# Patient Record
Sex: MALE | Race: WHITE | NOT HISPANIC OR LATINO | Employment: OTHER | URBAN - METROPOLITAN AREA
[De-identification: names, ages, dates, MRNs, and addresses within clinical notes are randomized per-mention and may not be internally consistent; named-entity substitution may affect disease eponyms.]

---

## 2017-01-16 ENCOUNTER — ANTICOAGULATION THERAPY VISIT (OUTPATIENT)
Dept: NURSING | Facility: CLINIC | Age: 77
End: 2017-01-16
Payer: COMMERCIAL

## 2017-01-16 DIAGNOSIS — I48.20 CHRONIC ATRIAL FIBRILLATION (H): ICD-10-CM

## 2017-01-16 DIAGNOSIS — Z79.01 LONG-TERM (CURRENT) USE OF ANTICOAGULANTS: Primary | ICD-10-CM

## 2017-01-16 LAB — INR POINT OF CARE: 2.3 (ref 0.86–1.14)

## 2017-01-16 PROCEDURE — 36416 COLLJ CAPILLARY BLOOD SPEC: CPT

## 2017-01-16 PROCEDURE — 99207 ZZC NO CHARGE NURSE ONLY: CPT

## 2017-01-16 PROCEDURE — 85610 PROTHROMBIN TIME: CPT | Mod: QW

## 2017-01-16 NOTE — PROGRESS NOTES
ANTICOAGULATION FOLLOW-UP CLINIC VISIT    Patient Name:  Malcolm Barker  Date:  1/16/2017  Contact Type:  Face to Face    SUBJECTIVE:     Patient Findings     Positives No Problem Findings           OBJECTIVE    INR PROTIME   Date Value Ref Range Status   01/16/2017 2.3* 0.86 - 1.14 Final       ASSESSMENT / PLAN  INR assessment THER    Recheck INR In: 4 WEEKS    INR Location Clinic      Anticoagulation Summary as of 1/16/2017     INR goal 2.0-3.0   Selected INR 2.3 (1/16/2017)   Maintenance plan 2.5 mg (5 mg x 0.5) on Wed, Sat; 5 mg (5 mg x 1) all other days   Full instructions 2.5 mg on Wed, Sat; 5 mg all other days   Weekly total 30 mg   No change documented Vianca Kent, RN   Plan last modified Mandi Phoenix RN (6/10/2016)   Next INR check 2/13/2017   Priority INR   Target end date     Indications   Long-term (current) use of anticoagulants [Z79.01] [Z79.01]  Chronic atrial fibrillation (H) [I48.2]         Anticoagulation Episode Summary     INR check location     Preferred lab     Send INR reminders to [facility]    Comments       Anticoagulation Care Providers     Provider Role Specialty Phone number    David Jalloh MD Responsible Internal Medicine [phone]            See the Encounter Report to view Anticoagulation Flowsheet and Dosing Calendar (Go to Encounters tab in chart review, and find the Anticoagulation Therapy Visit)        Vianca Kent RN

## 2017-02-13 ENCOUNTER — ANTICOAGULATION THERAPY VISIT (OUTPATIENT)
Dept: NURSING | Facility: CLINIC | Age: 77
End: 2017-02-13
Payer: COMMERCIAL

## 2017-02-13 DIAGNOSIS — I48.20 CHRONIC ATRIAL FIBRILLATION (H): ICD-10-CM

## 2017-02-13 DIAGNOSIS — Z79.01 LONG-TERM (CURRENT) USE OF ANTICOAGULANTS: ICD-10-CM

## 2017-02-13 LAB — INR POINT OF CARE: 2.3 (ref 0.86–1.14)

## 2017-02-13 PROCEDURE — 99207 ZZC NO CHARGE NURSE ONLY: CPT

## 2017-02-13 PROCEDURE — 36416 COLLJ CAPILLARY BLOOD SPEC: CPT

## 2017-02-13 PROCEDURE — 85610 PROTHROMBIN TIME: CPT | Mod: QW

## 2017-02-13 NOTE — PROGRESS NOTES
ANTICOAGULATION FOLLOW-UP CLINIC VISIT    Patient Name:  Malcolm Barker  Date:  2/13/2017  Contact Type:  Face to Face    SUBJECTIVE:     Patient Findings     Positives No Problem Findings           OBJECTIVE    INR Protime   Date Value Ref Range Status   02/13/2017 2.3 (A) 0.86 - 1.14 Final       ASSESSMENT / PLAN  INR assessment THER    Recheck INR In: 4 WEEKS    INR Location Clinic      Anticoagulation Summary as of 2/13/2017     INR goal 2.0-3.0   Today's INR 2.3   Maintenance plan 2.5 mg (5 mg x 0.5) on Wed, Sat; 5 mg (5 mg x 1) all other days   Full instructions 2.5 mg on Wed, Sat; 5 mg all other days   Weekly total 30 mg   No change documented Vianca Kent, RN   Plan last modified Mandi Phoenix RN (6/10/2016)   Next INR check 3/13/2017   Priority INR   Target end date Indefinite    Indications   Long-term (current) use of anticoagulants [Z79.01] [Z79.01]  Chronic atrial fibrillation (H) [I48.2]         Anticoagulation Episode Summary     INR check location     Preferred lab     Send INR reminders to [facility]    Comments 5mg tabs // APPT CARD      Anticoagulation Care Providers     Provider Role Specialty Phone number    David Jalloh MD Responsible Internal Medicine [phone]            See the Encounter Report to view Anticoagulation Flowsheet and Dosing Calendar (Go to Encounters tab in chart review, and find the Anticoagulation Therapy Visit)        Vianca Kent RN

## 2017-03-21 ENCOUNTER — ANTICOAGULATION THERAPY VISIT (OUTPATIENT)
Dept: NURSING | Facility: CLINIC | Age: 77
End: 2017-03-21
Payer: COMMERCIAL

## 2017-03-21 DIAGNOSIS — Z79.01 LONG-TERM (CURRENT) USE OF ANTICOAGULANTS: ICD-10-CM

## 2017-03-21 DIAGNOSIS — I48.20 CHRONIC ATRIAL FIBRILLATION (H): ICD-10-CM

## 2017-03-21 LAB — INR POINT OF CARE: 2 (ref 0.86–1.14)

## 2017-03-21 PROCEDURE — 36416 COLLJ CAPILLARY BLOOD SPEC: CPT

## 2017-03-21 PROCEDURE — 99207 ZZC NO CHARGE NURSE ONLY: CPT

## 2017-03-21 PROCEDURE — 85610 PROTHROMBIN TIME: CPT | Mod: QW

## 2017-03-21 NOTE — PROGRESS NOTES
ANTICOAGULATION FOLLOW-UP CLINIC VISIT    Patient Name:  Malcolm Barker  Date:  3/21/2017  Contact Type:  Face to Face    SUBJECTIVE:     Patient Findings     Positives Dental/Other procedures    Comments He is having a wisdom tooth pulled tomorrow. Needs a recent INR result to bring to dentist.  He is having it done at [facility], Dia Cisneros DDS. Phone: [phone]    Copy of INR result given to patient to bring to dentist office.           OBJECTIVE    INR Protime   Date Value Ref Range Status   03/21/2017 2.0 (A) 0.86 - 1.14 Final       ASSESSMENT / PLAN  INR assessment THER    Recheck INR In: 4 WEEKS    INR Location Clinic      Anticoagulation Summary as of 3/21/2017     INR goal 2.0-3.0   Today's INR 2.0   Maintenance plan 2.5 mg (5 mg x 0.5) on Wed, Sat; 5 mg (5 mg x 1) all other days   Full instructions 2.5 mg on Wed, Sat; 5 mg all other days   Weekly total 30 mg   No change documented Vianca Kent, RN   Plan last modified Mandi Phoenix RN (6/10/2016)   Next INR check 4/18/2017   Priority INR   Target end date Indefinite    Indications   Long-term (current) use of anticoagulants [Z79.01] [Z79.01]  Chronic atrial fibrillation (H) [I48.2]         Anticoagulation Episode Summary     INR check location     Preferred lab     Send INR reminders to [facility]    Comments 5mg tabs // APPT CARD      Anticoagulation Care Providers     Provider Role Specialty Phone number    David Jalloh MD [facility] Internal Medicine [phone]            See the Encounter Report to view Anticoagulation Flowsheet and Dosing Calendar (Go to Encounters tab in chart review, and find the Anticoagulation Therapy Visit)        Vianca Kent, APPLE

## 2017-03-21 NOTE — MR AVS SNAPSHOT
Malcolm HENDERSON Juan Manuelcarlostin   3/21/2017 11:30 AM   Anticoagulation Therapy Visit    Description:  77 year old male   Provider:  [facility]   Department:  [facility]           INR as of 3/21/2017     Today's INR 2.0      Anticoagulation Summary as of 3/21/2017     INR goal 2.0-3.0   Today's INR 2.0   Full instructions 2.5 mg on Wed, Sat; 5 mg all other days   Next INR check 4/18/2017    Indications   Long-term (current) use of anticoagulants [Z79.01] [Z79.01]  Chronic atrial fibrillation (H) [I48.2]         Your next Anticoagulation Clinic appointment(s)     Apr 18, 2017  8:15 AM CDT   Anticoagulation Visit with  ANTICOAGULATION CLINIC   [facility]    [address]  [address]  [place] [address] [phone]              Contact Numbers     [facility]  Please call  [phone] to cancel and/or reschedule your appointment   Please call  [phone] with any problems or questions regarding your therapy.        March 2017 Details    Sun Mon Tue Wed Thu Fri Sat        1               2               3               4                 5               6               7               8               9               10               11                 12               13               14               15               16               17               18                 19               20               21      5 mg   See details      22      2.5 mg         23      5 mg         24      5 mg         25      2.5 mg           26      5 mg         27      5 mg         28      5 mg         29      2.5 mg         30      5 mg         31      5 mg           Date Details   03/21 This INR check               How to take your warfarin dose     To take:  2.5 mg Take 0.5 of a 5 mg tablet.    To take:  5 mg Take 1 of the 5 mg tablets.           April 2017 Details    Sun Mon Tue Wed Thu Fri Sat           1      2.5 mg           2      5 mg         3      5 mg          4      5 mg         5      2.5 mg         6      5 mg         7      5 mg         8      2.5 mg           9      5 mg         10      5 mg         11      5 mg         12      2.5 mg         13      5 mg         14      5 mg         15      2.5 mg           16      5 mg         17      5 mg         18            19               20               21               22                 23               24               25               26               27               28               29                 30                      Date Details   No additional details    Date of next INR:  4/18/2017         How to take your warfarin dose     To take:  2.5 mg Take 0.5 of a 5 mg tablet.    To take:  5 mg Take 1 of the 5 mg tablets.

## 2017-04-05 ENCOUNTER — OFFICE VISIT (OUTPATIENT)
Dept: INTERNAL MEDICINE | Facility: CLINIC | Age: 77
End: 2017-04-05
Payer: COMMERCIAL

## 2017-04-05 ENCOUNTER — TELEPHONE (OUTPATIENT)
Dept: INTERNAL MEDICINE | Facility: CLINIC | Age: 77
End: 2017-04-05

## 2017-04-05 VITALS
TEMPERATURE: 98 F | DIASTOLIC BLOOD PRESSURE: 64 MMHG | HEIGHT: 71 IN | HEART RATE: 60 BPM | BODY MASS INDEX: 28 KG/M2 | SYSTOLIC BLOOD PRESSURE: 96 MMHG | OXYGEN SATURATION: 98 % | WEIGHT: 200 LBS

## 2017-04-05 DIAGNOSIS — G60.3 IDIOPATHIC PROGRESSIVE NEUROPATHY: ICD-10-CM

## 2017-04-05 DIAGNOSIS — Z12.5 SCREENING FOR PROSTATE CANCER: ICD-10-CM

## 2017-04-05 DIAGNOSIS — I50.22 CHRONIC SYSTOLIC CONGESTIVE HEART FAILURE (H): ICD-10-CM

## 2017-04-05 DIAGNOSIS — I10 ESSENTIAL HYPERTENSION, BENIGN: ICD-10-CM

## 2017-04-05 DIAGNOSIS — Z00.00 ROUTINE GENERAL MEDICAL EXAMINATION AT A HEALTH CARE FACILITY: Primary | ICD-10-CM

## 2017-04-05 DIAGNOSIS — I25.10 CORONARY ARTERY DISEASE INVOLVING NATIVE HEART WITHOUT ANGINA PECTORIS, UNSPECIFIED VESSEL OR LESION TYPE: ICD-10-CM

## 2017-04-05 DIAGNOSIS — I48.20 CHRONIC ATRIAL FIBRILLATION (H): ICD-10-CM

## 2017-04-05 LAB
ALBUMIN SERPL-MCNC: 4.1 G/DL (ref 3.4–5)
ALP SERPL-CCNC: 81 U/L (ref 40–150)
ALT SERPL W P-5'-P-CCNC: 25 U/L (ref 0–70)
ANION GAP SERPL CALCULATED.3IONS-SCNC: 4 MMOL/L (ref 3–14)
AST SERPL W P-5'-P-CCNC: 20 U/L (ref 0–45)
BILIRUB SERPL-MCNC: 0.5 MG/DL (ref 0.2–1.3)
BUN SERPL-MCNC: 30 MG/DL (ref 7–30)
CALCIUM SERPL-MCNC: 9.3 MG/DL (ref 8.5–10.1)
CHLORIDE SERPL-SCNC: 105 MMOL/L (ref 94–109)
CHOLEST SERPL-MCNC: 140 MG/DL
CO2 SERPL-SCNC: 32 MMOL/L (ref 20–32)
CREAT SERPL-MCNC: 1.29 MG/DL (ref 0.66–1.25)
ERYTHROCYTE [DISTWIDTH] IN BLOOD BY AUTOMATED COUNT: 13.4 % (ref 10–15)
GFR SERPL CREATININE-BSD FRML MDRD: 54 ML/MIN/1.7M2
GLUCOSE SERPL-MCNC: 101 MG/DL (ref 70–99)
HBA1C MFR BLD: 5.6 % (ref 4.3–6)
HCT VFR BLD AUTO: 45.1 % (ref 40–53)
HDLC SERPL-MCNC: 53 MG/DL
HGB BLD-MCNC: 15 G/DL (ref 13.3–17.7)
LDLC SERPL CALC-MCNC: 66 MG/DL
MCH RBC QN AUTO: 32.8 PG (ref 26.5–33)
MCHC RBC AUTO-ENTMCNC: 33.3 G/DL (ref 31.5–36.5)
MCV RBC AUTO: 99 FL (ref 78–100)
NONHDLC SERPL-MCNC: 87 MG/DL
PLATELET # BLD AUTO: 218 10E9/L (ref 150–450)
POTASSIUM SERPL-SCNC: 5 MMOL/L (ref 3.4–5.3)
PROT SERPL-MCNC: 7.4 G/DL (ref 6.8–8.8)
RBC # BLD AUTO: 4.57 10E12/L (ref 4.4–5.9)
SODIUM SERPL-SCNC: 141 MMOL/L (ref 133–144)
TRIGL SERPL-MCNC: 107 MG/DL
VIT B12 SERPL-MCNC: 605 PG/ML (ref 193–986)
WBC # BLD AUTO: 8.7 10E9/L (ref 4–11)

## 2017-04-05 PROCEDURE — 80061 LIPID PANEL: CPT | Performed by: INTERNAL MEDICINE

## 2017-04-05 PROCEDURE — 99397 PER PM REEVAL EST PAT 65+ YR: CPT | Performed by: INTERNAL MEDICINE

## 2017-04-05 PROCEDURE — G0103 PSA SCREENING: HCPCS | Performed by: INTERNAL MEDICINE

## 2017-04-05 PROCEDURE — 85027 COMPLETE CBC AUTOMATED: CPT | Performed by: INTERNAL MEDICINE

## 2017-04-05 PROCEDURE — 36415 COLL VENOUS BLD VENIPUNCTURE: CPT | Performed by: INTERNAL MEDICINE

## 2017-04-05 PROCEDURE — 83036 HEMOGLOBIN GLYCOSYLATED A1C: CPT | Performed by: INTERNAL MEDICINE

## 2017-04-05 PROCEDURE — 80053 COMPREHEN METABOLIC PANEL: CPT | Performed by: INTERNAL MEDICINE

## 2017-04-05 PROCEDURE — 82607 VITAMIN B-12: CPT | Performed by: INTERNAL MEDICINE

## 2017-04-05 RX ORDER — METOPROLOL SUCCINATE 25 MG/1
25 TABLET, EXTENDED RELEASE ORAL DAILY
COMMUNITY
End: 2023-01-01

## 2017-04-05 RX ORDER — TIMOLOL MALEATE 5 MG/ML
1 SOLUTION/ DROPS OPHTHALMIC DAILY
COMMUNITY
End: 2023-01-01

## 2017-04-05 RX ORDER — AMPICILLIN TRIHYDRATE 250 MG
CAPSULE ORAL DAILY
COMMUNITY
End: 2023-01-01

## 2017-04-05 NOTE — TELEPHONE ENCOUNTER
Per Yeimi-faemily order for [place] guard      Had pt and Dr Jalloh sign form. Filled out form and faxed. Kit will be mailed out to pt

## 2017-04-05 NOTE — PROGRESS NOTES
SUBJECTIVE:                                                            Malcolm Barker is a 77 year old male who presents for Preventive Visit.      Are you in the first 12 months of your Medicare Part B coverage?  No    Healthy Habits:    Do you get at least three servings of calcium containing foods daily (dairy, green leafy vegetables, etc.)? 2-3    Amount of exercise or daily activities, outside of work: 5 day(s) per week    Problems taking medications regularly No    Medication side effects: No    Have you had an eye exam in the past two years? yes    Do you see a dentist twice per year? yes    Do you have sleep apnea, excessive snoring or daytime drowsiness?no    COGNITIVE SCREEN  1) Repeat 3 items (Banana, Sunrise, Chair)    2) Clock draw: NORMAL  3) 3 item recall: Recalls 3 objects  Results: 3 items recalled: COGNITIVE IMPAIRMENT LESS LIKELY    Mini-CogTM Copyright S Pepper. Licensed by the author for use in [facility]; reprinted with permission ([email or web address]). All rights reserved.            PROBLEMS TO ADD ON...  No acute complaints, no medication change or new medical conditions.  Has h/o ischemic heart disease, asymptomatic regarding chest pains, SOB,palpitations. Has good compliance with treatment, diet and exercise.  Has CHF. No edema, has SOB on exertion. Follows with cardiology. Physically active.   Has h/o HTN. on medical treatment. BP has been controlled. No side effects from medications. No CP, HA, dizziness. good compliance with medications and low salt diet.  Has H/O hyperlipidemia. On medical treatment and diet. No side effects. No muscle weakness, myalgias or upset stomach.   Has history of atrial fibrillation. On anticoagulation with Coumadin and rate control medications. Asymptonatic - no chest pains , palpitations,  no side effects from medications.      Reviewed and updated as needed this visit by clinical staff         Reviewed and updated as needed this visit by  Provider        Social History   Substance Use Topics     Smoking status: Former Smoker     Years: 3.00     Smokeless tobacco: Never Used      Comment: quit approx 1999     Alcohol use Yes      Comment: 2 drinks daily       The patient does not drink >3 drinks per day nor >7 drinks per week.    Today's PHQ-2 Score: 0  PHQ-2 ( 1999 Pfizer) 4/5/2017 3/29/2016   Q1: Little interest or pleasure in doing things 0 0   Q2: Feeling down, depressed or hopeless 0 0   PHQ-2 Score 0 0       Do you feel safe in your environment - Yes    Do you have a Health Care Directive?: Yes: Advance Directive has been received and scanned.    Current providers sharing in care for this patient include:   Patient Care Team:  David Jalloh MD as PCP - General (Internal Medicine)      Hearing impairment: No    Ability to successfully perform activities of daily living: Yes, no assistance needed     Fall risk:       Home safety:  none identified      The following health maintenance items are reviewed in Epic and correct as of today:  Health Maintenance   Topic Date Due     ADVANCE DIRECTIVE PLANNING Q5 YRS (NO INBASKET)  03/14/2016     BMP Q6 MOS (NO INBASKET)  09/29/2016     HF ACTION PLAN Q3 YR (NO INBASKET MSG)  12/06/2016     ALT Q1 YR (NO INBASKET)  03/29/2017     LIPID MONITORING Q1 YEAR( NO INBASKET)  03/29/2017     CBC Q1 YR (NO INBASKET)  03/29/2017     FALL RISK ASSESSMENT  03/29/2017     OP ANNUAL INR REFERRAL  04/21/2017     INFLUENZA VACCINE (SYSTEM ASSIGNED)  09/01/2017     COLON CANCER SCREEN (SYSTEM ASSIGNED)  04/24/2023     TETANUS IMMUNIZATION (SYSTEM ASSIGNED)  03/18/2024     PNEUMOCOCCAL  Completed              ROS:  C: NEGATIVE for fever, chills, change in weight  I: NEGATIVE for worrisome rashes, moles or lesions  E: NEGATIVE for vision changes or irritation  E/M: NEGATIVE for ear, mouth and throat problems  R: NEGATIVE for significant cough or SOB  B: NEGATIVE for masses, tenderness or discharge  CV: NEGATIVE for  "chest pain, palpitations or peripheral edema  GI: NEGATIVE for nausea, abdominal pain, heartburn, or change in bowel habits  : NEGATIVE for frequency, dysuria, or hematuria  M: NEGATIVE for significant arthralgias or myalgia  N: NEGATIVE for weakness, dizziness or paresthesias  E: NEGATIVE for temperature intolerance, skin/hair changes  H: NEGATIVE for bleeding problems  P: NEGATIVE for changes in mood or affect    Problem list, Medication list, Allergies, and Medical/Social/Surgical histories reviewed in [facility] and updated as appropriate.  OBJECTIVE:                                                            There were no vitals taken for this visit. Estimated body mass index is 27.89 kg/(m^2) as calculated from the following:    Height as of 3/29/16: 5' 11\" (1.803 m).    Weight as of 3/29/16: 200 lb (90.7 kg).  EXAM:   GENERAL: healthy, alert and no distress  EYES: Eyes grossly normal to inspection, PERRL and conjunctivae and sclerae normal  HENT: ear canals and TM's normal, nose and mouth without ulcers or lesions  NECK: no adenopathy, no asymmetry, masses, or scars and thyroid normal to palpation  RESP: lungs clear to auscultation - no rales, rhonchi or wheezes  CV: regular rate and rhythm, normal S1 S2, no S3 or S4, no murmur, click or rub, no peripheral edema and peripheral pulses strong  ABDOMEN: soft, nontender, no hepatosplenomegaly, no masses and bowel sounds normal  MS: no gross musculoskeletal defects noted, no edema  SKIN: no suspicious lesions or rashes  NEURO: Normal strength and tone, mentation intact and speech normal  PSYCH: mentation appears normal, affect normal/bright    ASSESSMENT / PLAN:                                                                ICD-10-CM    1. Routine general medical examination at a health care facility Z00.00 Prostate spec antigen screen     Lipid panel reflex to direct LDL     CBC with platelets     Comprehensive metabolic panel     *UA reflex to Microscopic and " "Culture ([facility] (except [place] and [place])     Vitamin B12     Hemoglobin A1c   2. Screening for prostate cancer Z12.5    3. Essential hypertension, benign I10 CBC with platelets     Comprehensive metabolic panel     *UA reflex to Microscopic and Culture ([facility] (except [place] and [place])   4. Chronic systolic congestive heart failure (H) I50.22    5. Coronary artery disease involving native heart without angina pectoris, unspecified vessel or lesion type I25.10 Lipid panel reflex to direct LDL   6. Chronic atrial fibrillation (H) I48.2    7. 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  Practitioner is supportive of decision.    COUNSELING:  Reviewed preventive health counseling, as reflected in patient instructions       Regular exercise       Healthy diet/nutrition       Vision screening       Hearing screening       Dental care       Colon cancer screening       Prostate cancer screening        Estimated body mass index is 27.89 kg/(m^2) as calculated from the following:    Height as of 3/29/16: 5' 11\" (1.803 m).    Weight as of 3/29/16: 200 lb (90.7 kg).     reports that he has quit smoking. He quit after 3.00 years of use. He has never used smokeless tobacco.      Appropriate preventive services were discussed with this patient, including applicable screening as appropriate for cardiovascular disease, diabetes, osteopenia/osteoporosis, and glaucoma.  As appropriate for age/gender, discussed screening for colorectal cancer, prostate cancer, breast cancer, and cervical cancer. Checklist reviewing preventive services available has been given to the patient.    Reviewed patients plan of care and provided an AVS. The Intermediate Care Plan ( asthma action plan, low back pain action plan, and migraine action plan) for Malcolm meets the Care Plan requirement. This " Care Plan has been established and reviewed with the Patient.    Counseling Resources:  ATP IV Guidelines  Pooled Cohorts Equation Calculator  Breast Cancer Risk Calculator  FRAX Risk Assessment  ICSI Preventive Guidelines  Dietary Guidelines for Americans, 2010  USDA's MyPlate  ASA Prophylaxis  Lung CA Screening    David Jalloh MD  [facility]

## 2017-04-05 NOTE — MR AVS SNAPSHOT
After Visit Summary   4/5/2017    Malcolm HENDERSON [facility]    MRN: [id number]           Patient Information     Date Of Birth          1940        Visit Information        Provider Department      4/5/2017 8:00 AM David Jalloh MD [facility]        Today's Diagnoses     Routine general medical examination at a health care facility    -  1    Screening for prostate cancer        Essential hypertension, benign        Chronic systolic congestive heart failure (H)        Coronary artery disease involving native heart without angina pectoris, unspecified vessel or lesion type        Chronic atrial fibrillation (H)        Idiopathic progressive neuropathy          Care Instructions      Preventive Health Recommendations:       Male Ages 65 and over    Yearly exam:             See your health care provider every year in order to  o   Review health changes.   o   Discuss preventive care.    o   Review your medicines if your doctor has prescribed any.    Talk with your health care provider about whether you should have a test to screen for prostate cancer (PSA).    Every 3 years, have a diabetes test (fasting glucose). If you are at risk for diabetes, you should have this test more often.    Every 5 years, have a cholesterol test. Have this test more often if you are at risk for high cholesterol or heart disease.     Every 10 years, have a colonoscopy. Or, have a yearly FIT test (stool test). These exams will check for colon cancer.    Talk to with your health care provider about screening for Abdominal Aortic Aneurysm if you have a family history of AAA or have a history of smoking.  Shots:     Get a flu shot each year.     Get a tetanus shot every 10 years.     Talk to your doctor about your pneumonia vaccines. There are now two you should receive - Pneumovax (PPSV 23) and Prevnar (PCV 13).    Talk to your doctor about a shingles vaccine.     Talk to your doctor about the hepatitis B  vaccine.  Nutrition:     Eat at least 5 servings of fruits and vegetables each day.     Eat whole-grain bread, whole-wheat pasta and brown rice instead of white grains and rice.     Talk to your doctor about Calcium and Vitamin D.   Lifestyle    Exercise for at least 150 minutes a week (30 minutes a day, 5 days a week). This will help you control your weight and prevent disease.     Limit alcohol to one drink per day.     No smoking.     Wear sunscreen to prevent skin cancer.     See your dentist every six months for an exam and cleaning.     See your eye doctor every 1 to 2 years to screen for conditions such as glaucoma, macular degeneration and cataracts.        Follow-ups after your visit        Your next 10 appointments already scheduled     Apr 18, 2017  8:15 AM CDT   Anticoagulation Visit with  ANTICOAGULATION CLINIC   [facility]    [address] [place] [address] [phone]              Who to contact     If you have questions or need follow up information about today's clinic visit or your schedule please contact [facility] directly at [phone].  Normal or non-critical lab and imaging results will be communicated to you by [employer], letter or phone within 4 business days after the clinic has received the results. If you do not hear from us within 7 days, please contact the clinic through [employer] or phone. If you have a critical or abnormal lab result, we will notify you by phone as soon as possible.  Submit refill requests through [employer] or call your pharmacy and they will forward the refill request to us. Please allow 3 business days for your refill to be completed.          Additional Information About Your Visit        [employer] Information     [employer] gives you secure access to your electronic health record. If you see a primary care provider, you can also send messages to your care team and make appointments.  "If you have questions, please call your primary care clinic.  If you do not have a primary care provider, please call [phone] and they will assist you.        Care EveryWhere ID     This is your Care EveryWhere ID. This could be used by other organizations to access your [place] medical records  [id number]        Your Vitals Were     Pulse Temperature Height Pulse Oximetry BMI (Body Mass Index)       60 98  F (36.7  C) (Oral) 5' 11\" (1.803 m) 98% 27.89 kg/m2        Blood Pressure from Last 3 Encounters:   04/05/17 96/64   03/29/16 128/70   04/06/15 122/78    Weight from Last 3 Encounters:   04/05/17 200 lb (90.7 kg)   03/29/16 200 lb (90.7 kg)   04/06/15 192 lb (87.1 kg)              We Performed the Following     *UA reflex to Microscopic and Culture ([facility] (except [place] and [place])     CBC with platelets     Comprehensive metabolic panel     Hemoglobin A1c     Lipid panel reflex to direct LDL     Prostate spec antigen screen     Vitamin B12        Primary Care Provider Office Phone # Fax #    David Jalloh [phone]       [facility] [address] [place] [address]        Thank you!     Thank you for choosing [facility]  for your care. Our goal is always to provide you with excellent care. Hearing back from our patients is one way we can continue to improve our services. Please take a few minutes to complete the written survey that you may receive in the mail after your visit with us. Thank you!             Your Updated Medication List - Protect others around you: Learn how to safely use, store and throw away your medicines at www.disposemymeds.org.          This list is accurate as of: 4/5/17  8:46 AM.  Always use your most recent med list.                   Brand Name Dispense Instructions for use    ASPIRIN EC PO      Take 81 mg by mouth daily       bumetanide 1 MG tablet    BUMEX     Take 1 mg by mouth daily 2 in " PM       CoQ10 200 MG Caps      Take by mouth daily       COREG 3.125 MG tablet   Generic drug:  carvedilol      Take 3.125 mg by mouth 2 times daily (with meals)       fluticasone 50 MCG/ACT spray    FLONASE    1 Package    Spray 1-2 sprays into both nostrils daily       gabapentin 100 MG capsule    NEURONTIN    270 capsule    Take 1 capsule (100 mg) by mouth 3 times daily       GLUCOSAMINE CHONDR 1500 COMPLX PO      Take by mouth daily       LIPITOR 40 MG tablet   Generic drug:  atorvastatin      Take 40 mg by mouth daily       lisinopril 2.5 MG tablet    PRINIVIL/Zestril     Take 5 mg by mouth daily.       Magnesium Oxide 250 MG Tabs      Take by mouth daily       metoprolol 25 MG 24 hr tablet    TOPROL-XL     Take 25 mg by mouth daily 2 tablets daily       MULTIVITAMIN PO      Take  by mouth.       omeprazole 40 MG capsule    priLOSEC     Take 1 capsule by mouth daily       PLAVIX 75 MG tablet   Generic drug:  clopidogrel      Take 1 tablet by mouth daily       potassium chloride 20 MEQ Packet    KLOR-CON     Take 20 mEq by mouth 2 times daily       sildenafil 50 MG cap/tab    VIAGRA    30 tablet    Take 1 tablet by mouth See Admin Instructions.       timolol 0.5 % ophthalmic solution    TIMOPTIC     Place 1 drop into the right eye daily       vitamin D 1000 UNITS capsule      Take 1 capsule by mouth daily       warfarin 1 MG tablet    COUMADIN    72 tablet    2.5 mg on Wed, Sat; 5 mg all other days       XALATAN OP      Apply 1 drop to eye daily

## 2017-04-05 NOTE — NURSING NOTE
"Chief Complaint   Patient presents with     Wellness Visit     Fasting Px       Initial BP 96/64  Pulse 60  Temp 98  F (36.7  C) (Oral)  Ht 5' 11\" (1.803 m)  Wt 200 lb (90.7 kg)  SpO2 98%  BMI 27.89 kg/m2 Estimated body mass index is 27.89 kg/(m^2) as calculated from the following:    Height as of this encounter: 5' 11\" (1.803 m).    Weight as of this encounter: 200 lb (90.7 kg).  Medication Reconciliation: complete   Arlen Bishop MA      "

## 2017-04-05 NOTE — LETTER
[facility]  [address], [address]  [place]  [address]      April 7, 2017        [facility]                                                                                                              [facility] [place] [address]          Dear Malcolm,    Slightly elevated PSA. Suggest to recheck in 3 months.   Rest of the results are acceptable. Borderline elevated creatinine, will also recheck with the PSA.    Enclosed is a copy of the results.  It was a pleasure to see you at your last appointment.    If you have any questions or concerns, please contact our office at [phone].    Sincerely,      David Jalloh M.D.

## 2017-04-06 LAB — PSA SERPL-ACNC: 4.72 UG/L (ref 0–4)

## 2017-04-12 ENCOUNTER — TELEPHONE (OUTPATIENT)
Dept: INTERNAL MEDICINE | Facility: CLINIC | Age: 77
End: 2017-04-12

## 2017-04-12 NOTE — TELEPHONE ENCOUNTER
Fax received from [employer] requesting a Advanced Determination for Cologuard.  Put in Dr. Jalloh's in basket to review,

## 2017-04-12 NOTE — TELEPHONE ENCOUNTER
Received a call from the [employer] at [phone].  They received a order for a another cologuard test on this pt on 4/5/17.  Pt last test was 11-, which covers the pt for 3 years.    Company want to know to cancel test or to go ahead with testing.  I stated I would speak to the MD and get back to them.    Please advise  Raymundo SORIANO RN

## 2017-04-13 DIAGNOSIS — G60.9 IDIOPATHIC PERIPHERAL NEUROPATHY: Primary | ICD-10-CM

## 2017-04-13 NOTE — TELEPHONE ENCOUNTER
Gabapentin      Last Written Prescription Date:  8/9/16  Last Fill Quantity: 270,   # refills: 1  Last Office Visit with [facility] prescribing provider: 4/5/17  Future Office visit: 4/5/17      Routing refill request to provider for review/approval because:  Drug not on the [facility] refill protocol or controlled substance

## 2017-04-14 ENCOUNTER — MYC MEDICAL ADVICE (OUTPATIENT)
Dept: INTERNAL MEDICINE | Facility: CLINIC | Age: 77
End: 2017-04-14

## 2017-04-14 DIAGNOSIS — I48.20 CHRONIC ATRIAL FIBRILLATION (H): ICD-10-CM

## 2017-04-14 RX ORDER — GABAPENTIN 100 MG/1
100 CAPSULE ORAL 3 TIMES DAILY
Qty: 270 CAPSULE | Refills: 1 | Status: SHIPPED | OUTPATIENT
Start: 2017-04-14 | End: 2017-05-30

## 2017-04-14 RX ORDER — WARFARIN SODIUM 5 MG/1
TABLET ORAL
Qty: 72 TABLET | Refills: 1 | Status: SHIPPED | OUTPATIENT
Start: 2017-04-14 | End: 2017-09-29

## 2017-04-14 NOTE — TELEPHONE ENCOUNTER
Warfarin 5 mg      Last Office Visit with [facility], [facility] or [facility] prescribing provider: 4/5/17       Date and Result of Last PT/INR:   Lab Results   Component Value Date    INR 2.0 03/21/2017    INR 2.3 02/13/2017    INR 2.5 09/02/2016    INR 2.2 07/25/2016      Prescription approved per [facility] Refill Protocol.  Mandi Phoenix RN

## 2017-04-18 ENCOUNTER — ANTICOAGULATION THERAPY VISIT (OUTPATIENT)
Dept: NURSING | Facility: CLINIC | Age: 77
End: 2017-04-18
Payer: COMMERCIAL

## 2017-04-18 ENCOUNTER — TELEPHONE (OUTPATIENT)
Dept: INTERNAL MEDICINE | Facility: CLINIC | Age: 77
End: 2017-04-18

## 2017-04-18 DIAGNOSIS — Z79.01 LONG-TERM (CURRENT) USE OF ANTICOAGULANTS: ICD-10-CM

## 2017-04-18 DIAGNOSIS — I48.20 CHRONIC ATRIAL FIBRILLATION (H): ICD-10-CM

## 2017-04-18 DIAGNOSIS — I48.20 CHRONIC ATRIAL FIBRILLATION (H): Primary | ICD-10-CM

## 2017-04-18 LAB — INR POINT OF CARE: 2.1 (ref 0.86–1.14)

## 2017-04-18 PROCEDURE — 36416 COLLJ CAPILLARY BLOOD SPEC: CPT

## 2017-04-18 PROCEDURE — 99207 ZZC NO CHARGE NURSE ONLY: CPT

## 2017-04-18 PROCEDURE — 85610 PROTHROMBIN TIME: CPT | Mod: QW

## 2017-04-18 NOTE — MR AVS SNAPSHOT
Malcolm HENDERSON [facility]   4/18/2017 2:30 PM   Anticoagulation Therapy Visit    Description:  77 year old male   Provider:  [facility]   Department:  [facility]           INR as of 4/18/2017     Today's INR 2.1      Anticoagulation Summary as of 4/18/2017     INR goal 2.0-3.0   Today's INR 2.1   Full instructions 2.5 mg on Wed, Sat; 5 mg all other days   Next INR check 5/25/2017    Indications   Long-term (current) use of anticoagulants [Z79.01] [Z79.01]  Chronic atrial fibrillation (H) [I48.2]         Your next Anticoagulation Clinic appointment(s)     May 25, 2017 11:30 AM CDT   Anticoagulation Visit with  ANTICOAGULATION CLINIC   [facility]    [address]  [address]  [place] [address] [phone]              Contact Numbers     [facility]  Please call  [phone] to cancel and/or reschedule your appointment   Please call  [phone] with any problems or questions regarding your therapy.        April 2017 Details    Sun Mon Tue Wed Thu Fri Sat           1                 2               3               4               5               6               7               8                 9               10               11               12               13               14               15                 16               17               18      5 mg   See details      19      2.5 mg         20      5 mg         21      5 mg         22      2.5 mg           23      5 mg         24      5 mg         25      5 mg         26      2.5 mg         27      5 mg         28      5 mg         29      2.5 mg           30      5 mg                Date Details   04/18 This INR check               How to take your warfarin dose     To take:  2.5 mg Take 0.5 of a 5 mg tablet.    To take:  5 mg Take 1 of the 5 mg tablets.           May 2017 Details    Sun Mon Tue Wed Thu Fri Sat      1      5 mg         2      5 mg         3      2.5 mg         4       5 mg         5      5 mg         6      2.5 mg           7      5 mg         8      5 mg         9      5 mg         10      2.5 mg         11      5 mg         12      5 mg         13      2.5 mg           14      5 mg         15      5 mg         16      5 mg         17      2.5 mg         18      5 mg         19      5 mg         20      2.5 mg           21      5 mg         22      5 mg         23      5 mg         24      2.5 mg         25            26               27                 28               29               30               31                   Date Details   No additional details    Date of next INR:  5/25/2017         How to take your warfarin dose     To take:  2.5 mg Take 0.5 of a 5 mg tablet.    To take:  5 mg Take 1 of the 5 mg tablets.

## 2017-04-18 NOTE — PROGRESS NOTES
ANTICOAGULATION FOLLOW-UP CLINIC VISIT    [facility]  [facility]  Date:  4/18/2017  Contact Type:  Face to Face    SUBJECTIVE:     Patient Findings     Positives No Problem Findings    Comments He will be spending the summer at his cabin in [place].  He gets his INRs done at a clinic there and they fax the results to the INR Clinic here.    Telephone encounter sent to his PCP (Yeimi) to sign letter orders for patient to bring with him.           OBJECTIVE    INR Protime   Date Value Ref Range Status   04/18/2017 2.1 (A) 0.86 - 1.14 Final       ASSESSMENT / PLAN  INR assessment THER    Recheck INR In: 5 WEEKS    INR Location Clinic      Anticoagulation Summary as of 4/18/2017     INR goal 2.0-3.0   Today's INR 2.1   Maintenance plan 2.5 mg (5 mg x 0.5) on Wed, Sat; 5 mg (5 mg x 1) all other days   Full instructions 2.5 mg on Wed, Sat; 5 mg all other days   Weekly total 30 mg   No change documented Vianca Kent RN   Plan last modified Mandi Phoenix RN (6/10/2016)   Next INR check 5/25/2017   Priority INR   Target end date Indefinite    Indications   Long-term (current) use of anticoagulants [Z79.01] [Z79.01]  Chronic atrial fibrillation (H) [I48.2]         Anticoagulation Episode Summary     INR check location     Preferred lab     Send INR reminders to [facility]    Comments 5mg tabs // APPT CARD      Anticoagulation Care Providers     Provider Role Specialty Phone number    David Jalloh MD [facility] Internal Medicine [phone]            See the Encounter Report to view Anticoagulation Flowsheet and Dosing Calendar (Go to Encounters tab in chart review, and find the Anticoagulation Therapy Visit)        Vianca Kent, APPLE

## 2017-04-19 NOTE — TELEPHONE ENCOUNTER
LM for Pt to call back. Letter sent to scan and original placed at  for Pt to .  Arlen Bishop MA

## 2017-04-19 NOTE — TELEPHONE ENCOUNTER
Patient will be leaving for his cabin in [place] for the summer and is asking for an order to bring with him to have INR's drawn while there.      Order letter started.  Please add any additional information, print, sign, and call him when ready to .    Vianca Kent RN

## 2017-05-22 ENCOUNTER — MYC MEDICAL ADVICE (OUTPATIENT)
Dept: INTERNAL MEDICINE | Facility: CLINIC | Age: 77
End: 2017-05-22

## 2017-05-22 NOTE — LETTER
[place] [address]  [facility] [address]  [phone]             May 23, 2017    Regarding-Malcolm Barker [birth year]  [address]  [place] [address]            LAB ORDER-      Pt needs a PSA drawn in June or July. Please fax results to [phone]. Any questions please call [phone].            Thank you            David Jalloh MD

## 2017-05-23 NOTE — TELEPHONE ENCOUNTER
Per note attached to 4/5/17 lab results-Notes Recorded by David Jalloh MD on 4/7/2017 at 1:18 PM  Slightly elevated PSA. Suggest to recheck in 3 months.   Rest of the results are acceptable. Borderline elevated creatinine, will also recheck with the PSA.

## 2017-05-30 ENCOUNTER — ANTICOAGULATION THERAPY VISIT (OUTPATIENT)
Dept: NURSING | Facility: CLINIC | Age: 77
End: 2017-05-30
Payer: COMMERCIAL

## 2017-05-30 DIAGNOSIS — I48.20 CHRONIC ATRIAL FIBRILLATION (H): ICD-10-CM

## 2017-05-30 DIAGNOSIS — G60.9 IDIOPATHIC PERIPHERAL NEUROPATHY: ICD-10-CM

## 2017-05-30 DIAGNOSIS — Z79.01 LONG-TERM (CURRENT) USE OF ANTICOAGULANTS: ICD-10-CM

## 2017-05-30 LAB — INR POINT OF CARE: 2.1 (ref 0.86–1.14)

## 2017-05-30 PROCEDURE — 36416 COLLJ CAPILLARY BLOOD SPEC: CPT

## 2017-05-30 PROCEDURE — 85610 PROTHROMBIN TIME: CPT | Mod: QW

## 2017-05-30 PROCEDURE — 99207 ZZC NO CHARGE NURSE ONLY: CPT

## 2017-05-30 RX ORDER — GABAPENTIN 100 MG/1
100 CAPSULE ORAL 3 TIMES DAILY
Qty: 270 CAPSULE | Refills: 1 | Status: SHIPPED | OUTPATIENT
Start: 2017-05-30 | End: 2017-10-30

## 2017-05-30 NOTE — MR AVS SNAPSHOT
[facility]   5/30/2017 8:30 AM   Anticoagulation Therapy Visit    Description:  77 year old male   Provider:  [facility]   Department:  [facility]           INR as of 5/30/2017     Today's INR 2.1      Anticoagulation Summary as of 5/30/2017     INR goal 2.0-3.0   Today's INR 2.1   Full instructions 2.5 mg on Wed, Sat; 5 mg all other days   Next INR check 7/11/2017    Indications   Long-term (current) use of anticoagulants [Z79.01] [Z79.01]  Chronic atrial fibrillation (H) [I48.2]         Contact Numbers     [facility]  Please call  [phone] to cancel and/or reschedule your appointment   Please call  [phone] with any problems or questions regarding your therapy.        May 2017 Details    Sun Mon Tue Wed Thu Fri Sat      1               2               3               4               5               6                 7               8               9               10               11               12               13                 14               15               16               17               18               19               20                 21               22               23               24               25               26               27                 28               29               30      5 mg   See details      31      2.5 mg             Date Details   05/30 This INR check               How to take your warfarin dose     To take:  2.5 mg Take 0.5 of a 5 mg tablet.    To take:  5 mg Take 1 of the 5 mg tablets.           June 2017 Details    Sun Mon Tue Wed Thu Fri Sat         1      5 mg         2      5 mg         3      2.5 mg           4      5 mg         5      5 mg         6      5 mg         7      2.5 mg         8      5 mg         9      5 mg         10      2.5 mg           11      5 mg         12      5 mg         13      5 mg         14      2.5 mg         15      5 mg         16      5 mg         17      2.5 mg           18      5 mg          19      5 mg         20      5 mg         21      2.5 mg         22      5 mg         23      5 mg         24      2.5 mg           25      5 mg         26      5 mg         27      5 mg         28      2.5 mg         29      5 mg         30      5 mg           Date Details   No additional details            How to take your warfarin dose     To take:  2.5 mg Take 0.5 of a 5 mg tablet.    To take:  5 mg Take 1 of the 5 mg tablets.           July 2017 Details    Sun Mon Tue Wed Thu Fri Sat           1      2.5 mg           2      5 mg         3      5 mg         4      5 mg         5      2.5 mg         6      5 mg         7      5 mg         8      2.5 mg           9      5 mg         10      5 mg         11            12               13               14               15                 16               17               18               19               20               21               22                 23               24               25               26               27               28               29                 30               31                     Date Details   No additional details    Date of next INR:  7/11/2017         How to take your warfarin dose     To take:  2.5 mg Take 0.5 of a 5 mg tablet.    To take:  5 mg Take 1 of the 5 mg tablets.

## 2017-05-30 NOTE — PROGRESS NOTES
ANTICOAGULATION FOLLOW-UP CLINIC VISIT    Patient Name:  Malcolm Barker  Date:  5/30/2017  Contact Type:  Face to Face    SUBJECTIVE:     Patient Findings     Positives No Problem Findings    Comments He is going to his cabin in [place] until October.  He has his INRs checked at the [facility] lab there and they will fax results to [facility].           OBJECTIVE    INR Protime   Date Value Ref Range Status   05/30/2017 2.1 (A) 0.86 - 1.14 Final       ASSESSMENT / PLAN  INR assessment THER    Recheck INR In: 4 WEEKS    INR Location Clinic      Anticoagulation Summary as of 5/30/2017     INR goal 2.0-3.0   Today's INR 2.1   Maintenance plan 2.5 mg (5 mg x 0.5) on Wed, Sat; 5 mg (5 mg x 1) all other days   Full instructions 2.5 mg on Wed, Sat; 5 mg all other days   Weekly total 30 mg   No change documented Vianca Kent RN   Plan last modified Mandi Phoenix RN (6/10/2016)   Next INR check 7/11/2017   Priority INR   Target end date Indefinite    Indications   Long-term (current) use of anticoagulants [Z79.01] [Z79.01]  Chronic atrial fibrillation (H) [I48.2]         Anticoagulation Episode Summary     INR check location     Preferred lab     Send IN[facility] reminders to [facility]    Comments 5mg tabs // APPT CARD      Anticoagulation Care Providers     Provider Role Specialty Phone number    David Jalloh MD Responsible Internal Medicine [phone]            See the Encounter Report to view Anticoagulation Flowsheet and Dosing Calendar (Go to Encounters tab in chart review, and find the Anticoagulation Therapy Visit)        Vianca Kent RN

## 2017-05-30 NOTE — TELEPHONE ENCOUNTER
Gabapentin      Last Written Prescription Date:  4/14/17 (to a different pharmacy)  Last Fill Quantity: 270,   # refills: 1  Last Office Visit with [facility] or  [facility] prescribing provider: 4/5/17  Future Office visit: none      Routing refill request to provider for review/approval because:  Drug not on the [facility] refill protocol or controlled substance

## 2017-07-05 ENCOUNTER — TRANSFERRED RECORDS (OUTPATIENT)
Dept: HEALTH INFORMATION MANAGEMENT | Facility: CLINIC | Age: 77
End: 2017-07-05

## 2017-07-12 ENCOUNTER — TELEPHONE (OUTPATIENT)
Dept: INTERNAL MEDICINE | Facility: CLINIC | Age: 77
End: 2017-07-12

## 2017-07-12 NOTE — TELEPHONE ENCOUNTER
Fax received from [employer] for review and signature.  Put in Dr. Jimenez's in basket in Dr. Jalloh's absence.

## 2017-07-12 NOTE — TELEPHONE ENCOUNTER
Received fax from [facility] in [place], [place] asking for Omeprazole 40 mg once a day.  It does not look like patient has had this filled through us since March 2013.     Call placed to patient, he said to disregard this refill request as he had read that omeprazole should not be used long term.  He has switched to ranitidine OTC which seems to be working so far.  He will discuss with PCP at next office visit. Pharmacy advised to disregard refill request.   JETT Stein R.N.     no radiation

## 2017-07-19 ENCOUNTER — MYC MEDICAL ADVICE (OUTPATIENT)
Dept: INTERNAL MEDICINE | Facility: CLINIC | Age: 77
End: 2017-07-19

## 2017-07-19 DIAGNOSIS — Z79.01 LONG-TERM (CURRENT) USE OF ANTICOAGULANTS: Primary | ICD-10-CM

## 2017-07-19 DIAGNOSIS — K21.9 GASTROESOPHAGEAL REFLUX DISEASE WITHOUT ESOPHAGITIS: ICD-10-CM

## 2017-08-14 ENCOUNTER — ANTICOAGULATION THERAPY VISIT (OUTPATIENT)
Dept: PEDIATRICS | Facility: CLINIC | Age: 77
End: 2017-08-14
Payer: COMMERCIAL

## 2017-08-14 DIAGNOSIS — I48.20 CHRONIC ATRIAL FIBRILLATION (H): ICD-10-CM

## 2017-08-14 DIAGNOSIS — Z79.01 LONG-TERM (CURRENT) USE OF ANTICOAGULANTS: ICD-10-CM

## 2017-08-14 LAB — INR PPP: 2.2

## 2017-08-14 PROCEDURE — 99207 ZZC NO CHARGE NURSE ONLY: CPT | Performed by: INTERNAL MEDICINE

## 2017-08-14 NOTE — PROGRESS NOTES
ANTICOAGULATION FOLLOW-UP CLINIC VISIT    Patient Name:  Malcolm Barker  Date:  8/14/2017  Contact Type:  Telephone/ Pt has been checking his INR through [facility] in [facility]. No change in dose, will recheck in 4 weeks & notify us the INR value.    SUBJECTIVE:     Patient Findings     Positives No Problem Findings    Comments Pt has been checking his INR through [facility] in [facility]. No change in dose, will recheck in 4 weeks & notify us the INR value.           OBJECTIVE    INR   Date Value Ref Range Status   08/14/2017 2.2  Final       ASSESSMENT / PLAN  INR assessment THER    Recheck INR In: 4 WEEKS    INR Location Outside lab      Anticoagulation Summary as of 8/14/2017     INR goal 2.0-3.0   Today's INR 2.2   Maintenance plan 2.5 mg (5 mg x 0.5) on Wed, Sat; 5 mg (5 mg x 1) all other days   Full instructions 2.5 mg on Wed, Sat; 5 mg all other days   Weekly total 30 mg   No change documented Yudy Plunkett RN   Plan last modified Mandi Phoenix RN (6/10/2016)   Next INR check 9/11/2017   Priority INR   Target end date Indefinite    Indications   Long-term (current) use of anticoagulants [Z79.01] [Z79.01]  Chronic atrial fibrillation (H) [I48.2]         Anticoagulation Episode Summary     INR check location     Preferred lab     Send [facility] reminders to [facility]    Comments 5mg tabs // APPT CARD      Anticoagulation Care Providers     Provider Role Specialty Phone number    David Jalloh MD Responsible Internal Medicine [phone]            See the Encounter Report to view Anticoagulation Flowsheet and Dosing Calendar (Go to Encounters tab in chart review, and find the Anticoagulation Therapy Visit)      Yudy Plunkett RN

## 2017-08-14 NOTE — MR AVS SNAPSHOT
Malcolm HENDERSON [facility]   8/14/2017   Anticoagulation Therapy Visit    Description:  77 year old male   Provider:  David Jalloh MD   Department:  [facility]           INR as of 8/14/2017     Today's INR 2.2      Anticoagulation Summary as of 8/14/2017     INR goal 2.0-3.0   Today's INR 2.2   Full instructions 2.5 mg on Wed, Sat; 5 mg all other days   Next INR check 9/11/2017    Indications   Long-term (current) use of anticoagulants [Z79.01] [Z79.01]  Chronic atrial fibrillation (H) [I48.2]         August 2017 Details    Sun Mon Tue Wed Thu Fri Sat       1               2               3               4               5                 6               7               8               9               10               11               12                 13               14      5 mg   See details      15      5 mg         16      2.5 mg         17      5 mg         18      5 mg         19      2.5 mg           20      5 mg         21      5 mg         22      5 mg         23      2.5 mg         24      5 mg         25      5 mg         26      2.5 mg           27      5 mg         28      5 mg         29      5 mg         30      2.5 mg         31      5 mg            Date Details   08/14 This INR check               How to take your warfarin dose     To take:  2.5 mg Take 0.5 of a 5 mg tablet.    To take:  5 mg Take 1 of the 5 mg tablets.           September 2017 Details    Sun Mon Tue Wed Thu Fri Sat          1      5 mg         2      2.5 mg           3      5 mg         4      5 mg         5      5 mg         6      2.5 mg         7      5 mg         8      5 mg         9      2.5 mg           10      5 mg         11            12               13               14               15               16                 17               18               19               20               21               22               23                 24               25               26               27               28                29 30                Date Details   No additional details    Date of next INR:  9/11/2017         How to take your warfarin dose     To take:  2.5 mg Take 0.5 of a 5 mg tablet.    To take:  5 mg Take 1 of the 5 mg tablets.

## 2017-09-01 ENCOUNTER — TRANSFERRED RECORDS (OUTPATIENT)
Dept: HEALTH INFORMATION MANAGEMENT | Facility: CLINIC | Age: 77
End: 2017-09-01

## 2017-09-05 ENCOUNTER — ANTICOAGULATION THERAPY VISIT (OUTPATIENT)
Dept: NURSING | Facility: CLINIC | Age: 77
End: 2017-09-05
Payer: COMMERCIAL

## 2017-09-05 DIAGNOSIS — Z79.01 LONG-TERM (CURRENT) USE OF ANTICOAGULANTS: ICD-10-CM

## 2017-09-05 DIAGNOSIS — I48.20 CHRONIC ATRIAL FIBRILLATION (H): ICD-10-CM

## 2017-09-05 LAB — INR PPP: 2.2

## 2017-09-05 PROCEDURE — 99207 ZZC NO CHARGE NURSE ONLY: CPT

## 2017-09-05 NOTE — MR AVS SNAPSHOT
Malcolm BEATRIZ [facility]   9/5/2017 1:30 PM   Anticoagulation Therapy Visit    Description:  77 year old male   Provider:  [facility]   Department:  [facility]           INR as of 9/5/2017     Today's INR 2.2      Anticoagulation Summary as of 9/5/2017     INR goal 2.0-3.0   Today's INR 2.2   Full instructions 2.5 mg on Wed, Sat; 5 mg all other days   Next INR check 10/3/2017    Indications   Long-term (current) use of anticoagulants [Z79.01] [Z79.01]  Chronic atrial fibrillation (H) [I48.2]         Contact Numbers     [facility]  Please call  [phone] to cancel and/or reschedule your appointment   Please call  [phone] with any problems or questions regarding your therapy.        September 2017 Details    Sun Mon Tue Wed Thu Fri Sat          1               2                 3               4               5      5 mg   See details      6      2.5 mg         7      5 mg         8      5 mg         9      2.5 mg           10      5 mg         11      5 mg         12      5 mg         13      2.5 mg         14      5 mg         15      5 mg         16      2.5 mg           17      5 mg         18      5 mg         19      5 mg         20      2.5 mg         21      5 mg         22      5 mg         23      2.5 mg           24      5 mg         25      5 mg         26      5 mg         27      2.5 mg         28      5 mg         29      5 mg         30      2.5 mg          Date Details   09/05 This INR check               How to take your warfarin dose     To take:  2.5 mg Take 0.5 of a 5 mg tablet.    To take:  5 mg Take 1 of the 5 mg tablets.           October 2017 Details    Sun Mon Tue Wed Thu Fri Sat     1      5 mg         2      5 mg         3            4               5               6               7                 8               9               10               11               12               13               14                 15               16               17                18               19               20               21                 22               23               24               25               26               27               28                 29               30               31                    Date Details   No additional details    Date of next INR:  10/3/2017         How to take your warfarin dose     To take:  5 mg Take 1 of the 5 mg tablets.

## 2017-09-05 NOTE — PROGRESS NOTES
ANTICOAGULATION FOLLOW-UP     [facility]  Date:  9/5/2017  Contact Type:  Telephone  Patient is at his cabin in [place].  He has his INR checked at the [facility] lab and they fax result to [facility]    SUBJECTIVE:     Patient Findings     Comments He was hospitalized for several days in [place] with bacterial pneumonia.           OBJECTIVE    INR   Date Value Ref Range Status   09/05/2017 2.2  Final       ASSESSMENT / PLAN  INR assessment THER    Recheck INR In: 4 WEEKS    INR Location Outside lab      Anticoagulation Summary as of 9/5/2017     INR goal 2.0-3.0   Today's INR 2.2   Maintenance plan 2.5 mg (5 mg x 0.5) on Wed, Sat; 5 mg (5 mg x 1) all other days   Full instructions 2.5 mg on Wed, Sat; 5 mg all other days   Weekly total 30 mg   No change documented Vianca Kent, RN   Plan last modified Mandi Phoenix RN (6/10/2016)   Next INR check 10/3/2017   Priority INR   Target end date Indefinite    Indications   Long-term (current) use of anticoagulants [Z79.01] [Z79.01]  Chronic atrial fibrillation (H) [I48.2]         Anticoagulation Episode Summary     INR check location     Preferred lab     Send [facility]R reminders to [facility]    Comments 5mg tabs // APPT CARD      Anticoagulation Care Providers     Provider Role Specialty Phone number    David Jalloh MD [facility] Internal Medicine [phone]            See the Encounter Report to view Anticoagulation Flowsheet and Dosing Calendar (Go to Encounters tab in chart review, and find the Anticoagulation Therapy Visit)        Vianca Kent RN

## 2017-09-29 DIAGNOSIS — I48.20 CHRONIC ATRIAL FIBRILLATION (H): ICD-10-CM

## 2017-09-29 RX ORDER — WARFARIN SODIUM 5 MG/1
TABLET ORAL
Qty: 72 TABLET | Refills: 1 | Status: SHIPPED | OUTPATIENT
Start: 2017-09-29 | End: 2018-03-27

## 2017-09-29 NOTE — TELEPHONE ENCOUNTER
Warfarin   Prescription approved per [facility] Refill Protocol.    Last Written Prescription Date: 4/14/17  Last Fill Qty: 72, # refills: 1    Last Office Visit with [facility], [facility] or [facility] prescribing provider: 4/5/17       Lab Results   Component Value Date    INR 2.2 09/05/2017    INR 2.2 08/14/2017    INR 2.1 (A) 05/30/2017    INR 2.1 (A) 04/18/2017    INR 2.0 (A) 03/21/2017

## 2017-10-02 ENCOUNTER — ANTICOAGULATION THERAPY VISIT (OUTPATIENT)
Dept: NURSING | Facility: CLINIC | Age: 77
End: 2017-10-02
Payer: COMMERCIAL

## 2017-10-02 DIAGNOSIS — I48.20 CHRONIC ATRIAL FIBRILLATION (H): ICD-10-CM

## 2017-10-02 DIAGNOSIS — Z79.01 LONG-TERM (CURRENT) USE OF ANTICOAGULANTS: ICD-10-CM

## 2017-10-02 LAB — INR POINT OF CARE: 2.7 (ref 0.86–1.14)

## 2017-10-02 PROCEDURE — 85610 PROTHROMBIN TIME: CPT | Mod: QW

## 2017-10-02 PROCEDURE — 99207 ZZC NO CHARGE NURSE ONLY: CPT

## 2017-10-02 PROCEDURE — 36416 COLLJ CAPILLARY BLOOD SPEC: CPT

## 2017-10-02 NOTE — MR AVS SNAPSHOT
[facility]   10/2/2017 3:00 PM   Anticoagulation Therapy Visit    Description:  77 year old male   Provider:  [facility]   Department:   Nurse           INR as of 10/2/2017     Today's INR 2.7      Anticoagulation Summary as of 10/2/2017     INR goal 2.0-3.0   Today's INR 2.7   Full instructions 2.5 mg on Wed, Sat; 5 mg all other days   Next INR check 10/30/2017    Indications   Long-term (current) use of anticoagulants [Z79.01] [Z79.01]  Chronic atrial fibrillation (H) [I48.2]         Your next Anticoagulation Clinic appointment(s)     Oct 30, 2017  1:00 PM CDT   Anticoagulation Visit with  ANTICOAGULATION CLINIC   [facility]    [address]  [address]  [employer] [address] [phone]              Contact Numbers     [facility]  Please call  [phone] to cancel and/or reschedule your appointment   Please call  [phone] with any problems or questions regarding your therapy.        October 2017 Details    Sun Mon Tue Wed Thu Fri Sat     1               2      5 mg   See details      3      5 mg         4      2.5 mg         5      5 mg         6      5 mg         7      2.5 mg           8      5 mg         9      5 mg         10      5 mg         11      2.5 mg         12      5 mg         13      5 mg         14      2.5 mg           15      5 mg         16      5 mg         17      5 mg         18      2.5 mg         19      5 mg         20      5 mg         21      2.5 mg           22      5 mg         23      5 mg         24      5 mg         25      2.5 mg         26      5 mg         27      5 mg         28      2.5 mg           29      5 mg         30            31                    Date Details   10/02 This INR check       Date of next INR:  10/30/2017         How to take your warfarin dose     To take:  2.5 mg Take 0.5 of a 5 mg tablet.    To take:  5 mg Take 1 of the 5 mg tablets.

## 2017-10-02 NOTE — PROGRESS NOTES
ANTICOAGULATION FOLLOW-UP CLINIC VISIT    [facility]  Date:  10/2/2017  Contact Type:  Face to Face    SUBJECTIVE:     Patient Findings     Positives Antibiotic use or infection (all antibiotics completed for pneumonia)           OBJECTIVE    INR Protime   Date Value Ref Range Status   10/02/2017 2.7 (A) 0.86 - 1.14 Final       ASSESSMENT / PLAN  INR assessment THER    Recheck INR In: 4 WEEKS    INR Location Clinic      Anticoagulation Summary as of 10/2/2017     INR goal 2.0-3.0   Today's INR 2.7   Maintenance plan 2.5 mg (5 mg x 0.5) on Wed, Sat; 5 mg (5 mg x 1) all other days   Full instructions 2.5 mg on Wed, Sat; 5 mg all other days   Weekly total 30 mg   No change documented Nay Trejo RN   Plan last modified Mandi Phoenix RN (6/10/2016)   Next INR check 10/30/2017   Priority INR   Target end date Indefinite    Indications   Long-term (current) use of anticoagulants [Z79.01] [Z79.01]  Chronic atrial fibrillation (H) [I48.2]         Anticoagulation Episode Summary     INR check location     Preferred lab     Send INR reminders to  ANTICOAG CLINIC    Comments 5mg tabs // APPT CARD      Anticoagulation Care Providers     Provider Role Specialty Phone number    David Jalloh MD Responsible Internal Medicine [phone]            See the Encounter Report to view Anticoagulation Flowsheet and Dosing Calendar (Go to Encounters tab in chart review, and find the Anticoagulation Therapy Visit)        Nay Trejo, RN

## 2017-10-06 ENCOUNTER — TELEPHONE (OUTPATIENT)
Dept: INTERNAL MEDICINE | Facility: CLINIC | Age: 77
End: 2017-10-06

## 2017-10-30 ENCOUNTER — OFFICE VISIT (OUTPATIENT)
Dept: PEDIATRICS | Facility: CLINIC | Age: 77
End: 2017-10-30
Payer: COMMERCIAL

## 2017-10-30 ENCOUNTER — ANTICOAGULATION THERAPY VISIT (OUTPATIENT)
Dept: NURSING | Facility: CLINIC | Age: 77
End: 2017-10-30
Payer: COMMERCIAL

## 2017-10-30 VITALS
BODY MASS INDEX: 28.7 KG/M2 | WEIGHT: 205 LBS | DIASTOLIC BLOOD PRESSURE: 50 MMHG | TEMPERATURE: 97.5 F | HEIGHT: 71 IN | HEART RATE: 57 BPM | OXYGEN SATURATION: 98 % | SYSTOLIC BLOOD PRESSURE: 80 MMHG

## 2017-10-30 DIAGNOSIS — I48.20 CHRONIC ATRIAL FIBRILLATION (H): ICD-10-CM

## 2017-10-30 DIAGNOSIS — I50.22 CHRONIC SYSTOLIC CONGESTIVE HEART FAILURE (H): ICD-10-CM

## 2017-10-30 DIAGNOSIS — G60.9 IDIOPATHIC PERIPHERAL NEUROPATHY: ICD-10-CM

## 2017-10-30 DIAGNOSIS — G60.3 IDIOPATHIC PROGRESSIVE NEUROPATHY: ICD-10-CM

## 2017-10-30 DIAGNOSIS — I25.10 CORONARY ARTERY DISEASE INVOLVING NATIVE CORONARY ARTERY OF NATIVE HEART WITHOUT ANGINA PECTORIS: Primary | ICD-10-CM

## 2017-10-30 DIAGNOSIS — Z79.01 LONG-TERM (CURRENT) USE OF ANTICOAGULANTS: ICD-10-CM

## 2017-10-30 LAB — INR POINT OF CARE: 2.6 (ref 0.86–1.14)

## 2017-10-30 PROCEDURE — 85610 PROTHROMBIN TIME: CPT | Mod: QW

## 2017-10-30 PROCEDURE — 36416 COLLJ CAPILLARY BLOOD SPEC: CPT

## 2017-10-30 PROCEDURE — 99214 OFFICE O/P EST MOD 30 MIN: CPT | Performed by: INTERNAL MEDICINE

## 2017-10-30 PROCEDURE — 99207 ZZC NO CHARGE NURSE ONLY: CPT

## 2017-10-30 RX ORDER — GABAPENTIN 100 MG/1
100 CAPSULE ORAL 3 TIMES DAILY
Qty: 270 CAPSULE | Refills: 1 | Status: SHIPPED | OUTPATIENT
Start: 2017-10-30 | End: 2018-03-30

## 2017-10-30 NOTE — PROGRESS NOTES
"  SUBJECTIVE:   Malcolm Barker is a 77 year old male who presents to clinic today for the following health issues:    Pt is here to establish care.     Ischemic cardiomyopathy d/t ASCVD in 2013.  MI occurred while in [place], [place].  Has approx 7 stents with last placed in April.   Followed by cardiology though [facility].  EF has been around 30%.    Exercises nearly every day - [employer] and does a regimen of elliptical and weights.  Silver Sneakers on Tu / Th and playing pickleball.    Peripheral neuropathy.  Exact cause is not known.  Slowly progressive.  Has been evaluated by neurology.  Mainly in the feet, up to mid calf now.  Over the past few months has started having sx in the fingers.  Taking gabapentin which helps somewhat.    Has atrial fibrillation. Anticoagulated w/ warfarin. Will be changing to our clinic for INR management.     GERD. Taking omeprazole. Helping to keep sx controlled.     Problem list and histories reviewed & adjusted, as indicated.    Labs reviewed in EPIC    Reviewed and updated as needed this visit by clinical staff  Tobacco  Allergies  Meds  Med Hx  Surg Hx  Fam Hx  Soc Hx      Reviewed and updated as needed this visit by Provider  Tobacco  Allergies  Meds  Problems  Med Hx  Surg Hx  Fam Hx  Soc Hx          ROS:  Constitutional, HEENT, cardiovascular, pulmonary, gi and gu systems are negative, except as otherwise noted.      OBJECTIVE:   BP (!) 80/50 (Cuff Size: Adult Large)  Pulse 57  Temp 97.5  F (36.4  C) (Oral)  Ht 5' 11\" (1.803 m)  Wt 205 lb (93 kg)  SpO2 98%  BMI 28.59 kg/m2  Body mass index is 28.59 kg/(m^2).  GEN: no distress  SKIN: no rashes  HEENT: PERRL. No nasal d/c. OP moist.  NECK: Supple. No LAD.  CV: irregular but normal rate. No M, R, G. Pulses 2+ wyatt radial.  LUNGS: Clear to auscultation bilaterally. No rhonchi, rales, wheezes or retractions.  ABD: Bowel sounds positive throughout. Soft, nontender, nondistended.   EXTR: Trace LE edema  PSYCH: " Normal affect. Well groomed. Good eye contact.  NEURO: no focal deficits    ASSESSMENT/PLAN:       ICD-10-CM    1. Coronary artery disease involving native coronary artery of native heart without angina pectoris I25.10    2. Chronic systolic congestive heart failure (H) I50.22    3. Chronic atrial fibrillation (H) I48.2    4. 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 Idiopathic peripheral neuropathy G60.9 gabapentin (NEURONTIN) 100 MG capsule     Overall is feeling well. Cardiac status is stable.  He continues to follow w/ cardiology as well.  No changes to medications today.    Neuropathy. Discussed refills of gabapentin.     Patient Instructions   No change with your medications today    Tod Callahan MD  [facility]

## 2017-10-30 NOTE — MR AVS SNAPSHOT
After Visit Summary   10/30/2017    Malcolm HENDERSON [facility]    MRN: [id number]           Patient Information     Date Of Birth          1940        Visit Information        Provider Department      10/30/2017 2:00 PM Tod Callahan MD [facility]        Today's Diagnoses     Coronary artery disease involving native coronary artery of native heart without angina pectoris    -  1    Chronic systolic congestive heart failure (H)        Chronic atrial fibrillation (H)        Idiopathic progressive neuropathy        Idiopathic peripheral neuropathy          Care Instructions    No change with your medications today          Follow-ups after your visit        Your next 10 appointments already scheduled     Dec 04, 2017  1:00 PM CST   Anticoagulation Visit with  ANTICOAGULATION CLINIC   [facility]    [address]  [address]  [place] [address] [phone]            Mar 30, 2018  8:10 AM CDT   PHYSICAL with Tod Callahan MD   [facility])    [address]  [address]  [place] [address] [phone]              Who to contact     If you have questions or need follow up information about today's clinic visit or your schedule please contact [facility] directly at [phone].  Normal or non-critical lab and imaging results will be communicated to you by MyChart, letter or phone within 4 business days after the clinic has received the results. If you do not hear from us within 7 days, please contact the clinic through MyChart or phone. If you have a critical or abnormal lab result, we will notify you by phone as soon as possible.  Submit refill requests through [employer] or call your pharmacy and they will forward the refill request to us. Please allow 3 business days for your refill to be completed.          Additional Information About Your Visit        [facility]  "Information     Son gives you secure access to your electronic health record. If you see a primary care provider, you can also send messages to your care team and make appointments. If you have questions, please call your primary care clinic.  If you do not have a primary care provider, please call [phone] and they will assist you.        Care EveryWhere ID     This is your Care EveryWhere ID. This could be used by other organizations to access your [facility] medical records  [id number]        Your Vitals Were     Pulse Temperature Height Pulse Oximetry BMI (Body Mass Index)       57 97.5  F (36.4  C) (Oral) 5' 11\" (1.803 m) 98% 28.59 kg/m2        Blood Pressure from Last 3 Encounters:   10/30/17 (!) 80/50   04/05/17 96/64   03/29/16 128/70    Weight from Last 3 Encounters:   10/30/17 205 lb (93 kg)   04/05/17 200 lb (90.7 kg)   03/29/16 200 lb (90.7 kg)              Today, you had the following     No orders found for display         Where to get your medicines      These medications were sent to [facility] [address] [address]     Phone:  [phone]     gabapentin 100 MG capsule          Primary Care Provider Office Phone # Fax #    David Jalloh [phone]       [address] [facility] [address]        Equal Access to Services     [facility]: Hadii aad ku hadasho Soomaali, waaxda luqadaha, qaybta kaalmada adeegyada, dexter correa. So [facility] [phone].    ATENCIÓN: Si habla español, tiene a espinosa disposición servicios gratuitos de asistencia lingüística. Apolonia al [phone].    We comply with applicable federal civil rights laws and [place] laws. We do not discriminate on the basis of race, color, national origin, age, disability, sex, sexual orientation, or gender identity.            Thank you!     Thank you for choosing [facility]  for your care. " Our goal is always to provide you with excellent care. Hearing back from our patients is one way we can continue to improve our services. Please take a few minutes to complete the written survey that you may receive in the mail after your visit with us. Thank you!             Your Updated Medication List - Protect others around you: Learn how to safely use, store and throw away your medicines at www.disposemymeds.org.          This list is accurate as of: 10/30/17  3:06 PM.  Always use your most recent med list.                   Brand Name Dispense Instructions for use Diagnosis    bumetanide 1 MG tablet    BUMEX     Take 1 mg by mouth daily 2 in PM        CoQ10 200 MG Caps      Take by mouth daily        COREG 3.125 MG tablet   Generic drug:  carvedilol      Take 3.125 mg by mouth 2 times daily (with meals)        fluticasone 50 MCG/ACT spray    FLONASE    1 Package    Spray 1-2 sprays into both nostrils daily    Postnasal discharge       gabapentin 100 MG capsule    NEURONTIN    270 capsule    Take 1 capsule (100 mg) by mouth 3 times daily    Idiopathic peripheral neuropathy       GLUCOSAMINE CHONDR 1500 COMPLX PO      Take by mouth daily        LIPITOR 40 MG tablet   Generic drug:  atorvastatin      Take 40 mg by mouth daily        lisinopril 2.5 MG tablet    PRINIVIL/Zestril     Take 5 mg by mouth daily.        Magnesium Oxide 250 MG Tabs      Take by mouth daily        metoprolol 25 MG 24 hr tablet    TOPROL-XL     Take 25 mg by mouth daily 2 tablets daily        MULTIVITAMIN PO      Take  by mouth.        * omeprazole 40 MG capsule    priLOSEC     Take 1 capsule by mouth daily        * omeprazole 20 MG CR capsule    priLOSEC    180 capsule    Take 1-2 capsules (20-40 mg) by mouth daily    Long-term (current) use of anticoagulants, Gastroesophageal reflux disease without esophagitis       PLAVIX 75 MG tablet   Generic drug:  clopidogrel      Take 1 tablet by mouth daily        potassium chloride 20 MEQ Packet     KLOR-CON     Take 20 mEq by mouth 2 times daily        sildenafil 50 MG tablet    VIAGRA    30 tablet    Take 1 tablet by mouth See Admin Instructions.    Routine physical examination       timolol 0.5 % ophthalmic solution    TIMOPTIC     Place 1 drop into the right eye daily        vitamin D 1000 UNITS capsule      Take 1 capsule by mouth daily        warfarin 5 MG tablet    COUMADIN    72 tablet    Take a half tablet Wed and Sat and one tablet Sun, Mon, Tu, Th, Fri or as directed by INR Clinic    Chronic atrial fibrillation (H)       XALATAN OP      Apply 1 drop to eye daily        * Notice:  This list has 2 medication(s) that are the same as other medications prescribed for you. Read the directions carefully, and ask your doctor or other care provider to review them with you.

## 2017-10-30 NOTE — PROGRESS NOTES
ANTICOAGULATION FOLLOW-UP CLINIC VISIT    Patient Name:  Malcolm Barker  Date:  10/30/2017  Contact Type:  Face to Face    SUBJECTIVE:     Patient Findings     Positives No Problem Findings           OBJECTIVE    INR Protime   Date Value Ref Range Status   10/30/2017 2.6 (A) 0.86 - 1.14 Final       ASSESSMENT / PLAN  INR assessment THER    Recheck INR In: 5 WEEKS    INR Location Clinic      Anticoagulation Summary as of 10/30/2017     INR goal 2.0-3.0   Today's INR 2.6   Maintenance plan 2.5 mg (5 mg x 0.5) on Wed, Sat; 5 mg (5 mg x 1) all other days   Full instructions 2.5 mg on Wed, Sat; 5 mg all other days   Weekly total 30 mg   No change documented Nay Trejo RN   Plan last modified Mandi Phoenix RN (6/10/2016)   Next INR check 12/4/2017   Priority INR   Target end date Indefinite    Indications   Long-term (current) use of anticoagulants [Z79.01] [Z79.01]  Chronic atrial fibrillation (H) [I48.2]         Anticoagulation Episode Summary     INR check location     Preferred lab     Send INR reminders to [facility]    Comments 5mg tabs // APPT CARD      Anticoagulation Care Providers     Provider Role Specialty Phone number    David Jalloh MD Responsible Internal Medicine [phone]            See the Encounter Report to view Anticoagulation Flowsheet and Dosing Calendar (Go to Encounters tab in chart review, and find the Anticoagulation Therapy Visit)      Nay Trejo, RN

## 2017-10-30 NOTE — NURSING NOTE
"Chief Complaint   Patient presents with     Establish Care       Initial BP (!) 80/50 (Cuff Size: Adult Large)  Pulse 57  Temp 97.5  F (36.4  C) (Oral)  Ht 5' 11\" (1.803 m)  Wt 205 lb (93 kg)  SpO2 98%  BMI 28.59 kg/m2 Estimated body mass index is 28.59 kg/(m^2) as calculated from the following:    Height as of this encounter: 5' 11\" (1.803 m).    Weight as of this encounter: 205 lb (93 kg).  Medication Reconciliation: complete    Jazlyn Rg   "

## 2017-10-30 NOTE — MR AVS SNAPSHOT
[facility]   10/30/2017 1:00 PM   Anticoagulation Therapy Visit    Description:  77 year old male   Provider:  [facility]   Department:  [facility] Nurse           INR as of 10/30/2017     Today's INR 2.6      Anticoagulation Summary as of 10/30/2017     INR goal 2.0-3.0   Today's INR 2.6   Full instructions 2.5 mg on Wed, Sat; 5 mg all other days   Next INR check 12/4/2017    Indications   Long-term (current) use of anticoagulants [Z79.01] [Z79.01]  Chronic atrial fibrillation (H) [I48.2]         Your next Anticoagulation Clinic appointment(s)     Dec 04, 2017  1:00 PM CST   Anticoagulation Visit with  ANTICOAGULATION CLINIC   [facility]    [address]  [address]  [place] [facility] [place] [address] [phone]              Contact Numbers     [facility]  Please call  [phone] to cancel and/or reschedule your appointment   Please call  [phone] with any problems or questions regarding your therapy.        October 2017 Details    Sun Mon Tue Wed Thu Fri Sat     1               2               3               4               5               6               7                 8               9               10               11               12               13               14                 15               16               17               18               19               20               21                 22               23               24               25               26               27               28                 29               30      5 mg   See details      31      5 mg              Date Details   10/30 This INR check               How to take your warfarin dose     To take:  5 mg Take 1 of the 5 mg tablets.           November 2017 Details    Sun Mon Tue Wed Thu Fri Sat        1      2.5 mg         2      5 mg         3      5 mg         4      2.5 mg           5      5 mg         6      5 mg         7      5 mg          8      2.5 mg         9      5 mg         10      5 mg         11      2.5 mg           12      5 mg         13      5 mg         14      5 mg         15      2.5 mg         16      5 mg         17      5 mg         18      2.5 mg           19      5 mg         20      5 mg         21      5 mg         22      2.5 mg         23      5 mg         24      5 mg         25      2.5 mg           26      5 mg         27      5 mg         28      5 mg         29      2.5 mg         30      5 mg            Date Details   No additional details            How to take your warfarin dose     To take:  2.5 mg Take 0.5 of a 5 mg tablet.    To take:  5 mg Take 1 of the 5 mg tablets.           December 2017 Details    Sun Mon Tue Wed Thu Fri Sat          1      5 mg         2      2.5 mg           3      5 mg         4            5               6               7               8               9                 10               11               12               13               14               15               16                 17               18               19               20               21               22               23                 24               25               26               27               28               29               30                 31                      Date Details   No additional details    Date of next INR:  12/4/2017         How to take your warfarin dose     To take:  2.5 mg Take 0.5 of a 5 mg tablet.    To take:  5 mg Take 1 of the 5 mg tablets.

## 2017-12-04 ENCOUNTER — ANTICOAGULATION THERAPY VISIT (OUTPATIENT)
Dept: NURSING | Facility: CLINIC | Age: 77
End: 2017-12-04
Payer: COMMERCIAL

## 2017-12-04 DIAGNOSIS — Z79.01 LONG-TERM (CURRENT) USE OF ANTICOAGULANTS: ICD-10-CM

## 2017-12-04 DIAGNOSIS — I48.20 CHRONIC ATRIAL FIBRILLATION (H): ICD-10-CM

## 2017-12-04 LAB — INR POINT OF CARE: 1.3 (ref 0.86–1.14)

## 2017-12-04 PROCEDURE — 85610 PROTHROMBIN TIME: CPT | Mod: QW

## 2017-12-04 PROCEDURE — 99207 ZZC NO CHARGE NURSE ONLY: CPT

## 2017-12-04 PROCEDURE — 36416 COLLJ CAPILLARY BLOOD SPEC: CPT

## 2017-12-04 NOTE — PROGRESS NOTES
ANTICOAGULATION FOLLOW-UP CLINIC VISIT    Patient Name:  Malcolm Barker  Date:  12/4/2017  Contact Type:  Face to Face    SUBJECTIVE:     Patient Findings     Positives No Problem Findings, Unexplained INR or factor level change           OBJECTIVE    INR Protime   Date Value Ref Range Status   12/04/2017 1.3 (A) 0.86 - 1.14 Final       ASSESSMENT / PLAN  No question data found.  Anticoagulation Summary as of 12/4/2017     INR goal 2.0-3.0   Today's INR 1.3!   Maintenance plan 2.5 mg (5 mg x 0.5) on Wed, Sat; 5 mg (5 mg x 1) all other days   Full instructions 12/4: 7.5 mg; Otherwise 2.5 mg on Wed, Sat; 5 mg all other days   Weekly total 30 mg   Plan last modified Mandi Phoenix RN (6/10/2016)   Next INR check 12/18/2017   Priority INR   Target end date Indefinite    Indications   Long-term (current) use of anticoagulants [Z79.01] [Z79.01]  Chronic atrial fibrillation (H) [I48.2]         Anticoagulation Episode Summary     INR check location     Preferred lab     Send INR reminders to [facility]    Comments 5mg tabs // APPT CARD      Anticoagulation Care Providers     Provider Role Specialty Phone number    David Jalloh MD Responsible Internal Medicine [phone]            See the Encounter Report to view Anticoagulation Flowsheet and Dosing Calendar (Go to Encounters tab in chart review, and find the Anticoagulation Therapy Visit)        Vianca Kent RN

## 2017-12-04 NOTE — MR AVS SNAPSHOT
[facility]   12/4/2017 1:00 PM   Anticoagulation Therapy Visit    Description:  77 year old male   Provider:   [facility]INIC   Department:   Nurse           INR as of 12/4/2017     Today's INR 1.3!      Anticoagulation Summary as of 12/4/2017     INR goal 2.0-3.0   Today's INR 1.3!   Full instructions 12/4: 7.5 mg; Otherwise 2.5 mg on Wed, Sat; 5 mg all other days   Next INR check 12/18/2017    Indications   Long-term (current) use of anticoagulants [Z79.01] [Z79.01]  Chronic atrial fibrillation (H) [I48.2]         Your next Anticoagulation Clinic appointment(s)     Dec 18, 2017  1:15 PM CST   Anticoagulation Visit with  ANTICOAGULATION CLINIC   [facility] ([facility])    [address] [place] [address]  [address]  [facility] [id number] [phone]              Contact Numbers     [facility]  Please call  [phone] to cancel and/or reschedule your appointment   Please call  [phone] with any problems or questions regarding your therapy.        December 2017 Details    Sun Mon Tue Wed Thu Fri Sat          1               2                 3               4      7.5 mg   See details      5      5 mg         6      2.5 mg         7      5 mg         8      5 mg         9      2.5 mg           10      5 mg         11      5 mg         12      5 mg         13      2.5 mg         14      5 mg         15      5 mg         16      2.5 mg           17      5 mg         18            19               20               21               22               23                 24               25               26               27               28               29               30                 31                      Date Details   12/04 This INR check       Date of next INR:  12/18/2017         How to take your warfarin dose     To take:  2.5 mg Take 0.5 of a 5 mg tablet.    To take:  5 mg Take 1 of the 5 mg tablets.    To take:  7.5 mg Take 1.5 of the 5 mg tablets.

## 2017-12-18 ENCOUNTER — ANTICOAGULATION THERAPY VISIT (OUTPATIENT)
Dept: NURSING | Facility: CLINIC | Age: 77
End: 2017-12-18
Payer: COMMERCIAL

## 2017-12-18 DIAGNOSIS — I48.20 CHRONIC ATRIAL FIBRILLATION (H): ICD-10-CM

## 2017-12-18 DIAGNOSIS — Z79.01 LONG-TERM (CURRENT) USE OF ANTICOAGULANTS: ICD-10-CM

## 2017-12-18 LAB — INR POINT OF CARE: 2 (ref 0.9–1.1)

## 2017-12-18 PROCEDURE — 36416 COLLJ CAPILLARY BLOOD SPEC: CPT

## 2017-12-18 PROCEDURE — 99207 ZZC NO CHARGE NURSE ONLY: CPT

## 2017-12-18 PROCEDURE — 85610 PROTHROMBIN TIME: CPT | Mod: QW

## 2017-12-18 NOTE — PROGRESS NOTES
ANTICOAGULATION FOLLOW-UP CLINIC VISIT    Patient Name:  Malcolm Barker  Date:  12/18/2017  Contact Type:  Face to Face    SUBJECTIVE:     Patient Findings     Positives No Problem Findings           OBJECTIVE    INR Protime   Date Value Ref Range Status   12/18/2017 2.0  Final       ASSESSMENT / PLAN  INR assessment THER    Recheck INR In: 3 WEEKS    INR Location Clinic      Anticoagulation Summary as of 12/18/2017     INR goal 2.0-3.0   Today's INR 2.0   Maintenance plan 2.5 mg (5 mg x 0.5) on Wed, Sat; 5 mg (5 mg x 1) all other days   Full instructions 2.5 mg on Wed, Sat; 5 mg all other days   Weekly total 30 mg   No change documented Alexandrea Looney   Plan last modified Mandi Phoenix RN (6/10/2016)   Next INR check 1/8/2018   Priority INR   Target end date Indefinite    Indications   Long-term (current) use of anticoagulants [Z79.01] [Z79.01]  Chronic atrial fibrillation (H) [I48.2]         Anticoagulation Episode Summary     INR check location     Preferred lab     Send INR reminders to [facility]    Comments 5mg tabs // APPT CARD      Anticoagulation Care Providers     Provider Role Specialty Phone number    David Jalloh MD Responsible Internal Medicine [phone]            See the Encounter Report to view Anticoagulation Flowsheet and Dosing Calendar (Go to Encounters tab in chart review, and find the Anticoagulation Therapy Visit)    Alexandrea Looney RN

## 2017-12-18 NOTE — MR AVS SNAPSHOT
Malcolm Barker   12/18/2017 1:15 PM   Anticoagulation Therapy Visit    Description:  77 year old male   Provider:  [facility]   Department:   Nurse           INR as of 12/18/2017     Today's INR 2.0      Anticoagulation Summary as of 12/18/2017     INR goal 2.0-3.0   Today's INR 2.0   Full instructions 2.5 mg on Wed, Sat; 5 mg all other days   Next INR check 1/8/2018    Indications   Long-term (current) use of anticoagulants [Z79.01] [Z79.01]  Chronic atrial fibrillation (H) [I48.2]         Your next Anticoagulation Clinic appointment(s)     Jan 08, 2018  1:30 PM CST   Anticoagulation Visit with  ANTICOAGULATION CLINIC   [facility] ([facility])    [address]  [address]  [facility] [address] [phone]              Contact Numbers     [facility]  Please call  [phone] to cancel and/or reschedule your appointment   Please call  [phone] with any problems or questions regarding your therapy.        December 2017 Details    Sun Mon Tue Wed Thu Fri Sat          1               2                 3               4               5               6               7               8               9                 10               11               12               13               14               15               16                 17               18      5 mg   See details      19      5 mg         20      2.5 mg         21      5 mg         22      5 mg         23      2.5 mg           24      5 mg         25      5 mg         26      5 mg         27      2.5 mg         28      5 mg         29      5 mg         30      2.5 mg           31      5 mg                Date Details   12/18 This INR check               How to take your warfarin dose     To take:  2.5 mg Take 0.5 of a 5 mg tablet.    To take:  5 mg Take 1 of the 5 mg tablets.           January 2018 Details    Sun Mon Tue Wed Thu Fri Sat      1      5 mg         2      5 mg          3      2.5 mg         4      5 mg         5      5 mg         6      2.5 mg           7      5 mg         8            9               10               11               12               13                 14               15               16               17               18               19               20                 21               22               23               24               25               26               27                 28               29               30               31                   Date Details   No additional details    Date of next INR:  1/8/2018         How to take your warfarin dose     To take:  2.5 mg Take 0.5 of a 5 mg tablet.    To take:  5 mg Take 1 of the 5 mg tablets.

## 2018-01-08 ENCOUNTER — ANTICOAGULATION THERAPY VISIT (OUTPATIENT)
Dept: NURSING | Facility: CLINIC | Age: 78
End: 2018-01-08
Payer: COMMERCIAL

## 2018-01-08 ENCOUNTER — OFFICE VISIT (OUTPATIENT)
Dept: PEDIATRICS | Facility: CLINIC | Age: 78
End: 2018-01-08
Payer: COMMERCIAL

## 2018-01-08 VITALS
BODY MASS INDEX: 28.73 KG/M2 | HEART RATE: 68 BPM | DIASTOLIC BLOOD PRESSURE: 80 MMHG | SYSTOLIC BLOOD PRESSURE: 124 MMHG | WEIGHT: 206 LBS | TEMPERATURE: 97.4 F | OXYGEN SATURATION: 95 %

## 2018-01-08 DIAGNOSIS — I48.20 CHRONIC ATRIAL FIBRILLATION (H): ICD-10-CM

## 2018-01-08 DIAGNOSIS — Z79.01 LONG-TERM (CURRENT) USE OF ANTICOAGULANTS: ICD-10-CM

## 2018-01-08 DIAGNOSIS — J20.9 ACUTE BRONCHITIS, UNSPECIFIED ORGANISM: Primary | ICD-10-CM

## 2018-01-08 DIAGNOSIS — R06.2 WHEEZING: ICD-10-CM

## 2018-01-08 LAB — INR POINT OF CARE: 2.5 (ref 0.86–1.14)

## 2018-01-08 PROCEDURE — 99207 ZZC NO CHARGE NURSE ONLY: CPT

## 2018-01-08 PROCEDURE — 36416 COLLJ CAPILLARY BLOOD SPEC: CPT

## 2018-01-08 PROCEDURE — 99213 OFFICE O/P EST LOW 20 MIN: CPT | Performed by: INTERNAL MEDICINE

## 2018-01-08 PROCEDURE — 85610 PROTHROMBIN TIME: CPT | Mod: QW

## 2018-01-08 RX ORDER — ALBUTEROL SULFATE 90 UG/1
2 AEROSOL, METERED RESPIRATORY (INHALATION) EVERY 4 HOURS PRN
Qty: 1 INHALER | Refills: 1 | Status: SHIPPED | OUTPATIENT
Start: 2018-01-08 | End: 2018-03-30

## 2018-01-08 RX ORDER — AZITHROMYCIN 500 MG/1
500 TABLET, FILM COATED ORAL DAILY
Qty: 3 TABLET | Refills: 0 | Status: SHIPPED | OUTPATIENT
Start: 2018-01-08 | End: 2018-01-23

## 2018-01-08 NOTE — PROGRESS NOTES
ANTICOAGULATION FOLLOW-UP CLINIC VISIT    [facility]  Date:  1/8/2018  Contact Type:  Face to Face    SUBJECTIVE:        OBJECTIVE    INR Protime   Date Value Ref Range Status   01/08/2018 2.5 (A) 0.86 - 1.14 Final       ASSESSMENT / PLAN  INR assessment THER    Recheck INR In: 2 WEEKS    INR Location Clinic      Anticoagulation Summary as of 1/8/2018     INR goal 2.0-3.0   Today's INR 2.5   Maintenance plan 2.5 mg (5 mg x 0.5) on Wed, Sat; 5 mg (5 mg x 1) all other days   Full instructions 2.5 mg on Wed, Sat; 5 mg all other days   Weekly total 30 mg   No change documented Yudy Plunkett, RN   Plan last modified Mandi Phoenix RN (6/10/2016)   Next INR check 1/22/2018   Priority INR   Target end date Indefinite    Indications   Long-term (current) use of anticoagulants [Z79.01] [Z79.01]  Chronic atrial fibrillation (H) [I48.2]         Anticoagulation Episode Summary     INR check location     Preferred lab     Send INR reminders to  [facility]    Comments 5mg tabs // APPT CARD      Anticoagulation Care Providers     Provider Role Specialty Phone number    David Jalloh MD Responsible Internal Medicine [phone]            See the Encounter Report to view Anticoagulation Flowsheet and Dosing Calendar (Go to Encounters tab in chart review, and find the Anticoagulation Therapy Visit)    Yudy Plunkett RN               ANTICOAGULATION FOLLOW-UP CLINIC VISIT    Patient Name:  Malcolm HENDERSON [facility]  Date:  1/8/2018  Contact Type:  Face to Face    SUBJECTIVE:     Patient Findings     Positives Antibiotic use or infection    Comments Denies bleeding/bruising or missed dose. Starting zithromax x 3 days for acute bronchitis, has been taking otc peggy selzer & tylenol for cold sx's.              OBJECTIVE    INR Protime   Date Value Ref Range Status   01/08/2018 2.5 (A) 0.86 - 1.14 Final       ASSESSMENT / PLAN  INR assessment THER    Recheck INR In: 2 WEEKS    INR  Location Clinic      Anticoagulation Summary as of 1/8/2018     INR goal 2.0-3.0   Today's INR 2.5   Maintenance plan 2.5 mg (5 mg x 0.5) on Wed, Sat; 5 mg (5 mg x 1) all other days   Full instructions 2.5 mg on Wed, Sat; 5 mg all other days   Weekly total 30 mg   No change documented Yudy Plunkett RN   Plan last modified Mandi Phoenix RN (6/10/2016)   Next INR check 1/22/2018   Priority INR   Target end date Indefinite    Indications   Long-term (current) use of anticoagulants [Z79.01] [Z79.01]  Chronic atrial fibrillation (H) [I48.2]         Anticoagulation Episode Summary     INR check location     Preferred lab     Send INR reminders to  [facility]    Comments 5mg tabs // APPT CARD      Anticoagulation Care Providers     Provider Role Specialty Phone number    David Jalloh MD [facility] Internal Medicine [phone]            See the Encounter Report to view Anticoagulation Flowsheet and Dosing Calendar (Go to Encounters tab in chart review, and find the Anticoagulation Therapy Visit)    Yudy Plunkett RN

## 2018-01-08 NOTE — NURSING NOTE
"Chief Complaint   Patient presents with     URI       Initial /80 (Cuff Size: Adult Large)  Pulse 68  Temp 97.4  F (36.3  C) (Oral)  Wt 206 lb (93.4 kg)  SpO2 95%  BMI 28.73 kg/m2 Estimated body mass index is 28.73 kg/(m^2) as calculated from the following:    Height as of 10/30/17: 5' 11\" (1.803 m).    Weight as of this encounter: 206 lb (93.4 kg).  Medication Reconciliation: complete    Jazlyn Rg   "

## 2018-01-08 NOTE — PATIENT INSTRUCTIONS
Azithromycin 500mg (antibiotic)   1 pill per day for 3 days   The medication continues working for 1 week after you stop taking the pills    Albuterol inhaler    2 puffs up to every 4 hours as needed for wheezing / cough

## 2018-01-08 NOTE — MR AVS SNAPSHOT
Malcolm BEATRIZ Zambranocarlostin   1/8/2018 1:30 PM   Anticoagulation Therapy Visit    Description:  77 year old male   Provider:   [facility]   Department:   Nurse           INR as of 1/8/2018     Today's INR 2.5      Anticoagulation Summary as of 1/8/2018     INR goal 2.0-3.0   Today's INR 2.5   Full instructions 2.5 mg on Wed, Sat; 5 mg all other days   Next INR check 1/22/2018    Indications   Long-term (current) use of anticoagulants [Z79.01] [Z79.01]  Chronic atrial fibrillation (H) [I48.2]         Your next Anticoagulation Clinic appointment(s)     Jan 08, 2018  1:30 PM CST   Anticoagulation Visit with  ANTICOAGULATION CLINIC   [facility] ([facility]    [address] [place] [address]  [address]  [facility] [address] [phone]            Jan 22, 2018  1:30 PM CST   Anticoagulation Visit with  ANTICOAGULATION CLINIC   [facility] ([facility])    [address] [place] [address]  [address]  [facility] [address] [phone]              Contact Numbers     [facility]  Please call  [phone] to cancel and/or reschedule your appointment   Please call  [phone] with any problems or questions regarding your therapy.        January 2018 Details    Sun Mon Tue Wed Thu Fri Sat      1               2               3               4               5               6                 7               8      5 mg   See details      9      5 mg         10      2.5 mg         11      5 mg         12      5 mg         13      2.5 mg           14      5 mg         15      5 mg         16      5 mg         17      2.5 mg         18      5 mg         19      5 mg         20      2.5 mg           21      5 mg         22            23               24               25               26               27                 28               29               30               31                   Date Details   01/08 This INR check       Date of next INR:  1/22/2018          How to take your warfarin dose     To take:  2.5 mg Take 0.5 of a 5 mg tablet.    To take:  5 mg Take 1 of the 5 mg tablets.

## 2018-01-08 NOTE — PROGRESS NOTES
SUBJECTIVE:   Malcolm Barker is a 77 year old male who presents to clinic today for the following health issues:      RESPIRATORY SYMPTOMS      Duration: 1/4    Description  cough, wheezing and fatigue/malaise    Severity: mild    Accompanying signs and symptoms: None    History (predisposing factors):  none    Precipitating or alleviating factors: None    Therapies tried and outcome:  oral decongestant      Problem list and histories reviewed & adjusted, as indicated.    Labs reviewed in EPIC    Reviewed and updated as needed this visit by clinical staffTobacco  Allergies  Meds  Med Hx  Surg Hx  Fam Hx  Soc Hx      Reviewed and updated as needed this visit by Provider  Tobacco  Allergies  Meds  Problems  Med Hx  Surg Hx  Fam Hx  Soc Hx            OBJECTIVE:     /80 (Cuff Size: Adult Large)  Pulse 68  Temp 97.4  F (36.3  C) (Oral)  Wt 206 lb (93.4 kg)  SpO2 95%  BMI 28.73 kg/m2  Body mass index is 28.73 kg/(m^2).  GEN: no distress  SKIN: no rashes  HEENT: PERRL. EOMI. TM's clear bilaterally. Nasal mucosa w/ edema. OP moist without lesions.  NECK: Supple.  LUNGS: Scattered inspiratory wheezes. No rhonchi or rales. Good air entry.  CV: Regular rate and rhythm.  No murmurs, rubs or gallops. Pulses 2+ radial.  EXTR: no edema    ASSESSMENT/PLAN:       ICD-10-CM    1. Acute bronchitis, unspecified organism J20.9 albuterol (PROAIR HFA/PROVENTIL HFA/VENTOLIN HFA) 108 (90 BASE) MCG/ACT Inhaler     azithromycin (ZITHROMAX) 500 MG tablet   2. Wheezing R06.2 albuterol (PROAIR HFA/PROVENTIL HFA/VENTOLIN HFA) 108 (90 BASE) MCG/ACT Inhaler     Sx are potentially viral but cannot r/o bacterial    Patient Instructions   Azithromycin 500mg (antibiotic)   1 pill per day for 3 days   The medication continues working for 1 week after you stop taking the pills    Albuterol inhaler    2 puffs up to every 4 hours as needed for wheezing / cough     Tod Callahan MD  [facility]

## 2018-01-08 NOTE — MR AVS SNAPSHOT
After Visit Summary   1/8/2018    Malcolm HENDERSON [facility]    MRN: [id number]           Patient Information     Date Of Birth          1940        Visit Information        Provider Department      1/8/2018 11:20 AM Tod Callahan MD [facility]        Today's Diagnoses     Acute bronchitis, unspecified organism    -  1    Wheezing          Care Instructions    Azithromycin 500mg (antibiotic)   1 pill per day for 3 days   The medication continues working for 1 week after you stop taking the pills    Albuterol inhaler    2 puffs up to every 4 hours as needed for wheezing / cough           Follow-ups after your visit        Your next 10 appointments already scheduled     Jan 08, 2018  1:30 PM CST   Anticoagulation Visit with  ANTICOAGULATION CLINIC   [facility]    [address] [place] [address]  [address]  [facility] [address]   [phone]            Jan 23, 2018 11:00 AM CST   Pre-Op physical with Tod Callahan MD   [facility]    [address] [place] [address]  [address]  [facility] [address] [phone]            Mar 30, 2018  8:10 AM CDT   PHYSICAL with Tod Callahan MD   [facility]    [address] [place] [address]  [address]  [facility] [address]   [phone]              Who to contact     If you have questions or need follow up information about today's clinic visit or your schedule please contact [facility] directly at [phone].  Normal or non-critical lab and imaging results will be communicated to you by MyChart, letter or phone within 4 business days after the clinic has received the results. If you do not hear from us within 7 days, please contact the clinic through MyChart or phone. If you have a critical or abnormal lab result, we will notify you by phone as soon as possible.  Submit refill requests through [employer] or call your pharmacy and  they will forward the refill request to us. Please allow 3 business days for your refill to be completed.          Additional Information About Your Visit        [employer] [employer] Information     [employer] gives you secure access to your electronic health record. If you see a primary care provider, you can also send messages to your care team and make appointments. If you have questions, please call your primary care clinic.  If you do not have a primary care provider, please call [phone] and they will assist you.        Care EveryWhere ID     This is your Care EveryWhere ID. This could be used by other organizations to access your [facility] medical records  [id number]        Your Vitals Were     Pulse Temperature Pulse Oximetry BMI (Body Mass Index)          68 97.4  F (36.3  C) (Oral) 95% 28.73 kg/m2         Blood Pressure from Last 3 Encounters:   01/08/18 124/80   10/30/17 (!) 80/50   04/05/17 96/64    Weight from Last 3 Encounters:   01/08/18 206 lb (93.4 kg)   10/30/17 205 lb (93 kg)   04/05/17 200 lb (90.7 kg)              Today, you had the following     No orders found for display         Today's Medication Changes          These changes are accurate as of: 1/8/18 11:44 AM.  If you have any questions, ask your nurse or doctor.               Start taking these medicines.        Dose/Directions    albuterol 108 (90 BASE) MCG/ACT Inhaler   Commonly known as:  PROAIR HFA/PROVENTIL HFA/VENTOLIN HFA   Used for:  Acute bronchitis, unspecified organism, Wheezing   Started by:  Tod Callahan MD        Dose:  2 puff   Inhale 2 puffs into the lungs every 4 hours as needed for wheezing   Quantity:  1 Inhaler   Refills:  1       azithromycin 500 MG tablet   Commonly known as:  ZITHROMAX   Used for:  Acute bronchitis, unspecified organism   Started by:  Tod Callahan MD        Dose:  500 mg   Take 1 tablet (500 mg) by mouth daily   Quantity:  3 tablet   Refills:  0            Where to get your medicines      These  medications were sent to [id number]  [place], [place] - [address]  [address], [place] [address]     Phone:  [phone]     albuterol 108 (90 BASE) MCG/ACT Inhaler    azithromycin 500 MG tablet                Primary Care Provider Office Phone # Fax #    Tod Callahan [phone] [address] [place] [address]        Equal Access to Services     [facility]: Hadii aad ku hadasho Soomaali, waaxda luqadaha, qaybta kaalmada adeegyada, waxay idiin hayaan adeeg kharash latatyana . So [facility] [phone].    ATENCIÓN: Si habla español, tiene a espinosa disposición servicios gratuitos de asistencia lingüística. [facility] [phone].    We comply with applicable federal civil rights laws and [place] laws. We do not discriminate on the basis of race, color, national origin, age, disability, sex, sexual orientation, or gender identity.            Thank you!     Thank you for choosing [facility]  for your care. Our goal is always to provide you with excellent care. Hearing back from our patients is one way we can continue to improve our services. Please take a few minutes to complete the written survey that you may receive in the mail after your visit with us. Thank you!             Your Updated Medication List - Protect others around you: Learn how to safely use, store and throw away your medicines at www.disposemymeds.org.          This list is accurate as of: 1/8/18 11:44 AM.  Always use your most recent med list.                   Brand Name Dispense Instructions for use Diagnosis    albuterol 108 (90 BASE) MCG/ACT Inhaler    PROAIR HFA/PROVENTIL HFA/VENTOLIN HFA    1 Inhaler    Inhale 2 puffs into the lungs every 4 hours as needed for wheezing    Acute bronchitis, unspecified organism, Wheezing       azithromycin 500 MG tablet    ZITHROMAX    3 tablet    Take 1 tablet (500 mg) by mouth daily    Acute bronchitis, unspecified organism       bumetanide 1  MG tablet    BUMEX     Take 1 mg by mouth daily 2 in PM        CoQ10 200 MG Caps      Take by mouth daily        COREG 3.125 MG tablet   Generic drug:  carvedilol      Take 3.125 mg by mouth 2 times daily (with meals)        fluticasone 50 MCG/ACT spray    FLONASE    1 Package    Spray 1-2 sprays into both nostrils daily    Postnasal discharge       gabapentin 100 MG capsule    NEURONTIN    270 capsule    Take 1 capsule (100 mg) by mouth 3 times daily    Idiopathic peripheral neuropathy       GLUCOSAMINE CHONDR 1500 COMPLX PO      Take by mouth daily        LIPITOR 40 MG tablet   Generic drug:  atorvastatin      Take 40 mg by mouth daily        lisinopril 2.5 MG tablet    PRINIVIL/Zestril     Take 5 mg by mouth daily.        Magnesium Oxide 250 MG Tabs      Take by mouth daily        metoprolol 25 MG 24 hr tablet    TOPROL-XL     Take 25 mg by mouth daily 2 tablets daily        MULTIVITAMIN PO      Take  by mouth.        * omeprazole 40 MG capsule    priLOSEC     Take 1 capsule by mouth daily        * omeprazole 20 MG CR capsule    priLOSEC    180 capsule    Take 1-2 capsules (20-40 mg) by mouth daily    Long-term (current) use of anticoagulants, Gastroesophageal reflux disease without esophagitis       PLAVIX 75 MG tablet   Generic drug:  clopidogrel      Take 1 tablet by mouth daily        potassium chloride 20 MEQ Packet    KLOR-CON     Take 20 mEq by mouth 2 times daily        sildenafil 50 MG tablet    VIAGRA    30 tablet    Take 1 tablet by mouth See Admin Instructions.    Routine physical examination       timolol 0.5 % ophthalmic solution    TIMOPTIC     Place 1 drop into the right eye daily        vitamin D 1000 UNITS capsule      Take 1 capsule by mouth daily        warfarin 5 MG tablet    COUMADIN    72 tablet    Take a half tablet Wed and Sat and one tablet Sun, Mon, Tu, Th, Fri or as directed by INR Clinic    Chronic atrial fibrillation (H)       XALATAN OP      Apply 1 drop to eye daily        *  Notice:  This list has 2 medication(s) that are the same as other medications prescribed for you. Read the directions carefully, and ask your doctor or other care provider to review them with you.

## 2018-01-23 ENCOUNTER — ANTICOAGULATION THERAPY VISIT (OUTPATIENT)
Dept: NURSING | Facility: CLINIC | Age: 78
End: 2018-01-23
Payer: COMMERCIAL

## 2018-01-23 ENCOUNTER — OFFICE VISIT (OUTPATIENT)
Dept: PEDIATRICS | Facility: CLINIC | Age: 78
End: 2018-01-23
Payer: COMMERCIAL

## 2018-01-23 VITALS
BODY MASS INDEX: 28.31 KG/M2 | OXYGEN SATURATION: 97 % | HEART RATE: 64 BPM | WEIGHT: 203 LBS | TEMPERATURE: 97.4 F | DIASTOLIC BLOOD PRESSURE: 50 MMHG | SYSTOLIC BLOOD PRESSURE: 92 MMHG

## 2018-01-23 DIAGNOSIS — Z01.818 PREOP GENERAL PHYSICAL EXAM: Primary | ICD-10-CM

## 2018-01-23 DIAGNOSIS — I10 ESSENTIAL HYPERTENSION, BENIGN: ICD-10-CM

## 2018-01-23 DIAGNOSIS — Z95.810 AICD (AUTOMATIC CARDIOVERTER/DEFIBRILLATOR) PRESENT: ICD-10-CM

## 2018-01-23 DIAGNOSIS — Z79.01 LONG-TERM (CURRENT) USE OF ANTICOAGULANTS: ICD-10-CM

## 2018-01-23 DIAGNOSIS — I50.22 CHRONIC SYSTOLIC CONGESTIVE HEART FAILURE (H): ICD-10-CM

## 2018-01-23 DIAGNOSIS — H25.9 SENILE CATARACT OF LEFT EYE, UNSPECIFIED AGE-RELATED CATARACT TYPE: ICD-10-CM

## 2018-01-23 DIAGNOSIS — I48.20 CHRONIC ATRIAL FIBRILLATION (H): ICD-10-CM

## 2018-01-23 LAB — INR POINT OF CARE: 2.8 (ref 0.86–1.14)

## 2018-01-23 PROCEDURE — 85610 PROTHROMBIN TIME: CPT | Mod: QW

## 2018-01-23 PROCEDURE — 80048 BASIC METABOLIC PNL TOTAL CA: CPT | Performed by: INTERNAL MEDICINE

## 2018-01-23 PROCEDURE — 36416 COLLJ CAPILLARY BLOOD SPEC: CPT

## 2018-01-23 PROCEDURE — 99214 OFFICE O/P EST MOD 30 MIN: CPT | Performed by: INTERNAL MEDICINE

## 2018-01-23 PROCEDURE — 99207 ZZC NO CHARGE NURSE ONLY: CPT

## 2018-01-23 NOTE — PROGRESS NOTES
ANTICOAGULATION FOLLOW-UP CLINIC VISIT    Patient Name:  Malcolm Barker  Date:  1/23/2018  Contact Type:  Face to Face    SUBJECTIVE:     Patient Findings     Positives Dental/Other procedures    Comments He is having cataract surgery on 1/30/18.  Does not need to hold warfarin.           OBJECTIVE    INR Protime   Date Value Ref Range Status   01/23/2018 2.8 (A) 0.86 - 1.14 Final       ASSESSMENT / PLAN  INR assessment THER    Recheck INR In: 6 WEEKS    INR Location Clinic      Anticoagulation Summary as of 1/23/2018     INR goal 2.0-3.0   Today's INR 2.8   Maintenance plan 2.5 mg (5 mg x 0.5) on Wed, Sat; 5 mg (5 mg x 1) all other days   Full instructions 2.5 mg on Wed, Sat; 5 mg all other days   Weekly total 30 mg   No change documented Vianca Kent, RN   Plan last modified Mandi Phoenix RN (6/10/2016)   Next INR check 3/6/2018   Priority INR   Target end date Indefinite    Indications   Long-term (current) use of anticoagulants [Z79.01] [Z79.01]  Chronic atrial fibrillation (H) [I48.2]         Anticoagulation Episode Summary     INR check location     Preferred lab     Send INR reminders to [facility]    Comments 5mg tabs // APPT CARD      Anticoagulation Care Providers     Provider Role Specialty Phone number    Tod Callahan MD  Internal Medicine [phone]            See the Encounter Report to view Anticoagulation Flowsheet and Dosing Calendar (Go to Encounters tab in chart review, and find the Anticoagulation Therapy Visit)        Vianca Kent RN

## 2018-01-23 NOTE — PROGRESS NOTES
[facility]  [address]  [address]  [facility] [address] [phone]    PRE-OP EVALUATION:  Today's date: 2018    Malcolm Barker (: 1940) presents for pre-operative evaluation assessment as requested by Dr. Bradley.  He requires evaluation and anesthesia risk assessment prior to undergoing surgery/procedure for treatment of left eye cataract.  Proposed procedure: Cataract surgery.     Date of Surgery/ Procedure: 18  Time of Surgery/ Procedure: [facility]  Hospital/Surgical Facility: [facility]  Fax number for surgical facility: [phone]  Primary Physician: Tod Callahan  Type of Anesthesia Anticipated: to be determined    Patient has a Health Care Directive or Living Will:  YES no on file.     Preop Questions 2018   1.  Do you have a history of heart attack, stroke, stent, bypass or surgery on an artery in the head, neck, heart or legs? YES - Pt has had heart attack in    2.  Do you ever have any pain or discomfort in your chest? No   3.  Do you have a history of  Heart Failure? YES - heart attack   4.   Are you troubled by shortness of breath when:  walking on a level surface, or up a slight hill, or at night? No   5.  Do you currently have a cold, bronchitis or other respiratory infection? No   6.  Do you have a cough, shortness of breath, or wheezing? No   7.  Do you sometimes get pains in the calves of your legs when you walk? No   8. Do you or anyone in your family have previous history of blood clots? No   9.  Do you or does anyone in your family have a serious bleeding problem such as prolonged bleeding following surgeries or cuts? No   10. Have you ever had problems with anemia or been told to take iron pills? No   11. Have you had any abnormal blood loss such as black, tarry or bloody stools? No   12. Have you ever had a blood transfusion? YES -     13. Have you or any of your relatives ever had problems with  anesthesia? No   14. Do you have sleep apnea, excessive snoring or daytime drowsiness? No   15. Do you have any prosthetic heart valves? No   16. Do you have prosthetic joints? No           HPI:                                                      Brief HPI related to upcoming procedure: Left eye cataract. Plan for surgical removal.    Cardiac history - hx of CHF, MI in the past, A-fib and HTN. Has AICD/pacemaker in place. Recently checked and adjustments were made. Reviewed medications. No cardiac sx such as CP, palpitations, PND, orthopnea, ALFARO.    MEDICAL HISTORY:                                                    Patient Active Problem List    Diagnosis Date Noted     Idiopathic progressive neuropathy 04/05/2017     Long-term (current) use of anticoagulants [Z79.01] 04/18/2016     Chronic atrial fibrillation (H) 04/15/2016     AICD (automatic cardioverter/defibrillator) present 02/07/2014     Chronic systolic congestive heart failure (H) 10/07/2013     Coronary artery disease involving native coronary artery of native heart without angina pectoris 10/07/2013     Hypertrophy of prostate with urinary obstruction 03/14/2006     Essential hypertension, benign 10/29/2002     Generalized osteoarthrosis, unspecified site 10/29/2002     Esophageal reflux 10/29/2002      Past Medical History:   Diagnosis Date     Contracture of palmar fascia     both hands, mild     Esophageal reflux      Essential hypertension, benign      Generalized osteoarthrosis, unspecified site     L shoulder, r hip..  improved now with exercise, glucosamine     Glaucoma      Hypertrophy (benign) of prostate     mild slowing     Past Surgical History:   Procedure Laterality Date     C NONSPECIFIC PROCEDURE  5/04    colonoscopy      C NONSPECIFIC PROCEDURE  1993    R hernia     [facility]     COL[facility]OSCOPY  4/24/2013    Procedure: COLONOSCOPY;  Colonoscopy        ENT SURGERY      T&A as a child     EYE SURGERY       lright eye lens replacement     IMPLANT AUTOMATIC IMPLANTABLE CARDIOVERTER DEFIBRILLATOR  2/2014     Current Outpatient Prescriptions   Medication Sig Dispense Refill     ranitidine (ZANTAC) 150 MG tablet Take 1 tablet (150 mg) by mouth 2 times daily 60 tablet 1     albuterol (PROAIR HFA/PROVENTIL HFA/VENTOLIN HFA) 108 (90 BASE) MCG/ACT Inhaler Inhale 2 puffs into the lungs every 4 hours as needed for wheezing 1 Inhaler 1     gabapentin (NEURONTIN) 100 MG capsule Take 1 capsule (100 mg) by mouth 3 times daily 270 capsule 1     warfarin (COUMADIN) 5 MG tablet Take a half tablet Wed and Sat and one tablet Sun, Mon, Tu, Th, Fri or as directed by INR Clinic 72 tablet 1     timolol (TIMOPTIC) 0.5 % ophthalmic solution Place 1 drop into the right eye daily       Coenzyme Q10 (COQ10) 200 MG CAPS Take by mouth daily       Glucosamine-Chondroit-Vit C-Mn (GLUCOSAMINE CHONDR 1500 COMPLX PO) Take by mouth daily       Magnesium Oxide 250 MG TABS Take by mouth daily       metoprolol (TOPROL-XL) 25 MG 24 hr tablet Take 25 mg by mouth daily 2 tablets daily       bumetanide (BUMEX) 1 MG tablet Take 1 mg by mouth daily 2 in PM       carvedilol (COREG) 3.125 MG tablet Take 3.125 mg by mouth 2 times daily (with meals)       fluticasone (FLONASE) 50 MCG/ACT nasal spray Spray 1-2 sprays into both nostrils daily 1 Package 11     potassium chloride (KLOR-CON) 20 MEQ packet Take 20 mEq by mouth 2 times daily       lisinopril (PRINIVIL,ZESTRIL) 2.5 MG tablet Take 5 mg by mouth daily.        clopidogrel (PLAVIX) 75 MG tablet Take 1 tablet by mouth daily       atorvastatin (LIPITOR) 40 MG tablet Take 40 mg by mouth daily       Cholecalciferol (VITAMIN D) 1000 UNITS capsule Take 1 capsule by mouth daily       Latanoprost (XALATAN OP) Apply 1 drop to eye daily       sildenafil (VIAGRA) 50 MG tablet Take 1 tablet by mouth See Admin Instructions. 30 tablet 3     Multiple Vitamin (MULTIVITAMIN OR) Take  by mouth.         Allergies   Allergen  Reactions     No Known Allergies       Latex Allergy: NO    Social History   Substance Use Topics     Smoking status: Former Smoker     Years: 3.00     Smokeless tobacco: Never Used      Comment: quit approx 1999     Alcohol use Yes      Comment: 2 drinks daily     History   Drug Use No       REVIEW OF SYSTEMS:                                                    Constitutional, HEENT, cardiovascular, pulmonary, gi and gu systems are negative, except as otherwise noted.      EXAM:                                                    BP 92/50 (Cuff Size: Adult Large)  Pulse 64  Temp 97.4  F (36.3  C) (Oral)  Wt 203 lb (92.1 kg)  SpO2 97%  BMI 28.31 kg/m2    GENERAL APPEARANCE: healthy, alert and no distress     EYES: EOMI,  PERRL     HENT: ear canals and TM's normal and nose and mouth without ulcers or lesions     NECK: no adenopathy, no asymmetry, masses, or scars and thyroid normal to palpation     RESP: lungs clear to auscultation - no rales, rhonchi or wheezes     CV: regular rates and rhythm, normal S1 S2, no S3 or S4 and no murmur, click or rub     ABDOMEN:  soft, nontender, no HSM or masses and bowel sounds normal     MS: extremities normal- no gross deformities noted, no evidence of inflammation in joints, FROM in all extremities.     SKIN: no suspicious lesions or rashes     NEURO: Normal strength and tone, sensory exam grossly normal, mentation intact and speech normal     LYMPHATICS: No axillary, cervical, or supraclavicular nodes    DIAGNOSTICS:                                                    No EKG required for low risk surgery     Results for orders placed or performed in visit on 01/23/18   Basic metabolic panel   Result Value Ref Range    Sodium 138 133 - 144 mmol/L    Potassium 4.5 3.4 - 5.3 mmol/L    Chloride 103 94 - 109 mmol/L    Carbon Dioxide 32 20 - 32 mmol/L    Anion Gap 3 3 - 14 mmol/L    Glucose 88 70 - 99 mg/dL    Urea Nitrogen 25 7 - 30 mg/dL    Creatinine 1.24 0.66 - 1.25 mg/dL    GFR  Estimate 57 (L) >60 mL/min/1.7m2    GFR Estimate If Black 68 >60 mL/min/1.7m2    Calcium 8.5 8.5 - 10.1 mg/dL       Recent Labs   Lab Test 01/08/18 12/18/17 04/05/17   0851  07/25/16 03/29/16   0828   06/07/11   0906   HGB   --    --    --   15.0   --    --    --   14.6   < >   --    PLT   --    --    --   218   --    --    --   217   < >   --    INR  2.5*  2.0   < >   --    < >  2.2   < >   --    --    --    NA   --    --    --   141   --    --    --   137   < >   --    POTASSIUM   --    --    --   5.0   --   4.5   --   4.3   < >   --    CR   --    --    --   1.29*   --   1.18   --   1.20   < >   --    A1C   --    --    --   5.6   --    --    --    --    --   5.5    < > = values in this interval not displayed.        IMPRESSION:                                                    Reason for surgery/procedure: Cataract    The proposed surgical procedure is considered LOW risk.    REVISED CARDIAC RISK INDEX  The patient has the following serious cardiovascular risks for perioperative complications such as (MI, PE, VFib and 3  AV Block):  No serious cardiac risks      ICD-10-CM    1. Preop general physical exam Z01.818    2. Senile cataract of left eye, unspecified age-related cataract type H25.9    3. Essential hypertension, benign I10    4. Chronic systolic congestive heart failure (H) I50.22    5. AICD (automatic cardioverter/defibrillator) present Z95.810    6. Chronic atrial fibrillation (H) I48.2    Overall cardiac status is stable.     RECOMMENDATIONS:                                                      APPROVAL GIVEN to proceed with proposed procedure, without further diagnostic evaluation     Patient Instructions     Call your surgeon if there is any change in your health. This includes signs of a cold or flu (such as a sore throat, runny nose, cough, rash or fever).    Do not smoke, drink alcohol or take over the counter medicine (unless your surgeon or primary care doctor tells you to) for the 24 hours  before and after surgery.    Take your prescribed medications through the time of your surgery.     Eating and drinking prior to surgery: follow the instructions from your surgeon    Signed Electronically by: Tod Callahan MD    A copy of this evaluation report is provided to the requesting physician.

## 2018-01-23 NOTE — NURSING NOTE
"Chief Complaint   Patient presents with     Pre-Op Exam       Initial BP (!) 82/50 (Cuff Size: Adult Large)  Pulse 64  Temp 97.4  F (36.3  C) (Oral)  Wt 203 lb (92.1 kg)  SpO2 97%  BMI 28.31 kg/m2 Estimated body mass index is 28.31 kg/(m^2) as calculated from the following:    Height as of 10/30/17: 5' 11\" (1.803 m).    Weight as of this encounter: 203 lb (92.1 kg).  Medication Reconciliation: complete    Jazlyn Rg   "

## 2018-01-23 NOTE — MR AVS SNAPSHOT
After Visit Summary   1/23/2018    [facility]    [facility]: [id number]           Patient Information     Date Of Birth          1940        Visit Information        Provider Department      1/23/2018 11:00 AM Tod Callahan MD [facility]        Today's Diagnoses     Preop general physical exam    -  1    Senile cataract of left eye, unspecified age-related cataract type        Essential hypertension, benign        Chronic systolic congestive heart failure (H)        AICD (automatic cardioverter/defibrillator) present        Chronic atrial fibrillation (H)          Care Instructions      Call your surgeon if there is any change in your health. This includes signs of a cold or flu (such as a sore throat, runny nose, cough, rash or fever).    Do not smoke, drink alcohol or take over the counter medicine (unless your surgeon or primary care doctor tells you to) for the 24 hours before and after surgery.    Take your prescribed medications through the time of your surgery.     Eating and drinking prior to surgery: follow the instructions from your surgeon          Follow-ups after your visit        Your next 10 appointments already scheduled     Mar 06, 2018 11:30 AM CST   Anticoagulation Visit with [facility]   [facility] ([facility])    [address] [place] [address] [address]  [facility] [id number] [phone]            Mar 30, 2018  8:10 AM CDT   PHYSICAL with Tod Callahan MD   [facility] ([facility])    [address] [place] [address] [address]  [facility] [address] [phone]              Who to contact     If you have questions or need follow up information about today's clinic visit or your schedule please contact [facility] directly at [phone].  Normal or non-critical lab and imaging results will be communicated to you by MyChart, letter or phone within 4 business days after the clinic  has received the results. If you do not hear from us within 7 days, please contact the clinic through [employer] or phone. If you have a critical or abnormal lab result, we will notify you by phone as soon as possible.  Submit refill requests through [employer] or call your pharmacy and they will forward the refill request to us. Please allow 3 business days for your refill to be completed.          Additional Information About Your Visit        [email or web address] Information     [employer] gives you secure access to your electronic health record. If you see a primary care provider, you can also send messages to your care team and make appointments. If you have questions, please call your primary care clinic.  If you do not have a primary care provider, please call [phone] and they will assist you.        Care EveryWhere ID     This is your Care EveryWhere ID. This could be used by other organizations to access your [facility] medical records  [id number]        Your Vitals Were     Pulse Temperature Pulse Oximetry BMI (Body Mass Index)          64 97.4  F (36.3  C) (Oral) 97% 28.31 kg/m2         Blood Pressure from Last 3 Encounters:   01/23/18 92/50   01/08/18 124/80   10/30/17 (!) 80/50    Weight from Last 3 Encounters:   01/23/18 203 lb (92.1 kg)   01/08/18 206 lb (93.4 kg)   10/30/17 205 lb (93 kg)              We Performed the Following     Basic metabolic panel        Primary Care Provider Office Phone # Fax #    Tod Callahan [phone] [address] [place] [address]        Equal Access to Services     [facility]: Hadii aad ku hadasho Soomaali, waaxda luqadaha, qaybta kaalmada yonathan, dexter castro . So [facility] [phone].    ATENCIÓN: Si habla español, tiene a espinosa disposición servicios gratuitos de asistencia lingüística. Llame al [phone].    We comply with applicable federal civil rights laws and [place] laws. We do not discriminate on the basis  of race, color, national origin, age, disability, sex, sexual orientation, or gender identity.            Thank you!     Thank you for choosing [facility]  for your care. Our goal is always to provide you with excellent care. Hearing back from our patients is one way we can continue to improve our services. Please take a few minutes to complete the written survey that you may receive in the mail after your visit with us. Thank you!             Your Updated Medication List - Protect others around you: Learn how to safely use, store and throw away your medicines at www.disposemymeds.org.          This list is accurate as of 1/23/18 11:59 PM.  Always use your most recent med list.                   Brand Name Dispense Instructions for use Diagnosis    albuterol 108 (90 BASE) MCG/ACT Inhaler    PROAIR HFA/PROVENTIL HFA/VENTOLIN HFA    1 Inhaler    Inhale 2 puffs into the lungs every 4 hours as needed for wheezing    Acute bronchitis, unspecified organism, Wheezing       bumetanide 1 MG tablet    BUMEX     Take 1 mg by mouth daily 2 in PM        CoQ10 200 MG Caps      Take by mouth daily        COREG 3.125 MG tablet   Generic drug:  carvedilol      Take 3.125 mg by mouth 2 times daily (with meals)        fluticasone 50 MCG/ACT spray    FLONASE    1 Package    Spray 1-2 sprays into both nostrils daily    Postnasal discharge       gabapentin 100 MG capsule    NEURONTIN    270 capsule    Take 1 capsule (100 mg) by mouth 3 times daily    Idiopathic peripheral neuropathy       GLUCOSAMINE CHONDR 1500 COMPLX PO      Take by mouth daily        LIPITOR 40 MG tablet   Generic drug:  atorvastatin      Take 40 mg by mouth daily        lisinopril 2.5 MG tablet    PRINIVIL/Zestril     Take 5 mg by mouth daily.        Magnesium Oxide 250 MG Tabs      Take by mouth daily        metoprolol succinate 25 MG 24 hr tablet    TOPROL-XL     Take 25 mg by mouth daily 2 tablets daily        MULTIVITAMIN PO      Take  by mouth.         PLAVIX 75 MG tablet   Generic drug:  clopidogrel      Take 1 tablet by mouth daily        potassium chloride 20 MEQ Packet    KLOR-CON     Take 20 mEq by mouth 2 times daily        ranitidine 150 MG tablet    ZANTAC    60 tablet    Take 1 tablet (150 mg) by mouth 2 times daily        sildenafil 50 MG tablet    VIAGRA    30 tablet    Take 1 tablet by mouth See Admin Instructions.    Routine physical examination       timolol 0.5 % ophthalmic solution    TIMOPTIC     Place 1 drop into the right eye daily        vitamin D 1000 UNITS capsule      Take 1 capsule by mouth daily        warfarin 5 MG tablet    COUMADIN    72 tablet    Take a half tablet Wed and Sat and one tablet Sun, Mon, Tu, Th, Fri or as directed by INR Clinic    Chronic atrial fibrillation (H)       XALATAN OP      Apply 1 drop to eye daily

## 2018-01-23 NOTE — PATIENT INSTRUCTIONS
Call your surgeon if there is any change in your health. This includes signs of a cold or flu (such as a sore throat, runny nose, cough, rash or fever).    Do not smoke, drink alcohol or take over the counter medicine (unless your surgeon or primary care doctor tells you to) for the 24 hours before and after surgery.    Take your prescribed medications through the time of your surgery.     Eating and drinking prior to surgery: follow the instructions from your surgeon

## 2018-01-23 NOTE — MR AVS SNAPSHOT
Malcolm HENDERSON Juan Manuelcarlostin   1/23/2018 10:15 AM   Anticoagulation Therapy Visit    Description:  77 year old male   Provider:  [facility]   Department:   Nurse           INR as of 1/23/2018     Today's INR 2.8      Anticoagulation Summary as of 1/23/2018     INR goal 2.0-3.0   Today's INR 2.8   Full instructions 2.5 mg on Wed, Sat; 5 mg all other days   Next INR check 3/6/2018    Indications   Long-term (current) use of anticoagulants [Z79.01] [Z79.01]  Chronic atrial fibrillation (H) [I48.2]         Your next Anticoagulation Clinic appointment(s)     Mar 06, 2018 11:30 AM CST   Anticoagulation Visit with  [facility]AGULATION CLINIC   [facility]    [address]  [address]  [facility] [address] [phone]              Contact Numbers     [facility]  Please call  [phone] to cancel and/or reschedule your appointment   Please call  [phone] with any problems or questions regarding your therapy.        January 2018 Details    Sun Mon Tue Wed Thu Fri Sat      1               2               3               4               5               6                 7               8               9               10               11               12               13                 14               15               16               17               18               19               20                 21               22               23      5 mg   See details      24      2.5 mg         25      5 mg         26      5 mg         27      2.5 mg           28      5 mg         29      5 mg         30      5 mg         31      2.5 mg             Date Details   01/23 This INR check               How to take your warfarin dose     To take:  2.5 mg Take 0.5 of a 5 mg tablet.    To take:  5 mg Take 1 of the 5 mg tablets.           February 2018 Details    Sun Mon Tue Wed Thu Fri Sat         1      5 mg         2      5 mg         3      2.5 mg           4       5 mg         5      5 mg         6      5 mg         7      2.5 mg         8      5 mg         9      5 mg         10      2.5 mg           11      5 mg         12      5 mg         13      5 mg         14      2.5 mg         15      5 mg         16      5 mg         17      2.5 mg           18      5 mg         19      5 mg         20      5 mg         21      2.5 mg         22      5 mg         23      5 mg         24      2.5 mg           25      5 mg         26      5 mg         27      5 mg         28      2.5 mg             Date Details   No additional details            How to take your warfarin dose     To take:  2.5 mg Take 0.5 of a 5 mg tablet.    To take:  5 mg Take 1 of the 5 mg tablets.           March 2018 Details    Sun Mon Tue Wed Thu Fri Sat         1      5 mg         2      5 mg         3      2.5 mg           4      5 mg         5      5 mg         6            7               8               9               10                 11               12               13               14               15               16               17                 18               19               20               21               22               23               24                 25               26               27               28               29               30               31                Date Details   No additional details    Date of next INR:  3/6/2018         How to take your warfarin dose     To take:  2.5 mg Take 0.5 of a 5 mg tablet.    To take:  5 mg Take 1 of the 5 mg tablets.

## 2018-01-24 LAB
ANION GAP SERPL CALCULATED.3IONS-SCNC: 3 MMOL/L (ref 3–14)
BUN SERPL-MCNC: 25 MG/DL (ref 7–30)
CALCIUM SERPL-MCNC: 8.5 MG/DL (ref 8.5–10.1)
CHLORIDE SERPL-SCNC: 103 MMOL/L (ref 94–109)
CO2 SERPL-SCNC: 32 MMOL/L (ref 20–32)
CREAT SERPL-MCNC: 1.24 MG/DL (ref 0.66–1.25)
GFR SERPL CREATININE-BSD FRML MDRD: 57 ML/MIN/1.7M2
GLUCOSE SERPL-MCNC: 88 MG/DL (ref 70–99)
POTASSIUM SERPL-SCNC: 4.5 MMOL/L (ref 3.4–5.3)
SODIUM SERPL-SCNC: 138 MMOL/L (ref 133–144)

## 2018-03-05 ENCOUNTER — ANTICOAGULATION THERAPY VISIT (OUTPATIENT)
Dept: NURSING | Facility: CLINIC | Age: 78
End: 2018-03-05
Payer: COMMERCIAL

## 2018-03-05 DIAGNOSIS — I48.20 CHRONIC ATRIAL FIBRILLATION (H): ICD-10-CM

## 2018-03-05 DIAGNOSIS — Z79.01 LONG-TERM (CURRENT) USE OF ANTICOAGULANTS: ICD-10-CM

## 2018-03-05 LAB — INR POINT OF CARE: 2 (ref 0.86–1.14)

## 2018-03-05 PROCEDURE — 85610 PROTHROMBIN TIME: CPT | Mod: QW

## 2018-03-05 PROCEDURE — 36416 COLLJ CAPILLARY BLOOD SPEC: CPT

## 2018-03-05 PROCEDURE — 99207 ZZC NO CHARGE NURSE ONLY: CPT

## 2018-03-05 NOTE — MR AVS SNAPSHOT
Malcolm Barker   3/5/2018 1:30 PM   Anticoagulation Therapy Visit    Description:  77 year old male   Provider:  [facility]   Department:   Nurse           INR as of 3/5/2018     Today's INR 2.0      Anticoagulation Summary as of 3/5/2018     INR goal 2.0-3.0   Today's INR 2.0   Full instructions 2.5 mg on Wed, Sat; 5 mg all other days   Next INR check 3/19/2018    Indications   Long-term (current) use of anticoagulants [Z79.01] [Z79.01]  Chronic atrial fibrillation (H) [I48.2]         Your next Anticoagulation Clinic appointment(s)     Mar 19, 2018  1:30 PM CDT   Anticoagulation Visit with  ANTICOAGULATION CLINIC   [facility]    [address]  [address]  [facility] [address] [phone]              Contact Numbers     [facility]  Please call  [phone] to cancel and/or reschedule your appointment   Please call  [phone] with any problems or questions regarding your therapy.        March 2018 Details    Sun Mon Tue Wed Thu Fri Sat         1               2               3                 4               5      5 mg   See details      6      5 mg         7      2.5 mg         8      5 mg         9      5 mg         10      2.5 mg           11      5 mg         12      5 mg         13      5 mg         14      2.5 mg         15      5 mg         16      5 mg         17      2.5 mg           18      5 mg         19            20               21               22               23               24                 25               26               27               28               29               30               31                Date Details   03/05 This INR check       Date of next INR:  3/19/2018         How to take your warfarin dose     To take:  2.5 mg Take 0.5 of a 5 mg tablet.    To take:  5 mg Take 1 of the 5 mg tablets.

## 2018-03-19 ENCOUNTER — ANTICOAGULATION THERAPY VISIT (OUTPATIENT)
Dept: NURSING | Facility: CLINIC | Age: 78
End: 2018-03-19
Payer: COMMERCIAL

## 2018-03-19 DIAGNOSIS — I48.20 CHRONIC ATRIAL FIBRILLATION (H): ICD-10-CM

## 2018-03-19 DIAGNOSIS — Z79.01 LONG-TERM (CURRENT) USE OF ANTICOAGULANTS: ICD-10-CM

## 2018-03-19 LAB — INR POINT OF CARE: 2.5 (ref 0.86–1.14)

## 2018-03-19 PROCEDURE — 85610 PROTHROMBIN TIME: CPT | Mod: QW

## 2018-03-19 PROCEDURE — 36416 COLLJ CAPILLARY BLOOD SPEC: CPT

## 2018-03-19 PROCEDURE — 99207 ZZC NO CHARGE NURSE ONLY: CPT

## 2018-03-19 NOTE — PROGRESS NOTES
ANTICOAGULATION FOLLOW-UP CLINIC VISIT    Patient Name:  Malcolm Barker  Date:  3/19/2018  Contact Type:  Face to Face    SUBJECTIVE:     Patient Findings     Positives No Problem Findings    Comments Denies bleeding/bruising or missed dose.             OBJECTIVE    INR Protime   Date Value Ref Range Status   03/19/2018 2.5 (A) 0.86 - 1.14 Final       ASSESSMENT / PLAN  INR assessment THER    Recheck INR In: 6 WEEKS    INR Location Clinic      Anticoagulation Summary as of 3/19/2018     INR goal 2.0-3.0   Today's INR 2.5   Maintenance plan 2.5 mg (5 mg x 0.5) on Wed, Sat; 5 mg (5 mg x 1) all other days   Full instructions 2.5 mg on Wed, Sat; 5 mg all other days   Weekly total 30 mg   No change documented Yudy Plunkett RN   Plan last modified Mandi Phoenix RN (6/10/2016)   Next INR check 4/30/2018   Priority INR   Target end date Indefinite    Indications   Long-term (current) use of anticoagulants [Z79.01] [Z79.01]  Chronic atrial fibrillation (H) [I48.2]         Anticoagulation Episode Summary     INR check location     Preferred lab     Send INR reminders to [facility]    Comments 5mg tabs // APPT CARD      Anticoagulation Care Providers     Provider Role Specialty Phone number    Tod Callahan MD  Internal Medicine [phone]            See the Encounter Report to view Anticoagulation Flowsheet and Dosing Calendar (Go to Encounters tab in chart review, and find the Anticoagulation Therapy Visit)    Yudy Plunkett RN

## 2018-03-19 NOTE — MR AVS SNAPSHOT
Malcolm HENDERSON Juan Manuelcarlostin   3/19/2018 1:30 PM   Anticoagulation Therapy Visit    Description:  78 year old male   Provider:  [facility]   Department:  [facility]           INR as of 3/19/2018     Today's INR 2.5      Anticoagulation Summary as of 3/19/2018     INR goal 2.0-3.0   Today's INR 2.5   Full instructions 2.5 mg on Wed, Sat; 5 mg all other days   Next INR check 4/30/2018    Indications   Long-term (current) use of anticoagulants [Z79.01] [Z79.01]  Chronic atrial fibrillation (H) [I48.2]         Your next Anticoagulation Clinic appointment(s)     Apr 30, 2018  1:30 PM CDT   Anticoagulation Visit with  ANTICOAGULATION CLINIC   [facility]    [address] [place] [address]  [address]  [facility] [address] [phone]              Contact Numbers     [facility]  Please call  [phone] to cancel and/or reschedule your appointment   Please call  [phone] with any problems or questions regarding your therapy.        March 2018 Details    Sun Mon Tue Wed Thu Fri Sat         1               2               3                 4               5               6               7               8               9               10                 11               12               13               14               15               16               17                 18               19      5 mg   See details      20      5 mg         21      2.5 mg         22      5 mg         23      5 mg         24      2.5 mg           25      5 mg         26      5 mg         27      5 mg         28      2.5 mg         29      5 mg         30      5 mg         31      2.5 mg          Date Details   03/19 This INR check               How to take your warfarin dose     To take:  2.5 mg Take 0.5 of a 5 mg tablet.    To take:  5 mg Take 1 of the 5 mg tablets.           April 2018 Details    Sun Mon Tue Wed Thu Fri Sat     1      5 mg         2      5 mg         3      5 mg          4      2.5 mg         5      5 mg         6      5 mg         7      2.5 mg           8      5 mg         9      5 mg         10      5 mg         11      2.5 mg         12      5 mg         13      5 mg         14      2.5 mg           15      5 mg         16      5 mg         17      5 mg         18      2.5 mg         19      5 mg         20      5 mg         21      2.5 mg           22      5 mg         23      5 mg         24      5 mg         25      2.5 mg         26      5 mg         27      5 mg         28      2.5 mg           29      5 mg         30                  Date Details   No additional details    Date of next INR:  4/30/2018         How to take your warfarin dose     To take:  2.5 mg Take 0.5 of a 5 mg tablet.    To take:  5 mg Take 1 of the 5 mg tablets.

## 2018-03-27 ENCOUNTER — TELEPHONE (OUTPATIENT)
Dept: PEDIATRICS | Facility: CLINIC | Age: 78
End: 2018-03-27

## 2018-03-27 DIAGNOSIS — E78.5 HYPERLIPIDEMIA WITH TARGET LDL LESS THAN 100: ICD-10-CM

## 2018-03-27 DIAGNOSIS — Z12.5 SPECIAL SCREENING FOR MALIGNANT NEOPLASM OF PROSTATE: ICD-10-CM

## 2018-03-27 DIAGNOSIS — I10 ESSENTIAL HYPERTENSION, BENIGN: Primary | ICD-10-CM

## 2018-03-27 DIAGNOSIS — I50.22 CHRONIC SYSTOLIC CONGESTIVE HEART FAILURE (H): ICD-10-CM

## 2018-03-27 DIAGNOSIS — I48.20 CHRONIC ATRIAL FIBRILLATION (H): ICD-10-CM

## 2018-03-27 RX ORDER — WARFARIN SODIUM 5 MG/1
TABLET ORAL
Qty: 80 TABLET | Refills: 4 | Status: SHIPPED | OUTPATIENT
Start: 2018-03-27 | End: 2018-07-09

## 2018-03-27 ASSESSMENT — ENCOUNTER SYMPTOMS
CHILLS: 0
NERVOUS/ANXIOUS: 0
MYALGIAS: 0
FEVER: 0
HEMATURIA: 0
ABDOMINAL PAIN: 0
NAUSEA: 0
FREQUENCY: 0
DIZZINESS: 0
COUGH: 0
HEADACHES: 0
ARTHRALGIAS: 0
JOINT SWELLING: 0
HEMATOCHEZIA: 0
DIARRHEA: 0
SORE THROAT: 0
PALPITATIONS: 0
SHORTNESS OF BREATH: 1
CONSTIPATION: 0
WEAKNESS: 0
DYSURIA: 0
EYE PAIN: 0
PARESTHESIAS: 0
HEARTBURN: 0

## 2018-03-27 ASSESSMENT — ACTIVITIES OF DAILY LIVING (ADL)
CURRENT_FUNCTION: NO ASSISTANCE NEEDED
I_NEED_ASSISTANCE_FOR_THE_FOLLOWING_DAILY_ACTIVITIES:: NO ASSISTANCE IS NEEDED

## 2018-03-27 NOTE — TELEPHONE ENCOUNTER
Patient requesting labs prior to physical on Friday. Pended same as last year except did not pend BMP or CMP because patient just had one in January. [phone] ok to dara.   Nany Moody RN

## 2018-03-28 DIAGNOSIS — I50.22 CHRONIC SYSTOLIC CONGESTIVE HEART FAILURE (H): ICD-10-CM

## 2018-03-28 DIAGNOSIS — Z12.5 SPECIAL SCREENING FOR MALIGNANT NEOPLASM OF PROSTATE: ICD-10-CM

## 2018-03-28 DIAGNOSIS — I10 ESSENTIAL HYPERTENSION, BENIGN: ICD-10-CM

## 2018-03-28 DIAGNOSIS — I48.20 CHRONIC ATRIAL FIBRILLATION (H): ICD-10-CM

## 2018-03-28 DIAGNOSIS — E78.5 HYPERLIPIDEMIA WITH TARGET LDL LESS THAN 100: ICD-10-CM

## 2018-03-28 LAB
ALBUMIN SERPL-MCNC: 4.1 G/DL (ref 3.4–5)
ALP SERPL-CCNC: 64 U/L (ref 40–150)
ALT SERPL W P-5'-P-CCNC: 27 U/L (ref 0–70)
ANION GAP SERPL CALCULATED.3IONS-SCNC: 5 MMOL/L (ref 3–14)
AST SERPL W P-5'-P-CCNC: 24 U/L (ref 0–45)
BILIRUB SERPL-MCNC: 0.8 MG/DL (ref 0.2–1.3)
BUN SERPL-MCNC: 22 MG/DL (ref 7–30)
CALCIUM SERPL-MCNC: 8.8 MG/DL (ref 8.5–10.1)
CHLORIDE SERPL-SCNC: 103 MMOL/L (ref 94–109)
CHOLEST SERPL-MCNC: 154 MG/DL
CO2 SERPL-SCNC: 31 MMOL/L (ref 20–32)
CREAT SERPL-MCNC: 1.24 MG/DL (ref 0.66–1.25)
ERYTHROCYTE [DISTWIDTH] IN BLOOD BY AUTOMATED COUNT: 13.1 % (ref 10–15)
GFR SERPL CREATININE-BSD FRML MDRD: 57 ML/MIN/1.7M2
GLUCOSE SERPL-MCNC: 100 MG/DL (ref 70–99)
HCT VFR BLD AUTO: 43.8 % (ref 40–53)
HDLC SERPL-MCNC: 50 MG/DL
HGB BLD-MCNC: 14.7 G/DL (ref 13.3–17.7)
LDLC SERPL CALC-MCNC: 80 MG/DL
MCH RBC QN AUTO: 33.6 PG (ref 26.5–33)
MCHC RBC AUTO-ENTMCNC: 33.6 G/DL (ref 31.5–36.5)
MCV RBC AUTO: 100 FL (ref 78–100)
NONHDLC SERPL-MCNC: 104 MG/DL
PLATELET # BLD AUTO: 198 10E9/L (ref 150–450)
POTASSIUM SERPL-SCNC: 4.3 MMOL/L (ref 3.4–5.3)
PROT SERPL-MCNC: 7.1 G/DL (ref 6.8–8.8)
PSA SERPL-ACNC: 4.03 UG/L (ref 0–4)
RBC # BLD AUTO: 4.38 10E12/L (ref 4.4–5.9)
SODIUM SERPL-SCNC: 139 MMOL/L (ref 133–144)
TRIGL SERPL-MCNC: 120 MG/DL
VIT B12 SERPL-MCNC: 572 PG/ML (ref 193–986)
WBC # BLD AUTO: 6.4 10E9/L (ref 4–11)

## 2018-03-28 PROCEDURE — 80053 COMPREHEN METABOLIC PANEL: CPT | Performed by: INTERNAL MEDICINE

## 2018-03-28 PROCEDURE — 82607 VITAMIN B-12: CPT | Performed by: INTERNAL MEDICINE

## 2018-03-28 PROCEDURE — 85027 COMPLETE CBC AUTOMATED: CPT | Performed by: INTERNAL MEDICINE

## 2018-03-28 PROCEDURE — G0103 PSA SCREENING: HCPCS | Performed by: INTERNAL MEDICINE

## 2018-03-28 PROCEDURE — 80061 LIPID PANEL: CPT | Performed by: INTERNAL MEDICINE

## 2018-03-28 PROCEDURE — 36415 COLL VENOUS BLD VENIPUNCTURE: CPT | Performed by: INTERNAL MEDICINE

## 2018-03-30 ENCOUNTER — OFFICE VISIT (OUTPATIENT)
Dept: PEDIATRICS | Facility: CLINIC | Age: 78
End: 2018-03-30
Payer: COMMERCIAL

## 2018-03-30 ENCOUNTER — TELEPHONE (OUTPATIENT)
Dept: PEDIATRICS | Facility: CLINIC | Age: 78
End: 2018-03-30

## 2018-03-30 VITALS
TEMPERATURE: 97.3 F | HEART RATE: 58 BPM | SYSTOLIC BLOOD PRESSURE: 112 MMHG | BODY MASS INDEX: 27.68 KG/M2 | OXYGEN SATURATION: 98 % | DIASTOLIC BLOOD PRESSURE: 63 MMHG | WEIGHT: 204.4 LBS | HEIGHT: 72 IN

## 2018-03-30 DIAGNOSIS — N52.9 ERECTILE DYSFUNCTION, UNSPECIFIED ERECTILE DYSFUNCTION TYPE: ICD-10-CM

## 2018-03-30 DIAGNOSIS — I48.20 CHRONIC ATRIAL FIBRILLATION (H): ICD-10-CM

## 2018-03-30 DIAGNOSIS — R97.20 ELEVATED PROSTATE SPECIFIC ANTIGEN (PSA): ICD-10-CM

## 2018-03-30 DIAGNOSIS — I50.22 CHRONIC SYSTOLIC CONGESTIVE HEART FAILURE (H): ICD-10-CM

## 2018-03-30 DIAGNOSIS — G60.9 IDIOPATHIC PERIPHERAL NEUROPATHY: ICD-10-CM

## 2018-03-30 DIAGNOSIS — I10 ESSENTIAL HYPERTENSION, BENIGN: ICD-10-CM

## 2018-03-30 DIAGNOSIS — Z00.00 ROUTINE GENERAL MEDICAL EXAMINATION AT A HEALTH CARE FACILITY: Primary | ICD-10-CM

## 2018-03-30 DIAGNOSIS — E78.5 HYPERLIPIDEMIA WITH TARGET LDL LESS THAN 100: ICD-10-CM

## 2018-03-30 PROCEDURE — G0439 PPPS, SUBSEQ VISIT: HCPCS | Performed by: INTERNAL MEDICINE

## 2018-03-30 RX ORDER — GABAPENTIN 100 MG/1
200 CAPSULE ORAL 2 TIMES DAILY
Qty: 360 CAPSULE | Refills: 3 | Status: SHIPPED | OUTPATIENT
Start: 2018-03-30 | End: 2018-11-09

## 2018-03-30 RX ORDER — SILDENAFIL CITRATE 20 MG/1
TABLET ORAL
Qty: 30 TABLET | Refills: 11 | Status: SHIPPED | OUTPATIENT
Start: 2018-03-30 | End: 2018-03-30

## 2018-03-30 RX ORDER — SILDENAFIL CITRATE 20 MG/1
TABLET ORAL
Qty: 30 TABLET | Refills: 11 | Status: SHIPPED | OUTPATIENT
Start: 2018-03-30 | End: 2020-11-02

## 2018-03-30 ASSESSMENT — ENCOUNTER SYMPTOMS
FREQUENCY: 0
DYSURIA: 0
CONSTIPATION: 0
HEMATURIA: 0
NERVOUS/ANXIOUS: 0
PALPITATIONS: 0
COUGH: 0
FEVER: 0
SHORTNESS OF BREATH: 1
DIZZINESS: 0
JOINT SWELLING: 0
PARESTHESIAS: 0
ARTHRALGIAS: 0
DIARRHEA: 0
HEARTBURN: 0
HEMATOCHEZIA: 0
SORE THROAT: 0
ABDOMINAL PAIN: 0
NAUSEA: 0
HEADACHES: 0
MYALGIAS: 0
CHILLS: 0
EYE PAIN: 0
WEAKNESS: 0

## 2018-03-30 ASSESSMENT — ACTIVITIES OF DAILY LIVING (ADL): CURRENT_FUNCTION: NO ASSISTANCE NEEDED

## 2018-03-30 NOTE — PATIENT INSTRUCTIONS
Preventive Health Recommendations:     Yearly exam:             See your health care provider every year in order to  o   Review health changes.   o   Discuss preventive care.    o   Review your medicines.    Every year, have a diabetes test (fasting glucose).     Every year, have a cholesterol test.   Shots:     Get a flu shot each year.     Get a tetanus shot every 10 years.     Talk to your pharmacist about the new shingles vaccine.   Nutrition:     Eat at least 5 servings of fruits and vegetables each day.     Eat whole-grain bread, whole-wheat pasta and brown rice instead of white grains and rice.     Get adequate Calcium and Vitamin D.   Lifestyle    Exercise for at least 150 minutes a week (30 minutes a day, 5 days a week). This will help you control your weight and prevent disease.     No smoking.     Wear sunscreen to prevent skin cancer.     See your dentist every six months for an exam and cleaning.     See your eye doctor every 1 to 2 years to screen for conditions such as glaucoma, macular degeneration and cataracts.

## 2018-03-30 NOTE — PROGRESS NOTES
SUBJECTIVE:   Malcolm Barker is a 78 year old male who presents for Preventive Visit.      Are you in the first 12 months of your Medicare coverage?  No    Physical   Annual:     Getting at least 3 servings of Calcium per day::  Yes    Bi-annual eye exam::  Yes    Dental care twice a year::  Yes    Sleep apnea or symptoms of sleep apnea::  None    Diet::  Low salt    Frequency of exercise::  4-5 days/week    Duration of exercise::  45-60 minutes    Taking medications regularly::  Yes    Medication side effects::  None    Additional concerns today::  YES    Ability to successfully perform activities of daily living: no assistance needed  Home Safety:  No safety concerns identified  Hearing Impairment: difficulty following a conversation in a noisy restaurant or crowded room and difficulty understanding soft or whispered speech      Fall risk:  Fallen 2 or more times in the past year?: No  Any fall with injury in the past year?: No    COGNITIVE SCREEN  1) Repeat 3 items (Banana, Sunrise, Chair)    2) Clock draw:   3) 3 item recall: Recalls 3 objects  Results: 3 items recalled: COGNITIVE IMPAIRMENT LESS LIKELY    Mini-CogTM Copyright JOB Pabon. Licensed by the author for use in [facility]; reprinted with permission ([email or web address]). All rights reserved.        Reviewed and updated as needed this visit by clinical staff  Tobacco  Allergies  Meds  Med Hx  Surg Hx  Fam Hx  Soc Hx        Reviewed and updated as needed this visit by Provider  Tobacco  Allergies  Meds  Problems  Med Hx  Surg Hx  Fam Hx  Soc Hx         Social History   Substance Use Topics     Smoking status: Former Smoker     Years: 3.00     Smokeless tobacco: Never Used      Comment: quit approx 1999     Alcohol use Yes      Comment: 2 drinks daily       Alcohol Use 3/27/2018   If you drink alcohol do you typically have greater than 3 drinks per day OR greater than 7 drinks per week? Yes   AUDIT SCORE  5   Elevated PSA.  Reviewed results. Overall climbing over the past 5+ years.  Hx of BPH. Discussed urology evaluation.     Neuropathy, feet. Shooting pains. Takes gabapentin - 100 mg 2 in the evening and 1 QHS. Some numbness in the fingers at times. Normal B12. Has undergone neurology evaluation w/o specific cause.     HTN, cardiomyopathy, hyperlipidemia, a-fib. Has pacemaker / ICD. No cardiac sx such as CP, palpitations, PND, orthopnea, ALFARO. He does have chronic peripheral edema, controlled w/ diuretics. Follows with cardiology.     ED. Uses sildenafil. In need of refill.     Today's PHQ-2 Score:   PHQ-2 ( 1999 Pfizer) 3/27/2018   Q1: Little interest or pleasure in doing things 0   Q2: Feeling down, depressed or hopeless 0   PHQ-2 Score 0   Q1: Little interest or pleasure in doing things Not at all   Q2: Feeling down, depressed or hopeless Not at all   PHQ-2 Score 0       Do you feel safe in your environment - Yes    Do you have a Health Care Directive?: Yes: Patient states has Advance Directive and will bring in a copy to clinic.    Current providers sharing in care for this patient include:   Patient Care Team:  Tod Callahan MD as PCP - General (Internal Medicine)    The following health maintenance items are reviewed in Epic and correct as of today:  Health Maintenance   Topic Date Due     ADVANCE DIRECTIVE PLANNING Q5 YRS  03/14/2016     HF ACTION PLAN Q3 YR  12/06/2016     FALL RISK ASSESSMENT  03/29/2017     OP ANNUAL INR REFERRAL  04/21/2017     INFLUENZA VACCINE (SYSTEM ASSIGNED)  09/01/2018     BMP Q6 MOS  09/28/2018     ALT Q1 YR  03/28/2019     LIPID MONITORING Q1 YEAR  03/28/2019     CBC Q1 YR  03/28/2019     TETANUS IMMUNIZATION (SYSTEM ASSIGNED)  03/18/2024     PNEUMOCOCCAL  Completed     Labs reviewed in EPIC        Review of Systems   Constitutional: Negative for chills and fever.   HENT: Negative for congestion, ear pain, hearing loss and sore throat.    Eyes: Negative for pain and visual disturbance.  "  Respiratory: Positive for shortness of breath. Negative for cough.    Cardiovascular: Negative for chest pain, palpitations and peripheral edema.   Gastrointestinal: Negative for abdominal pain, constipation, diarrhea, heartburn, hematochezia and nausea.   Genitourinary: Positive for impotence. Negative for discharge, dysuria, frequency, genital sores, hematuria and urgency.   Musculoskeletal: Negative for arthralgias, joint swelling and myalgias.   Skin: Negative for rash.   Neurological: Negative for dizziness, weakness, headaches and paresthesias.   Psychiatric/Behavioral: Negative for mood changes. The patient is not nervous/anxious.          OBJECTIVE:   /63 (BP Location: Right arm, Patient Position: Sitting, Cuff Size: Adult Regular)  Pulse 58  Temp 97.3  F (36.3  C) (Oral)  Ht 5' 11.75\" (1.822 m)  Wt 204 lb 6.4 oz (92.7 kg)  SpO2 98%  BMI 27.92 kg/m2 Estimated body mass index is 27.92 kg/(m^2) as calculated from the following:    Height as of this encounter: 5' 11.75\" (1.822 m).    Weight as of this encounter: 204 lb 6.4 oz (92.7 kg).  Physical Exam  GENERAL: healthy, alert and no distress  EYES: Eyes grossly normal to inspection, PERRL and conjunctivae and sclerae normal  HENT: ear canals and TM's normal, nose and mouth without ulcers or lesions  NECK: no adenopathy, no asymmetry, masses, or scars and thyroid normal to palpation  RESP: lungs clear to auscultation - no rales, rhonchi or wheezes  CV: regular rate and rhythm, normal S1 S2, no S3 or S4, no murmur, click or rub, no peripheral edema and peripheral pulses strong  ABDOMEN: soft, nontender, no hepatosplenomegaly, no masses and bowel sounds normal  : Deferred to urology visit  MS: no gross musculoskeletal defects noted. Trace LE edema  SKIN: no suspicious lesions or rashes  NEURO: Normal strength and tone, mentation intact and speech normal  PSYCH: mentation appears normal, affect normal/bright    ASSESSMENT / PLAN:       ICD-10-CM  " "  1. Routine general medical examination at a health care facility Z00.00    2. Elevated prostate specific antigen (PSA) R97.20 UROLOGY ADULT REFERRAL   3. Idiopathic peripheral neuropathy G60.9 gabapentin (NEURONTIN) 100 MG capsule   4. Essential hypertension, benign I10    5. Chronic systolic congestive heart failure (H) I50.22    6. Chronic atrial fibrillation (H) I48.2    7. Hyperlipidemia with target LDL less than 100 E78.5    8. Erectile dysfunction, unspecified erectile dysfunction type N52.9 sildenafil (REVATIO) 20 MG tablet       End of Life Planning:  Patient currently has an advanced directive: Yes.  Practitioner is supportive of decision.    COUNSELING:  Reviewed preventive health counseling, as reflected in patient instructions        Estimated body mass index is 27.92 kg/(m^2) as calculated from the following:    Height as of this encounter: 5' 11.75\" (1.822 m).    Weight as of this encounter: 204 lb 6.4 oz (92.7 kg).     reports that he has quit smoking. He quit after 3.00 years of use. He has never used smokeless tobacco.      Appropriate preventive services were discussed with this patient, including applicable screening as appropriate for cardiovascular disease, diabetes, osteopenia/osteoporosis, and glaucoma.  As appropriate for age/gender, discussed screening for colorectal cancer, prostate cancer, breast cancer, and cervical cancer. Checklist reviewing preventive services available has been given to the patient.    Reviewed patients plan of care and provided an AVS. The Basic Care Plan (routine screening as documented in Health Maintenance) for Malcolm meets the Care Plan requirement. This Care Plan has been established and reviewed with the Patient.    Counseling Resources:  ATP IV Guidelines  Pooled Cohorts Equation Calculator  Breast Cancer Risk Calculator  FRAX Risk Assessment  ICSI Preventive Guidelines  Dietary Guidelines for Americans, 2010  USDA's MyPlate  ASA Prophylaxis  Lung CA " Screening    Tod Callahan MD  [facility]

## 2018-03-30 NOTE — MR AVS SNAPSHOT
After Visit Summary   3/30/2018    Malcolm HENDERSON [facility]    MRN: [id number]           Patient Information     Date Of Birth          1940        Visit Information        Provider Department      3/30/2018 8:10 AM Tod Callahan MD [facility]        Today's Diagnoses     Routine general medical examination at a health care facility    -  1    Elevated prostate specific antigen (PSA)        Idiopathic peripheral neuropathy        Essential hypertension, benign        Chronic systolic congestive heart failure (H)        Chronic atrial fibrillation (H)        Hyperlipidemia with target LDL less than 100        Erectile dysfunction, unspecified erectile dysfunction type          Care Instructions      Preventive Health Recommendations:     Yearly exam:             See your health care provider every year in order to  o   Review health changes.   o   Discuss preventive care.    o   Review your medicines.    Every year, have a diabetes test (fasting glucose).     Every year, have a cholesterol test.   Shots:     Get a flu shot each year.     Get a tetanus shot every 10 years.     Talk to your pharmacist about the new shingles vaccine.   Nutrition:     Eat at least 5 servings of fruits and vegetables each day.     Eat whole-grain bread, whole-wheat pasta and brown rice instead of white grains and rice.     Get adequate Calcium and Vitamin D.   Lifestyle    Exercise for at least 150 minutes a week (30 minutes a day, 5 days a week). This will help you control your weight and prevent disease.     No smoking.     Wear sunscreen to prevent skin cancer.     See your dentist every six months for an exam and cleaning.     See your eye doctor every 1 to 2 years to screen for conditions such as glaucoma, macular degeneration and cataracts.          Follow-ups after your visit        Additional Services     UROLOGY ADULT REFERRAL       Your provider has referred you to: [facility]: [facility] -  [place] [phone]   [email or web address]    Please be aware that coverage of these services is subject to the terms and limitations of your health insurance plan.  Call member services at your health plan with any benefit or coverage questions.      Please bring the following with you to your appointment:    (1) Any X-Rays, CTs or MRIs which have been performed.  Contact the facility where they were done to arrange for  prior to your scheduled appointment.    (2) List of current medications  (3) This referral request   (4) Any documents/labs given to you for this referral                  Your next 10 appointments already scheduled     Apr 30, 2018  1:30 PM CDT   Anticoagulation Visit with  ANTICOAGULATION CLINIC   [facility] ([facility])    [address] [place] [address]  [address]  [facility] [address] [phone]              Who to contact     If you have questions or need follow up information about today's clinic visit or your schedule please contact [facility] directly at [phone].  Normal or non-critical lab and imaging results will be communicated to you by [employer], letter or phone within 4 business days after the clinic has received the results. If you do not hear from us within 7 days, please contact the clinic through [employer] or phone. If you have a critical or abnormal lab result, we will notify you by phone as soon as possible.  Submit refill requests through [employer] or call your pharmacy and they will forward the refill request to us. Please allow 3 business days for your refill to be completed.          Additional Information About Your Visit        [employer] Information     [employer] gives you secure access to your electronic health record. If you see a primary care provider, you can also send messages to your care team and make appointments. If you have questions, please call your primary care clinic.  If you  "do not have a primary care provider, please call [phone] and they will assist you.        Care EveryWhere ID     This is your Care EveryWhere ID. This could be used by other organizations to access your [facility] medical records  [id number]        Your Vitals Were     Pulse Temperature Height Pulse Oximetry BMI (Body Mass Index)       58 97.3  F (36.3  C) (Oral) 5' 11.75\" (1.822 m) 98% 27.92 kg/m2        Blood Pressure from Last 3 Encounters:   03/30/18 112/63   01/23/18 92/50   01/08/18 124/80    Weight from Last 3 Encounters:   03/30/18 204 lb 6.4 oz (92.7 kg)   01/23/18 203 lb (92.1 kg)   01/08/18 206 lb (93.4 kg)              We Performed the Following     UROLOGY ADULT REFERRAL          Today's Medication Changes          These changes are accurate as of 3/30/18  8:59 AM.  If you have any questions, ask your nurse or doctor.               Start taking these medicines.        Dose/Directions    sildenafil 20 MG tablet   Commonly known as:  REVATIO   Used for:  Erectile dysfunction, unspecified erectile dysfunction type   Started by:  Tod Callahan MD        2-5 tabs po QD prn   Quantity:  30 tablet   Refills:  11         These medicines have changed or have updated prescriptions.        Dose/Directions    gabapentin 100 MG capsule   Commonly known as:  NEURONTIN   This may have changed:    - how much to take  - when to take this   Used for:  Idiopathic peripheral neuropathy   Changed by:  Tod Callahan MD        Dose:  200 mg   Take 2 capsules (200 mg) by mouth 2 times daily   Quantity:  360 capsule   Refills:  3            Where to get your medicines      These medications were sent to [facility] - [address]  [address] [place] [address]     Phone:  [phone]     gabapentin 100 MG capsule    sildenafil 20 MG tablet                Primary Care Provider Office Phone # Fax #    Tod Callahan [phone] [address] " [facility] [address] [place] [address]        Equal Access to Services     [facility] AH: Hadii viviana marks isai Sojusticeali, waaxda luqadaha, qaybta libiamorenodexter dior. So [facility] [phone].    ATENCIÓN: Si habla español, tiene a espinosa disposición servicios gratuitos de asistencia lingüística. [facility] [phone].    We comply with applicable federal civil rights laws and [place] laws. We do not discriminate on the basis of race, color, national origin, age, disability, sex, sexual orientation, or gender identity.            Thank you!     Thank you for choosing [facility]  for your care. Our goal is always to provide you with excellent care. Hearing back from our patients is one way we can continue to improve our services. Please take a few minutes to complete the written survey that you may receive in the mail after your visit with us. Thank you!             Your Updated Medication List - Protect others around you: Learn how to safely use, store and throw away your medicines at www.disposemymeds.org.          This list is accurate as of 3/30/18  8:59 AM.  Always use your most recent med list.                   Brand Name Dispense Instructions for use Diagnosis    bumetanide 1 MG tablet    BUMEX     Take 1 mg by mouth daily 2 in PM        CoQ10 200 MG Caps      Take by mouth daily        COREG 3.125 MG tablet   Generic drug:  carvedilol      Take 3.125 mg by mouth 2 times daily (with meals)        fluticasone 50 MCG/ACT spray    FLONASE    1 Package    Spray 1-2 sprays into both nostrils daily    Postnasal discharge       gabapentin 100 MG capsule    NEURONTIN    360 capsule    Take 2 capsules (200 mg) by mouth 2 times daily    Idiopathic peripheral neuropathy       GLUCOSAMINE CHONDR 1500 COMPLX PO      Take by mouth daily        LIPITOR 40 MG tablet   Generic drug:  atorvastatin      Take 40 mg by mouth daily        lisinopril 2.5 MG tablet    PRINIVIL/Zestril     Take  5 mg by mouth daily.        Magnesium Oxide 250 MG Tabs      Take by mouth daily        metoprolol succinate 25 MG 24 hr tablet    TOPROL-XL     Take 25 mg by mouth daily 2 tablets daily        MULTIVITAMIN PO      Take  by mouth.        PLAVIX 75 MG tablet   Generic drug:  clopidogrel      Take 1 tablet by mouth daily        potassium chloride 20 MEQ Packet    KLOR-CON     Take 20 mEq by mouth 2 times daily        ranitidine 150 MG tablet    ZANTAC    60 tablet    Take 1 tablet (150 mg) by mouth 2 times daily        sildenafil 20 MG tablet    REVATIO    30 tablet    2-5 tabs po QD prn    Erectile dysfunction, unspecified erectile dysfunction type       sildenafil 50 MG tablet    VIAGRA    30 tablet    Take 1 tablet by mouth See Admin Instructions.    Routine physical examination       timolol 0.5 % ophthalmic solution    TIMOPTIC     Place 1 drop into the right eye daily        vitamin D 1000 UNITS capsule      Take 1 capsule by mouth daily        warfarin 5 MG tablet    COUMADIN    80 tablet    Take a half tablet (2.5 mg) Wed and Sat; one tablet (5 mg) Sun, Mon, Tu, Th, Fri OR as directed by INR Clinic    Chronic atrial fibrillation (H)       XALATAN OP      Apply 1 drop to eye daily

## 2018-03-30 NOTE — TELEPHONE ENCOUNTER
Call received from [facility] to notify us that sildenafil is not an appropriate med to send to mail order pharmacy([facility]), they have notified pt that it has to go to retail pharmacy. Pt requested them to transfer the rx to [facility] listed in his file. They will cancel the rx.     Sent a new rx to the requested pharmacy.     sildenafil (REVATIO) 20 MG tablet 30 tablet 11 3/30/2018  --   Si-5 tabs po QD prn     Dorys, RN  Triage Nurse

## 2018-04-26 ENCOUNTER — ANTICOAGULATION THERAPY VISIT (OUTPATIENT)
Dept: NURSING | Facility: CLINIC | Age: 78
End: 2018-04-26
Payer: COMMERCIAL

## 2018-04-26 ENCOUNTER — DOCUMENTATION ONLY (OUTPATIENT)
Dept: PEDIATRICS | Facility: CLINIC | Age: 78
End: 2018-04-26

## 2018-04-26 DIAGNOSIS — I25.5 ISCHEMIC CARDIOMYOPATHY: ICD-10-CM

## 2018-04-26 DIAGNOSIS — I48.20 CHRONIC ATRIAL FIBRILLATION (H): ICD-10-CM

## 2018-04-26 DIAGNOSIS — Z79.01 LONG-TERM (CURRENT) USE OF ANTICOAGULANTS: Primary | ICD-10-CM

## 2018-04-26 LAB — INR POINT OF CARE: 2.8 (ref 0.86–1.14)

## 2018-04-26 PROCEDURE — 36416 COLLJ CAPILLARY BLOOD SPEC: CPT

## 2018-04-26 PROCEDURE — 99207 ZZC NO CHARGE NURSE ONLY: CPT

## 2018-04-26 PROCEDURE — 85610 PROTHROMBIN TIME: CPT | Mod: QW

## 2018-04-26 NOTE — LETTER
[address] [address]                  [phone]                       2018      To: [facility]  [address] [place] [address]      Orders for Malcolm Barker     : 1940     He is being treated with warfarin for atrial fibrillation and ischemic cardiomyopathy and needs his INR checked as needed.       Draw INR every 2 to 6 weeks starting 2018 through 2018.     Fingerstick INR is acceptable.       Diag: Z79.01  Long-term (current) use of anticoagulants, I48.2 Chronic atrial fibrillation (H), I25.5 Ischemic cardiomyopathy            Fax result to:   APPLE Coley or APPLE Anderson  [facility] (Fax: [phone]).  Phone number: [phone]     Please also give result to patient.              Thanks              Tod Callahan MD  UPIN# [id number]  [address] [address]  [phone]

## 2018-04-26 NOTE — PROGRESS NOTES
He spends his summers in [place] and needs orders to have his INRs done there.  He did this last summer also.    Order letter in Dr. Callahan's inbasket.  Please sign, fax to [facility] ([phone]) and mail to patient's home address.     Vianca Kent RN

## 2018-04-26 NOTE — PROGRESS NOTES
ANTICOAGULATION FOLLOW-UP CLINIC VISIT    Patient [facility]  [facility]  Date:  4/26/2018  Contact Type:  Face to Face    SUBJECTIVE:     Patient Findings     Positives No Problem Findings    Comments He spends the summer in [place].  Has his INRs done there.  Order letter done and sent to Dr. Callahan to sign.           OBJECTIVE    INR Protime   Date Value Ref Range Status   04/26/2018 2.8 (A) 0.86 - 1.14 Final       ASSESSMENT / PLAN  INR assessment THER    Recheck INR In: 6 WEEKS    INR Location Clinic      Anticoagulation Summary as of 4/26/2018     INR goal 2.0-3.0   Today's INR 2.8   Maintenance plan 2.5 mg (5 mg x 0.5) on Wed, Sat; 5 mg (5 mg x 1) all other days   Full instructions 2.5 mg on Wed, Sat; 5 mg all other days   Weekly total 30 mg   No change documented Vianca Kent, RN   Plan last modified Mandi Phoenix RN (6/10/2016)   Next INR check 6/7/2018   Priority INR   Target end date Indefinite    Indications   Long-term (current) use of anticoagulants [Z79.01] [Z79.01]  Chronic atrial fibrillation (H) [I48.2]         Anticoagulation Episode Summary     INR check location     Preferred lab     Send INR reminders to [facility]    Comments 5mg tabs // APPT CARD      Anticoagulation Care Providers     Provider Role Specialty Phone number    Tod Callahan MD  Internal Medicine [phone]            See the Encounter Report to view Anticoagulation Flowsheet and Dosing Calendar (Go to Encounters tab in chart review, and find the Anticoagulation Therapy Visit)        Vianca Kent, APPLE

## 2018-04-26 NOTE — MR AVS SNAPSHOT
Malcolm BEATRIZ Zambranocarlostin   4/26/2018 1:15 PM   Anticoagulation Therapy Visit    Description:  78 year old male   Provider:  [facility]   Department:   Nurse           INR as of 4/26/2018     Today's INR 2.8      Anticoagulation Summary as of 4/26/2018     INR goal 2.0-3.0   Today's INR 2.8   Full instructions 2.5 mg on Wed, Sat; 5 mg all other days   Next INR check 6/7/2018    Indications   Long-term (current) use of anticoagulants [Z79.01] [Z79.01]  Chronic atrial fibrillation (H) [I48.2]         Your next Anticoagulation Clinic appointment(s)     Jun 07, 2018  1:15 PM CDT   Anticoagulation Visit with  ANTICOAGULATION CLINIC   [facility]    [address]  [address]  [facility] [address] [phone]              Contact Numbers     [facility]  Please call  [phone] to cancel and/or reschedule your appointment   Please call  [phone] with any problems or questions regarding your therapy.        April 2018 Details    Sun Mon Tue Wed Thu Fri Sat     1               2               3               4               5               6               7                 8               9               10               11               12               13               14                 15               16               17               18               19               20               21                 22               23               24               25               26      5 mg   See details      27      5 mg         28      2.5 mg           29      5 mg         30      5 mg               Date Details   04/26 This INR check               How to take your warfarin dose     To take:  2.5 mg Take 0.5 of a 5 mg tablet.    To take:  5 mg Take 1 of the 5 mg tablets.           May 2018 Details    Sun Mon Tue Wed Thu Fri Sat       1      5 mg         2      2.5 mg         3      5 mg         4      5 mg         5      2.5 mg           6       5 mg         7      5 mg         8      5 mg         9      2.5 mg         10      5 mg         11      5 mg         12      2.5 mg           13      5 mg         14      5 mg         15      5 mg         16      2.5 mg         17      5 mg         18      5 mg         19      2.5 mg           20      5 mg         21      5 mg         22      5 mg         23      2.5 mg         24      5 mg         25      5 mg         26      2.5 mg           27      5 mg         28      5 mg         29      5 mg         30      2.5 mg         31      5 mg            Date Details   No additional details            How to take your warfarin dose     To take:  2.5 mg Take 0.5 of a 5 mg tablet.    To take:  5 mg Take 1 of the 5 mg tablets.           June 2018 Details    Sun Mon Tue Wed Thu Fri Sat          1      5 mg         2      2.5 mg           3      5 mg         4      5 mg         5      5 mg         6      2.5 mg         7            8               9                 10               11               12               13               14               15               16                 17               18               19               20               21               22               23                 24               25               26               27               28               29               30                Date Details   No additional details    Date of next INR:  6/7/2018         How to take your warfarin dose     To take:  2.5 mg Take 0.5 of a 5 mg tablet.    To take:  5 mg Take 1 of the 5 mg tablets.

## 2018-04-26 NOTE — PROGRESS NOTES
Reimbursement rules require all INR Clinic patients to have a yearly renewal of an INR Clinic Referral order.  Referral must be signed by the PCP for each patient.      New dot phrase added to referral. <dot>inrref  Referral is pended. Please complete and sign.     Vianca Kent RN

## 2018-05-31 DIAGNOSIS — N13.8 HYPERTROPHY OF PROSTATE WITH URINARY OBSTRUCTION: Primary | ICD-10-CM

## 2018-05-31 DIAGNOSIS — R97.20 ELEVATED PROSTATE SPECIFIC ANTIGEN (PSA): Primary | ICD-10-CM

## 2018-05-31 DIAGNOSIS — I10 ESSENTIAL HYPERTENSION, BENIGN: ICD-10-CM

## 2018-05-31 DIAGNOSIS — N40.1 HYPERTROPHY OF PROSTATE WITH URINARY OBSTRUCTION: Primary | ICD-10-CM

## 2018-06-01 ENCOUNTER — OFFICE VISIT (OUTPATIENT)
Dept: UROLOGY | Facility: CLINIC | Age: 78
End: 2018-06-01
Payer: COMMERCIAL

## 2018-06-01 VITALS
HEIGHT: 72 IN | SYSTOLIC BLOOD PRESSURE: 98 MMHG | OXYGEN SATURATION: 97 % | DIASTOLIC BLOOD PRESSURE: 58 MMHG | HEART RATE: 51 BPM | BODY MASS INDEX: 27.09 KG/M2 | WEIGHT: 200 LBS

## 2018-06-01 DIAGNOSIS — R97.20 ELEVATED PROSTATE SPECIFIC ANTIGEN (PSA): ICD-10-CM

## 2018-06-01 DIAGNOSIS — N40.1 HYPERTROPHY OF PROSTATE WITH URINARY OBSTRUCTION: ICD-10-CM

## 2018-06-01 DIAGNOSIS — N13.8 HYPERTROPHY OF PROSTATE WITH URINARY OBSTRUCTION: ICD-10-CM

## 2018-06-01 DIAGNOSIS — I10 ESSENTIAL HYPERTENSION, BENIGN: ICD-10-CM

## 2018-06-01 LAB
ALBUMIN UR-MCNC: NEGATIVE MG/DL
APPEARANCE UR: CLEAR
BILIRUB UR QL STRIP: NEGATIVE
COLOR UR AUTO: YELLOW
GLUCOSE UR STRIP-MCNC: NEGATIVE MG/DL
HGB UR QL STRIP: ABNORMAL
KETONES UR STRIP-MCNC: NEGATIVE MG/DL
LEUKOCYTE ESTERASE UR QL STRIP: NEGATIVE
NITRATE UR QL: NEGATIVE
PH UR STRIP: 6 PH (ref 5–7)
SOURCE: ABNORMAL
SP GR UR STRIP: 1.01 (ref 1–1.03)
UROBILINOGEN UR STRIP-ACNC: 0.2 EU/DL (ref 0.2–1)

## 2018-06-01 PROCEDURE — 36415 COLL VENOUS BLD VENIPUNCTURE: CPT | Performed by: UROLOGY

## 2018-06-01 PROCEDURE — 81003 URINALYSIS AUTO W/O SCOPE: CPT | Performed by: UROLOGY

## 2018-06-01 PROCEDURE — 99202 OFFICE O/P NEW SF 15 MIN: CPT | Performed by: UROLOGY

## 2018-06-01 PROCEDURE — 84153 ASSAY OF PSA TOTAL: CPT | Performed by: UROLOGY

## 2018-06-01 RX ORDER — OFLOXACIN 3 MG/ML
SOLUTION/ DROPS OPHTHALMIC
COMMUNITY
Start: 2018-01-23 | End: 2023-01-01

## 2018-06-01 RX ORDER — POTASSIUM CHLORIDE 1500 MG/1
TABLET, EXTENDED RELEASE ORAL
COMMUNITY
Start: 2014-09-17 | End: 2023-01-01

## 2018-06-01 RX ORDER — VITAMIN E 268 MG
1000 CAPSULE ORAL
COMMUNITY
Start: 2016-03-24 | End: 2019-04-08

## 2018-06-01 RX ORDER — BROMFENAC 1.03 MG/ML
SOLUTION/ DROPS OPHTHALMIC
COMMUNITY
Start: 2018-01-23 | End: 2023-01-01

## 2018-06-01 RX ORDER — SODIUM PHOSPHATE,MONO-DIBASIC 19G-7G/118
1 ENEMA (ML) RECTAL
COMMUNITY
End: 2018-11-16

## 2018-06-01 RX ORDER — LATANOPROST 50 UG/ML
SOLUTION/ DROPS OPHTHALMIC
COMMUNITY
Start: 2018-05-08

## 2018-06-01 RX ORDER — INFLUENZA A VIRUS A/VICTORIA/4897/2022 IVR-238 (H1N1) ANTIGEN (FORMALDEHYDE INACTIVATED), INFLUENZA A VIRUS A/CALIFORNIA/122/2022 SAN-022 (H3N2) ANTIGEN (FORMALDEHYDE INACTIVATED), AND INFLUENZA B VIRUS B/MICHIGAN/01/2021 ANTIGEN (FORMALDEHYDE INACTIVATED) 60; 60; 60 UG/.5ML; UG/.5ML; UG/.5ML
INJECTION, SUSPENSION INTRAMUSCULAR
COMMUNITY
Start: 2017-10-16 | End: 2018-11-16

## 2018-06-01 ASSESSMENT — PAIN SCALES - GENERAL: PAINLEVEL: NO PAIN (0)

## 2018-06-01 NOTE — LETTER
6/1/2018     RE: Malcolm Barker  [address] [place] [address]     Dear Colleague,    Thank you for referring your patient, Malcolm Barker, to the [facility] [place] [facility]. Please see a copy of my visit note below.    Malcolm Barker is a 78-year-old male referred to rule out prostate cancer. His PSA a year ago was 4.72, last month was 4.03. There is no family history of prostate cancer and he has no difficulty voiding.  Other past medical history: Coronary artery disease with stents, major MI in 2013, Dupuytren's contracture, GERD, hypertension, generalized osteoarthritis, glaucoma, abdominal hernia, former smoker  Medications: Atorvastatin, from day solution, Bumex, Coreg, vitamin D, Plavix, coenzyme Q10, flaxseed, Flonase spray, Fluzone, Neurontin, glucosamine/chondroitin, Xalatan ophthalmic solution, lisinopril, magnesium oxide, metoprolol, multivitamin, Ocuflox ophthalmic solution, omeprazole, potassium chloride, Zantac, sildenafil, atenolol, warfarin  Allergies: None  Exam: Normal appearance, normal vital signs. Alert and oriented, normocephalic, normal respirations, neuro grossly intact. Normal sphincter tone, no rectal mass or impaction, benign feeling prostate about 45 cc, seminal vesicles nonpalpable  Assessment: BPH, PSA decreased from last year and at upper normal limit-do not recommend future PSAs unless palpable abnormality. The patient needs yearly digital rectal exam in the future with his physical  See me p.r.n.  AUA guidelines for prostate cancer screening recommend, PSA between ages 55 and 70. Yearly digital rectal exam     Order(s) created erroneously. Erroneous order ID: [id number]   Order canceled by: TAMAR CAMPOS   Order cancel date/time: 06/04/2018 11:31 AM       Again, thank you for allowing me to participate in the care of your patient.      Sincerely,    Demetrius Parsons,  MD

## 2018-06-01 NOTE — PROGRESS NOTES
[facility] is a 78-year-old male referred to rule out prostate cancer. His PSA a year ago was 4.72, last month was 4.03. There is no family history of prostate cancer and he has no difficulty voiding.  Other past medical history: Coronary artery disease with stents, major MI in 2013, Dupuytren's contracture, GERD, hypertension, generalized osteoarthritis, glaucoma, abdominal hernia, former smoker  Medications: Atorvastatin, from day solution, Bumex, Coreg, vitamin D, Plavix, coenzyme Q10, flaxseed, Flonase spray, Fluzone, Neurontin, glucosamine/chondroitin, Xalatan ophthalmic solution, lisinopril, magnesium oxide, metoprolol, multivitamin, Ocuflox ophthalmic solution, omeprazole, potassium chloride, Zantac, sildenafil, atenolol, warfarin  Allergies: None  Exam: Normal appearance, normal vital signs. Alert and oriented, normocephalic, normal respirations, neuro grossly intact. Normal sphincter tone, no rectal mass or impaction, benign feeling prostate about 45 cc, seminal vesicles nonpalpable  Assessment: BPH, PSA decreased from last year and at upper normal limit-do not recommend future PSAs unless palpable abnormality. The patient needs yearly digital rectal exam in the future with his physical  See me p.r.n.  AUA guidelines for prostate cancer screening recommend, PSA between ages 55 and 70. Yearly digital rectal exam

## 2018-06-01 NOTE — NURSING NOTE
Chief Complaint   Patient presents with     Clinic Care Coordination - Follow-up     Pt here for elevated PSA     Morena Brown CMA

## 2018-06-01 NOTE — MR AVS SNAPSHOT
After Visit Summary   6/1/2018    [facility]    MR[facility]: [id number]           Patient Information     Date Of Birth          1940        Visit Information        Provider Department      6/1/2018 9:20 AM Demetrius Parsons MD [facility] [place]        Today's Diagnoses     Hypertrophy of prostate with urinary obstruction        Essential hypertension, benign        Elevated prostate specific antigen (PSA)           Follow-ups after your visit        Your next 10 appointments already scheduled     Jun 07, 2018  1:15 PM CDT   Anticoagulation Visit with  ANTICOAGULATION CLINIC   [facility]    [address] [place] [address] [address]  [facility] [address] [phone]              Who to contact     If you have questions or need follow up information about today's clinic visit or your schedule please contact [facility] [place] directly at [phone].  Normal or non-critical lab and imaging results will be communicated to you by MyChart, letter or phone within 4 business days after the clinic has received the results. If you do not hear from us within 7 days, please contact the clinic through [employer] or phone. If you have a critical or abnormal lab result, we will notify you by phone as soon as possible.  Submit refill requests through [employer] or call your pharmacy and they will forward the refill request to us. Please allow 3 business days for your refill to be completed.          Additional Information About Your Visit        MyChart Information     [employer] gives you secure access to your electronic health record. If you see a primary care provider, you can also send messages to your care team and make appointments. If you have questions, please call your primary care clinic.  If you do not have a primary care provider, please call [phone] and they will assist you.        Care  EveryWhere ID     This is your Care EveryWhere ID. This could be used by other organizations to access your [place] medical records  [id number]        Your Vitals Were     Pulse Height Pulse Oximetry BMI (Body Mass Index)          51 1.829 m (6') 97% 27.12 kg/m2         Blood Pressure from Last 3 Encounters:   06/01/18 98/58   03/30/18 112/63   01/23/18 92/50    Weight from Last 3 Encounters:   06/01/18 90.7 kg (200 lb)   03/30/18 92.7 kg (204 lb 6.4 oz)   01/23/18 92.1 kg (203 lb)              We Performed the Following     PSA Diag Urologic Phys     UA without Microscopic        Primary Care Provider Office Phone # Fax #    Tod Callahan [phone] [address] [place] [address]        Equal Access to Services     [facility]: Hadii aad kendrick hadasho Sodonnell, waaxda luqadaha, qaybta kaalmada adeegyada, dexter marquezin hayivy castro . So [facility] [phone].    ATENCIÓN: Si habla español, tiene a espinosa disposición servicios gratuitos de asistencia lingüística. Llame al [phone].    We comply with applicable federal civil rights laws and [place] laws. We do not discriminate on the basis of race, color, national origin, age, disability, sex, sexual orientation, or gender identity.            Thank you!     Thank you for choosing [facility] [place]  for your care. Our goal is always to provide you with excellent care. Hearing back from our patients is one way we can continue to improve our services. Please take a few minutes to complete the written survey that you may receive in the mail after your visit with us. Thank you!             Your Updated Medication List - Protect others around you: Learn how to safely use, store and throw away your medicines at www.disposemymeds.org.          This list is accurate as of 6/1/18  9:53 AM.  Always use your most recent med list.                   Brand Name Dispense Instructions for use  Diagnosis    Bromfenac Sodium 0.09 % Soln    BROMDAY          bumetanide 1 MG tablet    BUMEX     Take 1 mg by mouth daily 2 in PM        CoQ10 200 MG Caps      Take by mouth daily        COREG 3.125 MG tablet   Generic drug:  carvedilol      Take 3.125 mg by mouth 2 times daily (with meals)        flaxseed oil 1000 MG Caps      1,000 mg        fluticasone 50 MCG/ACT spray    FLONASE    1 Package    Spray 1-2 sprays into both nostrils daily    Postnasal discharge       FLUZONE HIGH-DOSE 0.5 ML injection   Generic drug:  influenza Vac Split High-Dose           gabapentin 100 MG capsule    NEURONTIN    360 capsule    Take 2 capsules (200 mg) by mouth 2 times daily    Idiopathic peripheral neuropathy       GLUCOSAMINE CHONDR 1500 COMPLX PO      Take by mouth daily        glucosamine-chondroitin 500-400 MG Caps per capsule      1 capsule        LIPITOR 40 MG tablet   Generic drug:  atorvastatin      Take 40 mg by mouth daily        lisinopril 2.5 MG tablet    PRINIVIL/Zestril     Take 5 mg by mouth daily.        Magnesium Oxide 250 MG Tabs      Take by mouth daily        metoprolol succinate 25 MG 24 hr tablet    TOPROL-XL     Take 25 mg by mouth daily 2 tablets daily        MULTIVITAMIN PO      Take  by mouth.        ofloxacin 0.3 % ophthalmic solution    OCUFLOX          omeprazole 20 MG CR capsule    priLOSEC          PLAVIX 75 MG tablet   Generic drug:  clopidogrel      Take 1 tablet by mouth daily        potassium chloride 20 MEQ Packet    KLOR-CON     Take 20 mEq by mouth 2 times daily        potassium chloride SA 20 MEQ CR tablet    K-DUR/KLOR-CON M     Take 1 tablet twice daily. Take an additional 20 meq on days of Metolazone per Maria E Johnson 9/17/14        ranitidine 150 MG tablet    ZANTAC    60 tablet    Take 1 tablet (150 mg) by mouth 2 times daily        sildenafil 20 MG tablet    REVATIO    30 tablet    2-5 tabs po QD prn    Erectile dysfunction, unspecified erectile dysfunction type       sildenafil 50 MG  tablet    VIAGRA    30 tablet    Take 1 tablet by mouth See Admin Instructions.    Routine physical examination       timolol 0.5 % ophthalmic solution    TIMOPTIC     Place 1 drop into the right eye daily        vitamin D 1000 units capsule      Take 1 capsule by mouth daily        warfarin 5 MG tablet    COUMADIN    80 tablet    Take a half tablet (2.5 mg) Wed and Sat; one tablet (5 mg) Sun, Mon, Tu, Th, Fri OR as directed by INR Clinic    Chronic atrial fibrillation (H)       * XALATAN OP      Apply 1 drop to eye daily        * latanoprost 0.005 % ophthalmic solution    XALATAN          * Notice:  This list has 2 medication(s) that are the same as other medications prescribed for you. Read the directions carefully, and ask your doctor or other care provider to review them with you.

## 2018-06-04 NOTE — PROGRESS NOTES
Order(s) created erroneously. Erroneous order ID: [id number]   Order canceled by: TAMAR CAMPOS   Order cancel date/time: 06/04/2018 11:31 AM

## 2018-06-11 ENCOUNTER — TRANSFERRED RECORDS (OUTPATIENT)
Dept: HEALTH INFORMATION MANAGEMENT | Facility: CLINIC | Age: 78
End: 2018-06-11

## 2018-06-11 LAB — INR PPP: 3.9

## 2018-06-15 ENCOUNTER — ANTICOAGULATION THERAPY VISIT (OUTPATIENT)
Dept: NURSING | Facility: CLINIC | Age: 78
End: 2018-06-15
Payer: COMMERCIAL

## 2018-06-15 DIAGNOSIS — Z79.01 LONG-TERM (CURRENT) USE OF ANTICOAGULANTS: ICD-10-CM

## 2018-06-15 DIAGNOSIS — I48.20 CHRONIC ATRIAL FIBRILLATION (H): ICD-10-CM

## 2018-06-15 PROCEDURE — 99207 ZZC NO CHARGE NURSE ONLY: CPT

## 2018-06-15 NOTE — PROGRESS NOTES
ANTICOAGULATION FOLLOW-UP     [facility]  Date:  6/15/2018  Contact Type:  Telephone  Patient is in [place] for the summer.  Has his INRs done there at [facility].    SUBJECTIVE:     Patient Findings     Positives Unexplained INR or factor level change    Comments Had his INR done in [place] at [facility] lab on 6/11/18.  Unsure where result was faxed to - Dr. Jalloh's name was on it instead of Dr. Callahan.  Lab sheet was scanned into EMR on 6/13/18.  INR Clinic was not notified of result.    Patient called [facility] today to see if result was received.  Requested that he call [facility] with the result next time.    Patient denies: extra doses, illness, inflammation,   bleeding/bruises, medication changes,   or increased alcohol intake.             OBJECTIVE    INR   Date Value Ref Range Status   06/11/2018 3.9  Final       ASSESSMENT / PLAN  INR assessment SUPRA    Recheck INR In: 4 WEEKS    INR Location Outside lab      Anticoagulation Summary as of 6/15/2018     INR goal 2.0-3.0   Today's INR 3.9! (6/11/2018)   Warfarin maintenance plan 2.5 mg (5 mg x 0.5) on Wed, Sat; 5 mg (5 mg x 1) all other days   Full warfarin instructions 2.5 mg on Wed, Sat; 5 mg all other days   Weekly warfarin total 30 mg   No change documented Vianca Kent, RN   Plan last modified Mandi Phoenix RN (6/10/2016)   Next INR check 7/9/2018   Priority INR   Target end date Indefinite    Indications   Long-term (current) use of anticoagulants [Z79.01] [Z79.01]  Chronic atrial fibrillation (H) [I48.2]         Anticoagulation Episode Summary     INR check location     Preferred lab     Send INR reminders to [facility]    Comments 5mg tabs // APPT CARD // cell phone [phone]      Anticoagulation Care Providers     Provider Role Specialty Phone number    Tod Callahan MD  Internal Medicine [phone]            See the Encounter Report to view Anticoagulation Flowsheet and Dosing Calendar (Go  to Encounters tab in chart review, and find the Anticoagulation Therapy Visit)        Vianca Kent RN

## 2018-06-15 NOTE — MR AVS SNAPSHOT
[facility]   6/15/2018 11:45 AM   Anticoagulation Therapy Visit    Description:  78 year old male   Provider:  [facility]   Department:  [facility]           INR as of 6/15/2018     Today's INR 3.9! (6/11/2018)      Anticoagulation Summary as of 6/15/2018     INR goal 2.0-3.0   Today's INR 3.9! (6/11/2018)   Full warfarin instructions 2.5 mg on Wed, Sat; 5 mg all other days   Next INR check 7/9/2018    Indications   Long-term (current) use of anticoagulants [Z79.01] [Z79.01]  Chronic atrial fibrillation (H) [I48.2]         Contact Numbers     [facility]  Please call  [phone] to cancel and/or reschedule your appointment   Please call  [phone] with any problems or questions regarding your therapy.        June 2018 Details    Sun Mon Tue Wed Thu Fri Sat          1               2                 3               4               5               6               7               8               9                 10               11               12               13               14               15      5 mg   See details      16      2.5 mg           17      5 mg         18      5 mg         19      5 mg         20      2.5 mg         21      5 mg         22      5 mg         23      2.5 mg           24      5 mg         25      5 mg         26      5 mg         27      2.5 mg         28      5 mg         29      5 mg         30      2.5 mg          Date Details   06/15 This INR check               How to take your warfarin dose     To take:  2.5 mg Take 0.5 of a 5 mg tablet.    To take:  5 mg Take 1 of the 5 mg tablets.           July 2018 Details    Sun Mon Tue Wed Thu Fri Sat     1      5 mg         2      5 mg         3      5 mg         4      2.5 mg         5      5 mg         6      5 mg         7      2.5 mg           8      5 mg         9            10               11               12               13               14                 15               16               17                18               19               20               21                 22               23               24               25               26               27               28                 29               30               31                    Date Details   No additional details    Date of next INR:  7/9/2018         How to take your warfarin dose     To take:  2.5 mg Take 0.5 of a 5 mg tablet.    To take:  5 mg Take 1 of the 5 mg tablets.

## 2018-07-08 ENCOUNTER — MYC REFILL (OUTPATIENT)
Dept: PEDIATRICS | Facility: CLINIC | Age: 78
End: 2018-07-08

## 2018-07-08 DIAGNOSIS — I48.20 CHRONIC ATRIAL FIBRILLATION (H): ICD-10-CM

## 2018-07-08 RX ORDER — WARFARIN SODIUM 5 MG/1
TABLET ORAL
Qty: 80 TABLET | Refills: 4 | Status: CANCELLED | OUTPATIENT
Start: 2018-07-08

## 2018-07-09 NOTE — TELEPHONE ENCOUNTER
Message from [employer]:  Original authorizing provider: MD Jaskaran Bullard would like a refill of the following medications:  warfarin (COUMADIN) 5 MG tablet [Tod Callahan MD]    Preferred pharmacy: [id number] - [place], [place] - [address]    Comment:

## 2018-07-12 ENCOUNTER — ANTICOAGULATION THERAPY VISIT (OUTPATIENT)
Dept: NURSING | Facility: CLINIC | Age: 78
End: 2018-07-12
Payer: COMMERCIAL

## 2018-07-12 DIAGNOSIS — I48.20 CHRONIC ATRIAL FIBRILLATION (H): ICD-10-CM

## 2018-07-12 DIAGNOSIS — Z79.01 LONG-TERM (CURRENT) USE OF ANTICOAGULANTS: ICD-10-CM

## 2018-07-12 LAB — INR POINT OF CARE: 3 (ref 0.86–1.14)

## 2018-07-12 PROCEDURE — 36416 COLLJ CAPILLARY BLOOD SPEC: CPT

## 2018-07-12 PROCEDURE — 99207 ZZC NO CHARGE NURSE ONLY: CPT

## 2018-07-12 PROCEDURE — 85610 PROTHROMBIN TIME: CPT | Mod: QW

## 2018-07-12 NOTE — PROGRESS NOTES
ANTICOAGULATION FOLLOW-UP CLINIC VISIT    Patient Name:  Malcolm Barker  Date:  7/12/2018  Contact Type:  Face to Face  INR in range. No concerns.     Pt is going on a vacation to [place](end of July) for 10 days. Is planning to check his INR in [place] at [facility] lab on 8/16/18. He will make sure that they fax the lab result to us. He will also notify us if INR result is off.     Advised to continue same dose & recheck INR on 8/16 through [facility] lab.     SUBJECTIVE:     Patient Findings     Positives No Problem Findings    Comments Denies bleeding/bruising or missed dose.             OBJECTIVE    INR Protime   Date Value Ref Range Status   07/12/2018 3.0 (A) 0.86 - 1.14 Final       ASSESSMENT / PLAN  INR assessment THER    Recheck INR In: 5 WEEKS    INR Location Outside lab      Anticoagulation Summary as of 7/12/2018     INR goal 2.0-3.0   Today's INR 3.0   Warfarin maintenance plan 2.5 mg (5 mg x 0.5) on Wed, Sat; 5 mg (5 mg x 1) all other days   Full warfarin instructions 2.5 mg on Wed, Sat; 5 mg all other days   Weekly warfarin total 30 mg   No change documented Yudy Plunektt RN   Plan last modified Mandi Phoenix RN (6/10/2016)   Next INR check 8/16/2018   Priority INR   Target end date Indefinite    Indications   Long-term (current) use of anticoagulants [Z79.01] [Z79.01]  Chronic atrial fibrillation (H) [I48.2]         Anticoagulation Episode Summary     INR check location     Preferred lab     Send INR reminders to [facility]    Comments 5mg tabs // APPT CARD // cell phone [phone]      Anticoagulation Care Providers     Provider Role Specialty Phone number    Tod Callahan MD  Internal Medicine [phone]            See the Encounter Report to view Anticoagulation Flowsheet and Dosing Calendar (Go to Encounters tab in chart review, and find the Anticoagulation Therapy Visit)    Yudy Plunkett RN

## 2018-07-12 NOTE — MR AVS SNAPSHOT
[facility]   7/12/2018 11:30 AM   Anticoagulation Therapy Visit    Description:  78 year old male   Provider:  [facility]   Department:  [facility]           INR as of 7/12/2018     Today's INR 3.0      Anticoagulation Summary as of 7/12/2018     INR goal 2.0-3.0   Today's INR 3.0   Full warfarin instructions 2.5 mg on Wed, Sat; 5 mg all other days   Next INR check 8/16/2018    Indications   Long-term (current) use of anticoagulants [Z79.01] [Z79.01]  Chronic atrial fibrillation (H) [I48.2]         Contact Numbers     [facility]  Please call  [phone] to cancel and/or reschedule your appointment   Please call  [phone] with any problems or questions regarding your therapy.        July 2018 Details    Sun Mon Tue Wed Thu Fri Sat     1               2               3               4               5               6               7                 8               9               10               11               12      5 mg   See details      13      5 mg         14      2.5 mg           15      5 mg         16      5 mg         17      5 mg         18      2.5 mg         19      5 mg         20      5 mg         21      2.5 mg           22      5 mg         23      5 mg         24      5 mg         25      2.5 mg         26      5 mg         27      5 mg         28      2.5 mg           29      5 mg         30      5 mg         31      5 mg              Date Details   07/12 This INR check               How to take your warfarin dose     To take:  2.5 mg Take 0.5 of a 5 mg tablet.    To take:  5 mg Take 1 of the 5 mg tablets.           August 2018 Details    Sun Mon Tue Wed Thu Fri Sat        1      2.5 mg         2      5 mg         3      5 mg         4      2.5 mg           5      5 mg         6      5 mg         7      5 mg         8      2.5 mg         9      5 mg         10      5 mg         11      2.5 mg           12      5 mg         13      5 mg         14      5 mg          15      2.5 mg         16            17               18                 19               20               21               22               23               24               25                 26               27               28               29               30               31                 Date Details   No additional details    Date of next INR:  8/16/2018         How to take your warfarin dose     To take:  2.5 mg Take 0.5 of a 5 mg tablet.    To take:  5 mg Take 1 of the 5 mg tablets.

## 2018-08-01 ENCOUNTER — MYC MEDICAL ADVICE (OUTPATIENT)
Dept: PEDIATRICS | Facility: CLINIC | Age: 78
End: 2018-08-01

## 2018-08-01 DIAGNOSIS — A09 TRAVELERS' DIARRHEA: Primary | ICD-10-CM

## 2018-08-01 RX ORDER — CIPROFLOXACIN 500 MG/1
500 TABLET, FILM COATED ORAL 2 TIMES DAILY
Qty: 6 TABLET | Refills: 1 | Status: SHIPPED | OUTPATIENT
Start: 2018-08-01 | End: 2018-11-16

## 2018-08-01 NOTE — TELEPHONE ENCOUNTER
Please review the [facility] message from pt. LOV was on 3/30/18. Pended cipro 500 mg bid x 3 days for travelers diarrhea. Please review & sign, if appropriate.     Tia ANDRADE RN  Triage Nurse

## 2018-08-06 ENCOUNTER — ANTICOAGULATION THERAPY VISIT (OUTPATIENT)
Dept: NURSING | Facility: CLINIC | Age: 78
End: 2018-08-06
Payer: COMMERCIAL

## 2018-08-06 ENCOUNTER — TRANSFERRED RECORDS (OUTPATIENT)
Dept: HEALTH INFORMATION MANAGEMENT | Facility: CLINIC | Age: 78
End: 2018-08-06

## 2018-08-06 ENCOUNTER — MYC MEDICAL ADVICE (OUTPATIENT)
Dept: PEDIATRICS | Facility: CLINIC | Age: 78
End: 2018-08-06

## 2018-08-06 DIAGNOSIS — Z79.01 LONG-TERM (CURRENT) USE OF ANTICOAGULANTS: ICD-10-CM

## 2018-08-06 DIAGNOSIS — I48.20 CHRONIC ATRIAL FIBRILLATION (H): ICD-10-CM

## 2018-08-06 LAB — INR PPP: 3

## 2018-08-06 PROCEDURE — 99207 ZZC NO CHARGE NURSE ONLY: CPT

## 2018-08-06 NOTE — PROGRESS NOTES
ANTICOAGULATION FOLLOW-UP CLINIC VISIT    Patient Name:  Malcolm Barker  Date:  8/6/2018  Contact Type:  Telephone  Pt calling to notify that he had his INR checked through [facility] lab in [place] today, it was 3.0.     Won't be back in cities till the end of Sept. So, he is planning to check his INR in [place] again in 4-5 weeks. He will make sure that they fax the lab result to us. He will also notify us if INR result is off.      Advised to continue same dose & recheck INR in 4-5 weeks through [facility] lab. Pt agrees.    SUBJECTIVE:     Patient Findings     Positives No Problem Findings    Comments Denies bleeding/bruising or missed dose.             OBJECTIVE    INR   Date Value Ref Range Status   08/06/2018 3.0  Final       ASSESSMENT / PLAN  INR assessment THER    Recheck INR In: 4 WEEKS    INR Location Outside lab      Anticoagulation Summary as of 8/6/2018     INR goal 2.0-3.0   Today's INR 3.0   Warfarin maintenance plan 2.5 mg (5 mg x 0.5) on Wed, Sat; 5 mg (5 mg x 1) all other days   Full warfarin instructions 2.5 mg on Wed, Sat; 5 mg all other days   Weekly warfarin total 30 mg   No change documented Yudy Plunkett RN   Plan last modified Mandi Phoenix RN (6/10/2016)   Next INR check 9/3/2018   Priority INR   Target end date Indefinite    Indications   Long-term (current) use of anticoagulants [Z79.01] [Z79.01]  Chronic atrial fibrillation (H) [I48.2]         Anticoagulation Episode Summary     INR check location     Preferred lab     Send INR reminders to [facility]    Comments 5mg tabs // APPT CARD // cell phone [phone]      Anticoagulation Care Providers     Provider Role Specialty Phone number    Tod Callahan MD  Internal Medicine [phone]            See the Encounter Report to view Anticoagulation Flowsheet and Dosing Calendar (Go to Encounters tab in chart review, and find the Anticoagulation Therapy Visit)    Yudy Plunkett RN

## 2018-08-06 NOTE — MR AVS SNAPSHOT
Malcolm HENDERSON [facility]   8/6/2018 5:00 PM   Anticoagulation Therapy Visit    Description:  78 year old male   Provider:  [facility]   Department:  [facility]           INR as of 8/6/2018     Today's INR 3.0      Anticoagulation Summary as of 8/6/2018     INR goal 2.0-3.0   Today's INR 3.0   Full warfarin instructions 2.5 mg on Wed, Sat; 5 mg all other days   Next INR check 9/3/2018    Indications   Long-term (current) use of anticoagulants [Z79.01] [Z79.01]  Chronic atrial fibrillation (H) [I48.2]         Contact Numbers     [facility]  Please call  [phone] to cancel and/or reschedule your appointment   Please call  [phone] with any problems or questions regarding your therapy.        August 2018 Details    Sun Mon Tue Wed Thu Fri Sat        1               2               3               4                 5               6      5 mg   See details      7      5 mg         8      2.5 mg         9      5 mg         10      5 mg         11      2.5 mg           12      5 mg         13      5 mg         14      5 mg         15      2.5 mg         16      5 mg         17      5 mg         18      2.5 mg           19      5 mg         20      5 mg         21      5 mg         22      2.5 mg         23      5 mg         24      5 mg         25      2.5 mg           26      5 mg         27      5 mg         28      5 mg         29      2.5 mg         30      5 mg         31      5 mg           Date Details   08/06 This INR check               How to take your warfarin dose     To take:  2.5 mg Take 0.5 of a 5 mg tablet.    To take:  5 mg Take 1 of the 5 mg tablets.           September 2018 Details    Sun Mon Tue Wed Thu Fri Sat           1      2.5 mg           2      5 mg         3            4               5               6               7               8                 9               10               11               12               13               14               15                 16                17               18               19               20               21               22                 23               24               25               26               27               28               29                 30                      Date Details   No additional details    Date of next INR:  9/3/2018         How to take your warfarin dose     To take:  2.5 mg Take 0.5 of a 5 mg tablet.    To take:  5 mg Take 1 of the 5 mg tablets.

## 2018-08-29 ENCOUNTER — TELEPHONE (OUTPATIENT)
Dept: NURSING | Facility: CLINIC | Age: 78
End: 2018-08-29

## 2018-08-29 NOTE — PROGRESS NOTES
"Patient had labs checked and sent in on 8/6/18. Will recheck in [facility] again \"in 4-5 weeks\", end of September.   "

## 2018-09-13 LAB — INR PPP: 2.2

## 2018-09-17 ENCOUNTER — ANTICOAGULATION THERAPY VISIT (OUTPATIENT)
Dept: PEDIATRICS | Facility: CLINIC | Age: 78
End: 2018-09-17
Payer: COMMERCIAL

## 2018-09-17 DIAGNOSIS — I48.20 CHRONIC ATRIAL FIBRILLATION (H): ICD-10-CM

## 2018-09-17 DIAGNOSIS — Z79.01 LONG-TERM (CURRENT) USE OF ANTICOAGULANTS: ICD-10-CM

## 2018-09-17 PROCEDURE — 99207 ZZC NO CHARGE NURSE ONLY: CPT | Performed by: INTERNAL MEDICINE

## 2018-09-17 NOTE — MR AVS SNAPSHOT
Malcolm HENDERSON [facility]   9/17/2018   Anticoagulation Therapy Visit    Description:  78 year old male   Provider:  Tod Callahan MD   Department:  [facility]           INR as of 9/17/2018     Today's INR 2.2 (9/13/2018)      Anticoagulation Summary as of 9/17/2018     INR goal 2.0-3.0   Today's INR 2.2 (9/13/2018)   Full warfarin instructions 2.5 mg on Wed, Sat; 5 mg all other days   Next INR check 10/11/2018    Indications   Long-term (current) use of anticoagulants [Z79.01] [Z79.01]  Chronic atrial fibrillation (H) [I48.2]         September 2018 Details    Sun Mon Tue Wed Thu Fri Sat           1                 2               3               4               5               6               7               8                 9               10               11               12               13               14               15                 16               17      5 mg   See details      18      5 mg         19      2.5 mg         20      5 mg         21      5 mg         22      2.5 mg           23      5 mg         24      5 mg         25      5 mg         26      2.5 mg         27      5 mg         28      5 mg         29      2.5 mg           30      5 mg                Date Details   09/17 This INR check               How to take your warfarin dose     To take:  2.5 mg Take 0.5 of a 5 mg tablet.    To take:  5 mg Take 1 of the 5 mg tablets.           October 2018 Details    Sun Mon Tue Wed Thu Fri Sat      1      5 mg         2      5 mg         3      2.5 mg         4      5 mg         5      5 mg         6      2.5 mg           7      5 mg         8      5 mg         9      5 mg         10      2.5 mg         11            12               13                 14               15               16               17               18               19               20                 21               22               23               24               25               26               27                 28                29               30               31                   Date Details   No additional details    Date of next INR:  10/11/2018         How to take your warfarin dose     To take:  2.5 mg Take 0.5 of a 5 mg tablet.    To take:  5 mg Take 1 of the 5 mg tablets.

## 2018-09-19 NOTE — PROGRESS NOTES
ANTICOAGULATION FOLLOW-UP CLINIC VISIT    Patient Name:  Malcolm Barker  Date:  9/19/2018  Contact Type:  Telephone    SUBJECTIVE:     Patient Findings     Positives No Problem Findings           OBJECTIVE    INR   Date Value Ref Range Status   09/13/2018 2.2  Final       ASSESSMENT / PLAN  INR assessment THER    Recheck INR In: 4 WEEKS    INR Location Outside lab      Anticoagulation Summary as of 9/17/2018     INR goal 2.0-3.0   Today's INR 2.2 (9/13/2018)   Warfarin maintenance plan 2.5 mg (5 mg x 0.5) on Wed, Sat; 5 mg (5 mg x 1) all other days   Full warfarin instructions 2.5 mg on Wed, Sat; 5 mg all other days   Weekly warfarin total 30 mg   No change documented Nany Moody RN   Plan last modified Mandi Phoenix RN (6/10/2016)   Next INR check 10/11/2018   Priority INR   Target end date Indefinite    Indications   Long-term (current) use of anticoagulants [Z79.01] [Z79.01]  Chronic atrial fibrillation (H) [I48.2]         Anticoagulation Episode Summary     INR check location     Preferred lab     Send INR reminders to [facility]    Comments 5mg tabs // APPT CARD // cell phone [phone]      Anticoagulation Care Providers     Provider Role Specialty Phone number    Tod Callahan MD  Internal Medicine [phone]            See the Encounter Report to view Anticoagulation Flowsheet and Dosing Calendar (Go to Encounters tab in chart review, and find the Anticoagulation Therapy Visit)        Nany Moody, RN

## 2018-10-23 ENCOUNTER — TRANSFERRED RECORDS (OUTPATIENT)
Dept: HEALTH INFORMATION MANAGEMENT | Facility: CLINIC | Age: 78
End: 2018-10-23

## 2018-11-01 ENCOUNTER — ANTICOAGULATION THERAPY VISIT (OUTPATIENT)
Dept: NURSING | Facility: CLINIC | Age: 78
End: 2018-11-01
Payer: COMMERCIAL

## 2018-11-01 DIAGNOSIS — I48.20 CHRONIC ATRIAL FIBRILLATION (H): ICD-10-CM

## 2018-11-01 DIAGNOSIS — Z79.01 LONG TERM CURRENT USE OF ANTICOAGULANT THERAPY: Primary | ICD-10-CM

## 2018-11-01 LAB — INR POINT OF CARE: 3 (ref 0.86–1.14)

## 2018-11-01 PROCEDURE — 85610 PROTHROMBIN TIME: CPT | Mod: QW

## 2018-11-01 PROCEDURE — 36416 COLLJ CAPILLARY BLOOD SPEC: CPT

## 2018-11-01 PROCEDURE — 99207 ZZC NO CHARGE NURSE ONLY: CPT

## 2018-11-01 NOTE — MR AVS SNAPSHOT
[facility]   11/1/2018 1:30 PM   Anticoagulation Therapy Visit    Description:  78 year old male   Provider:   [facility]ULATION CLINIC   Department:   Nurse           INR as of 11/1/2018     Today's INR 3.0      Anticoagulation Summary as of 11/1/2018     INR goal 2.0-3.0   Today's INR 3.0   Full warfarin instructions 2.5 mg on Wed, Sat; 5 mg all other days   Next INR check 12/13/2018    Indications   Long term current use of anticoagulant therapy [Z79.01]  Chronic atrial fibrillation (H) [I48.2]         Your next Anticoagulation Clinic appointment(s)     Dec 13, 2018  1:15 PM CST   Anticoagulation Visit with  ANTICOAGULATION CLINIC   [facility]    [address]  [address]  [place] [address] [phone]              Contact Numbers     [facility]  Please call  [phone] to cancel and/or reschedule your appointment   Please call  [phone] with any problems or questions regarding your therapy.        November 2018 Details    Sun Mon Tue Wed Thu Fri Sat         1      5 mg   See details      2      5 mg         3      2.5 mg           4      5 mg         5      5 mg         6      5 mg         7      2.5 mg         8      5 mg         9      5 mg         10      2.5 mg           11      5 mg         12      5 mg         13      5 mg         14      2.5 mg         15      5 mg         16      5 mg         17      2.5 mg           18      5 mg         19      5 mg         20      5 mg         21      2.5 mg         22      5 mg         23      5 mg         24      2.5 mg           25      5 mg         26      5 mg         27      5 mg         28      2.5 mg         29      5 mg         30      5 mg           Date Details   11/01 This INR check               How to take your warfarin dose     To take:  2.5 mg Take 0.5 of a 5 mg tablet.    To take:  5 mg Take 1 of the 5 mg tablets.           December 2018 Details    Sun Mon Tue Wed  Thu Fri Sat           1      2.5 mg           2      5 mg         3      5 mg         4      5 mg         5      2.5 mg         6      5 mg         7      5 mg         8      2.5 mg           9      5 mg         10      5 mg         11      5 mg         12      2.5 mg         13            14               15                 16               17               18               19               20               21               22                 23               24               25               26               27               28               29                 30               31                     Date Details   No additional details    Date of next INR:  12/13/2018         How to take your warfarin dose     To take:  2.5 mg Take 0.5 of a 5 mg tablet.    To take:  5 mg Take 1 of the 5 mg tablets.

## 2018-11-01 NOTE — PROGRESS NOTES
ANTICOAGULATION FOLLOW-UP CLINIC VISIT    Patient Name:  Malcolm Barker  Date:  11/1/2018  Contact Type:  Face to Face    SUBJECTIVE:     Patient Findings     Positives No Problem Findings           OBJECTIVE    INR Protime   Date Value Ref Range Status   11/01/2018 3.0 (A) 0.86 - 1.14 Final       ASSESSMENT / PLAN  INR assessment THER    Recheck INR In: 6 WEEKS    INR Location Clinic      Anticoagulation Summary as of 11/1/2018     INR goal 2.0-3.0   Today's INR 3.0   Warfarin maintenance plan 2.5 mg (5 mg x 0.5) on Wed, Sat; 5 mg (5 mg x 1) all other days   Full warfarin instructions 2.5 mg on Wed, Sat; 5 mg all other days   Weekly warfarin total 30 mg   No change documented Vianca Kent RN   Plan last modified Mandi Phoenix RN (6/10/2016)   Next INR check 12/13/2018   Priority INR   Target end date Indefinite    Indications   Long-term (current) use of anticoagulants [Z79.01] [Z79.01]  Chronic atrial fibrillation (H) [I48.2]         Anticoagulation Episode Summary     INR check location     Preferred lab     Send INR reminders to [facility]    Comments 5mg tabs // APPT CARD // cell phone [phone]      Anticoagulation Care Providers     Provider Role Specialty Phone number    Tod Callahan MD  Internal Medicine [phone]            See the Encounter Report to view Anticoagulation Flowsheet and Dosing Calendar (Go to Encounters tab in chart review, and find the Anticoagulation Therapy Visit)        Vianca Kent RN

## 2018-11-09 DIAGNOSIS — G60.9 IDIOPATHIC PERIPHERAL NEUROPATHY: ICD-10-CM

## 2018-11-09 RX ORDER — GABAPENTIN 100 MG/1
200 CAPSULE ORAL 2 TIMES DAILY
Qty: 360 CAPSULE | Refills: 1 | Status: SHIPPED | OUTPATIENT
Start: 2018-11-09 | End: 2019-04-08 | Stop reason: DRUGHIGH

## 2018-11-09 NOTE — TELEPHONE ENCOUNTER
Gabapentin 100 mg 2 caps am & 2 caps pm      Last Written Prescription Date:  3/30/18  Last Fill Quantity: 360,   # refills: 3  Last Office Visit: 3/30/18  Future Office visit:       Routing refill request to provider for review/approval because:  Drug not on the [facility], [facility] or [facility] refill protocol or controlled substance    Dorys, RN  Triage Nurse

## 2018-11-16 ENCOUNTER — OFFICE VISIT (OUTPATIENT)
Dept: PEDIATRICS | Facility: CLINIC | Age: 78
End: 2018-11-16
Payer: COMMERCIAL

## 2018-11-16 VITALS
DIASTOLIC BLOOD PRESSURE: 62 MMHG | HEART RATE: 69 BPM | WEIGHT: 209 LBS | OXYGEN SATURATION: 98 % | SYSTOLIC BLOOD PRESSURE: 106 MMHG | RESPIRATION RATE: 16 BRPM | TEMPERATURE: 97.5 F | BODY MASS INDEX: 28.35 KG/M2

## 2018-11-16 DIAGNOSIS — H10.31 ACUTE CONJUNCTIVITIS OF RIGHT EYE, UNSPECIFIED ACUTE CONJUNCTIVITIS TYPE: Primary | ICD-10-CM

## 2018-11-16 PROCEDURE — 99213 OFFICE O/P EST LOW 20 MIN: CPT | Performed by: INTERNAL MEDICINE

## 2018-11-16 RX ORDER — CIPROFLOXACIN HYDROCHLORIDE 3.5 MG/ML
1 SOLUTION/ DROPS TOPICAL 4 TIMES DAILY
Qty: 1.4 ML | Refills: 0 | Status: SHIPPED | OUTPATIENT
Start: 2018-11-16 | End: 2019-03-05

## 2018-11-16 NOTE — PROGRESS NOTES
SUBJECTIVE:   Malcolm Barker is a 78 year old male who presents to clinic today for the following health issues:      Eye(s) Problem  Onset: approx 3-4 weeks    Description:   Location: right  Pain: YES  Redness: YES    Accompanying Signs & Symptoms:  Discharge/mattering: YES  Swelling: YES  Visual changes: YES  Fever: no  Nasal Congestion: no  Bothered by bright lights: no    History:   Trauma: no   Foreign body exposure: no    Precipitating factors:   Wearing contacts: no    Alleviating factors:  Improved by: unknown    Therapies Tried and outcome: none      Problem list and histories reviewed & adjusted, as indicated.    Reviewed and updated as needed this visit by Provider  Tobacco  Allergies  Meds  Problems  Med Hx  Surg Hx  Fam Hx  Soc Hx            OBJECTIVE:     /62 (BP Location: Right arm, Patient Position: Chair, Cuff Size: Adult Regular)  Pulse 69  Temp 97.5  F (36.4  C) (Tympanic)  Resp 16  Wt 209 lb (94.8 kg)  SpO2 98%  BMI 28.35 kg/m2  Body mass index is 28.35 kg/(m^2).  GEN: no distress  HEENT: PERRL. EOMI. Right conjunctival hyperemia, lid erythema. Scant discharge. No nasal discharge. OP Moist.  NECK: Supple.       ASSESSMENT/PLAN:       ICD-10-CM    1. Acute conjunctivitis of right eye, unspecified acute conjunctivitis type H10.31 ciprofloxacin (CILOXAN) 0.3 % ophthalmic solution     Most likely conjunctivitis as the cause for sx.  Does have glaucoma and macular degeneration, not likely either as the cause but cannot r/o glaucoma.    Patient Instructions   Ciprofloxacin eye drop   1 drop into the right eye 4 times per day   Use until the redness and discharge stop, plus add 2 more days    If there is no change by Monday, call to set up an eye appointment     Tod Callahan MD  [facility]

## 2018-11-16 NOTE — MR AVS SNAPSHOT
After Visit Summary   11/16/2018    Malcolm HENDERSON [facility]    MRN: [id number]           Patient Information     Date Of Birth          1940        Visit Information        Provider Department      11/16/2018 3:20 PM Tod Callahan MD [facility]        Today's Diagnoses     Acute conjunctivitis of right eye, unspecified acute conjunctivitis type    -  1      Care Instructions    Ciprofloxacin eye drop   1 drop into the right eye 4 times per day   Use until the redness and discharge stop, plus add 2 more days    If there is no change by Monday, call to set up an eye appointment           Follow-ups after your visit        Your next 10 appointments already scheduled     Dec 13, 2018  1:15 PM CST   Anticoagulation Visit with  ANTICOAGULATION CLINIC   [facility]    [address] [place] [address]  [address]  [facility] [address] [phone]            Apr 08, 2019  1:20 PM CDT   PHYSICAL with Tod Callahan MD   [facility]    [address] [place] [address]  [address]  [facility] [id number] [phone]              Who to contact     If you have questions or need follow up information about today's clinic visit or your schedule please contact [facility] directly at [phone].  Normal or non-critical lab and imaging results will be communicated to you by MyChart, letter or phone within 4 business days after the clinic has received the results. If you do not hear from us within 7 days, please contact the clinic through [employer] or phone. If you have a critical or abnormal lab result, we will notify you by phone as soon as possible.  Submit refill requests through [employer] or call your pharmacy and they will forward the refill request to us. Please allow 3 business days for your refill to be completed.          Additional Information About Your Visit        [employer] Information     [employer] gives you  secure access to your electronic health record. If you see a primary care provider, you can also send messages to your care team and make appointments. If you have questions, please call your primary care clinic.  If you do not have a primary care provider, please call [phone] and they will assist you.        Care EveryWhere ID     This is your Care EveryWhere ID. This could be used by other organizations to access your [facility] medical records  [id number]        Your Vitals Were     Pulse Temperature Respirations Pulse Oximetry BMI (Body Mass Index)       69 97.5  F (36.4  C) (Tympanic) 16 98% 28.35 kg/m2        Blood Pressure from Last 3 Encounters:   11/16/18 106/62   06/01/18 98/58   03/30/18 112/63    Weight from Last 3 Encounters:   11/16/18 209 lb (94.8 kg)   06/01/18 200 lb (90.7 kg)   03/30/18 204 lb 6.4 oz (92.7 kg)              Today, you had the following     No orders found for display         Today's Medication Changes          These changes are accurate as of 11/16/18  3:47 PM.  If you have any questions, ask your nurse or doctor.               Start taking these medicines.        Dose/Directions    ciprofloxacin 0.3 % ophthalmic solution   Commonly known as:  CILOXAN   Used for:  Acute conjunctivitis of right eye, unspecified acute conjunctivitis type   Started by:  Tod Callahan MD        Dose:  1 drop   Place 1 drop into the right eye 4 times daily for 7 days   Quantity:  1.4 mL   Refills:  0         Stop taking these medicines if you haven't already. Please contact your care team if you have questions.     ciprofloxacin 500 MG tablet   Commonly known as:  CIPRO   Stopped by:  Tod Callahan MD           FLUZONE HIGH-DOSE 0.5 ML injection   Generic drug:  influenza Vac Split High-Dose   Stopped by:  Tod Callahan MD           glucosamine-chondroitin 500-400 MG Caps per capsule   Stopped by:  Tod Callahan MD           sildenafil 50 MG tablet   Commonly known as:  VIAGRA   Stopped by:   Tod Callahan MD                Where to get your medicines      These medications were sent to [facility]/pharmacy #[id number] - [place], [place] - [address] AT [address] [facility]  [address], [place] [address]     Phone:  [phone]     ciprofloxacin 0.3 % ophthalmic solution                Primary Care Provider Office Phone # Fax #    Tod Callahan [phone]       [address] [place] [address] [address] [place] [address]        Equal Access to Services     [facility]: Hadii aad ku hadasho Soomaali, waaxda luqadaha, qaybta kaalmada adeegyada, waxay idiin hayaan adeeg kharacarmenza latatyana . So [facility] [phone].    ATENCIÓN: Si habla español, tiene a espinosa disposición servicios gratuitos de asistencia lingüística. [facility] [phone].    We comply with applicable federal civil rights laws and [place] laws. We do not discriminate on the basis of race, color, national origin, age, disability, sex, sexual orientation, or gender identity.            Thank you!     Thank you for choosing [facility]  for your care. Our goal is always to provide you with excellent care. Hearing back from our patients is one way we can continue to improve our services. Please take a few minutes to complete the written survey that you may receive in the mail after your visit with us. Thank you!             Your Updated Medication List - Protect others around you: Learn how to safely use, store and throw away your medicines at www.disposemymeds.org.          This list is accurate as of 11/16/18  3:47 PM.  Always use your most recent med list.                   Brand Name Dispense Instructions for use Diagnosis    bromfenac sodium 0.09 % opthalmic solution    BROMDAY          bumetanide 1 MG tablet    BUMEX     Take 1 mg by mouth daily 2 in PM        ciprofloxacin 0.3 % ophthalmic solution    CILOXAN    1.4 mL    Place 1 drop into the right eye 4 times daily for 7 days    Acute conjunctivitis of right eye,  unspecified acute conjunctivitis type       CoQ10 200 MG Caps      Take by mouth daily        COREG 3.125 MG tablet   Generic drug:  carvedilol      Take 3.125 mg by mouth 2 times daily (with meals)        flaxseed oil 1000 MG Caps      1,000 mg        fluticasone 50 MCG/ACT spray    FLONASE    1 Package    Spray 1-2 sprays into both nostrils daily    Postnasal discharge       gabapentin 100 MG capsule    NEURONTIN    360 capsule    Take 2 capsules (200 mg) by mouth 2 times daily    Idiopathic peripheral neuropathy       GLUCOSAMINE CHONDR 1500 COMPLX PO      Take by mouth daily        LIPITOR 40 MG tablet   Generic drug:  atorvastatin      Take 40 mg by mouth daily        lisinopril 2.5 MG tablet    PRINIVIL/Zestril     Take 5 mg by mouth daily.        Magnesium Oxide 250 MG Tabs      Take by mouth daily        metoprolol succinate 25 MG 24 hr tablet    TOPROL-XL     Take 25 mg by mouth daily 2 tablets daily        MULTIVITAMIN PO      Take  by mouth.        ofloxacin 0.3 % ophthalmic solution    OCUFLOX          omeprazole 20 MG CR capsule    priLOSEC          PLAVIX 75 MG tablet   Generic drug:  clopidogrel      Take 1 tablet by mouth daily        potassium chloride 20 MEQ Packet    KLOR-CON     Take 20 mEq by mouth 2 times daily        potassium chloride SA 20 MEQ CR tablet    K-DUR/KLOR-CON M     Take 1 tablet twice daily. Take an additional 20 meq on days of Metolazone per [facility] E [facility] 9/17/14        ranitidine 150 MG tablet    ZANTAC    60 tablet    Take 1 tablet (150 mg) by mouth 2 times daily        sildenafil 20 MG tablet    REVATIO    30 tablet    2-5 tabs po QD prn    Erectile dysfunction, unspecified erectile dysfunction type       timolol 0.5 % ophthalmic solution    TIMOPTIC     Place 1 drop into the right eye daily        vitamin D 1000 units capsule      Take 1 capsule by mouth daily        warfarin 5 MG tablet    COUMADIN    80 tablet    Take a half tablet (2.5 mg) Wed and Sat; one tablet (5 mg)  Sun, Mon, Tu, Th, Fri OR as directed by INR Clinic    Chronic atrial fibrillation (H)       * XALATAN OP      Apply 1 drop to eye daily        * latanoprost 0.005 % ophthalmic solution    XALATAN          * Notice:  This list has 2 medication(s) that are the same as other medications prescribed for you. Read the directions carefully, and ask your doctor or other care provider to review them with you.

## 2018-11-16 NOTE — PATIENT INSTRUCTIONS
Ciprofloxacin eye drop   1 drop into the right eye 4 times per day   Use until the redness and discharge stop, plus add 2 more days    If there is no change by Monday, call to set up an eye appointment

## 2018-12-13 ENCOUNTER — ANTICOAGULATION THERAPY VISIT (OUTPATIENT)
Dept: NURSING | Facility: CLINIC | Age: 78
End: 2018-12-13
Payer: COMMERCIAL

## 2018-12-13 DIAGNOSIS — Z79.01 LONG TERM CURRENT USE OF ANTICOAGULANT THERAPY: ICD-10-CM

## 2018-12-13 DIAGNOSIS — I48.20 CHRONIC ATRIAL FIBRILLATION (H): ICD-10-CM

## 2018-12-13 LAB — INR POINT OF CARE: 2.3 (ref 0.86–1.14)

## 2018-12-13 PROCEDURE — 99207 ZZC NO CHARGE NURSE ONLY: CPT

## 2018-12-13 PROCEDURE — 85610 PROTHROMBIN TIME: CPT | Mod: QW

## 2018-12-13 PROCEDURE — 36416 COLLJ CAPILLARY BLOOD SPEC: CPT

## 2018-12-13 NOTE — PROGRESS NOTES
ANTICOAGULATION FOLLOW-UP CLINIC VISIT    Patient Name:  Malcolm Barker  Date:  2018  Contact Type:  Face to Face    SUBJECTIVE:     Patient Findings     Positives:   No Problem Findings    Comments:   Had fall 3 weeks ago.  Denied any concern with bruising.           OBJECTIVE    INR Protime   Date Value Ref Range Status   2018 2.3 (A) 0.86 - 1.14 Final       ASSESSMENT / PLAN  INR assessment THER    Recheck INR In: 6 WEEKS    INR Location Clinic      Anticoagulation Summary  As of 2018    INR goal:   2.0-3.0   TTR:   84.3 % (2.6 y)   INR used for dosin.3 (2018)   Warfarin maintenance plan:   2.5 mg (5 mg x 0.5) every Wed, Sat; 5 mg (5 mg x 1) all other days   Full warfarin instructions:   2.5 mg every Wed, Sat; 5 mg all other days   Weekly warfarin total:   30 mg   No change documented:   Katherin Gross RN   Plan last modified:   Mandi Phoenix RN (6/10/2016)   Next INR check:   2019   Priority:   INR   Target end date:   Indefinite    Indications    Long term current use of anticoagulant therapy [Z79.01]  Chronic atrial fibrillation (H) [I48.2]             Anticoagulation Episode Summary     INR check location:       Preferred lab:       Send INR reminders to:   [facility]    Comments:   5mg tabs // APPT CARD // cell phone [phone]      Anticoagulation Care Providers     Provider Role Specialty Phone number    Tod Callahan MD  Internal Medicine [phone]            See the Encounter Report to view Anticoagulation Flowsheet and Dosing Calendar (Go to Encounters tab in chart review, and find the Anticoagulation Therapy Visit)    Patient INR is therapeutic today.  Will continue weekly maintenance dose of 30 mg and follow up in 6 weeks or sooner if there are any concerns or problems.      Katherin Ann RN

## 2019-01-03 DIAGNOSIS — I48.20 CHRONIC ATRIAL FIBRILLATION (H): ICD-10-CM

## 2019-01-03 RX ORDER — WARFARIN SODIUM 5 MG/1
TABLET ORAL
Qty: 80 TABLET | Refills: 0 | Status: SHIPPED | OUTPATIENT
Start: 2019-01-03 | End: 2019-04-01

## 2019-01-22 ENCOUNTER — ANTICOAGULATION THERAPY VISIT (OUTPATIENT)
Dept: NURSING | Facility: CLINIC | Age: 79
End: 2019-01-22
Payer: COMMERCIAL

## 2019-01-22 DIAGNOSIS — I48.20 CHRONIC ATRIAL FIBRILLATION (H): ICD-10-CM

## 2019-01-22 DIAGNOSIS — Z79.01 LONG TERM CURRENT USE OF ANTICOAGULANT THERAPY: Primary | ICD-10-CM

## 2019-01-22 LAB — INR POINT OF CARE: 2.8 (ref 0.86–1.14)

## 2019-01-22 PROCEDURE — 36416 COLLJ CAPILLARY BLOOD SPEC: CPT

## 2019-01-22 PROCEDURE — 85610 PROTHROMBIN TIME: CPT | Mod: QW

## 2019-01-22 PROCEDURE — 99207 ZZC NO CHARGE NURSE ONLY: CPT

## 2019-01-22 NOTE — PROGRESS NOTES
ANTICOAGULATION FOLLOW-UP CLINIC VISIT    Patient Name:  Malcolm Barker  Date:  2019  Contact Type:  Face to Face    SUBJECTIVE:     Patient Findings     Positives:   No Problem Findings           OBJECTIVE    INR Protime   Date Value Ref Range Status   2019 2.8 (A) 0.86 - 1.14 Final       ASSESSMENT / PLAN  INR assessment THER    Recheck INR In: 6 WEEKS    INR Location Clinic      Anticoagulation Summary  As of 2019    INR goal:   2.0-3.0   TTR:   84.9 % (2.7 y)   INR used for dosin.8 (2019)   Warfarin maintenance plan:   2.5 mg (5 mg x 0.5) every Wed, Sat; 5 mg (5 mg x 1) all other days   Full warfarin instructions:   2.5 mg every Wed, Sat; 5 mg all other days   Weekly warfarin total:   30 mg   No change documented:   Vianca Kent RN   Plan last modified:   Mandi Phoenix RN (6/10/2016)   Next INR check:   3/5/2019   Priority:   INR   Target end date:   Indefinite    Indications    Long term current use of anticoagulant therapy [Z79.01]  Chronic atrial fibrillation (H) [I48.2]             Anticoagulation Episode Summary     INR check location:       Preferred lab:       Send INR reminders to:   [facility]    Comments:   5mg tabs // APPT CARD // cell phone [phone]      Anticoagulation Care Providers     Provider Role Specialty Phone number    Tod Callahan MD  Internal Medicine [phone]            See the Encounter Report to view Anticoagulation Flowsheet and Dosing Calendar (Go to Encounters tab in chart review, and find the Anticoagulation Therapy Visit)        Vianca Kent RN

## 2019-03-05 ENCOUNTER — ANTICOAGULATION THERAPY VISIT (OUTPATIENT)
Dept: NURSING | Facility: CLINIC | Age: 79
End: 2019-03-05
Payer: COMMERCIAL

## 2019-03-05 DIAGNOSIS — Z79.01 LONG TERM CURRENT USE OF ANTICOAGULANT THERAPY: Primary | ICD-10-CM

## 2019-03-05 DIAGNOSIS — I48.20 CHRONIC ATRIAL FIBRILLATION (H): ICD-10-CM

## 2019-03-05 LAB — INR POINT OF CARE: 2.2 (ref 0.86–1.14)

## 2019-03-05 PROCEDURE — 36416 COLLJ CAPILLARY BLOOD SPEC: CPT

## 2019-03-05 PROCEDURE — 85610 PROTHROMBIN TIME: CPT | Mod: QW

## 2019-03-05 NOTE — PROGRESS NOTES
ANTICOAGULATION FOLLOW-UP CLINIC VISIT    Patient Name:  Malcolm Barker  Date:  3/5/2019  Contact Type:  Face to Face    SUBJECTIVE:     Patient Findings     Positives:   No Problem Findings           OBJECTIVE    INR Protime   Date Value Ref Range Status   2019 2.2 (A) 0.86 - 1.14 Final       ASSESSMENT / PLAN  INR assessment THER    Recheck INR In: 6 WEEKS    INR Location Clinic      Anticoagulation Summary  As of 3/5/2019    INR goal:   2.0-3.0   TTR:   85.6 % (2.9 y)   INR used for dosin.2 (3/5/2019)   Warfarin maintenance plan:   2.5 mg (5 mg x 0.5) every Wed, Sat; 5 mg (5 mg x 1) all other days   Full warfarin instructions:   2.5 mg every Wed, Sat; 5 mg all other days   Weekly warfarin total:   30 mg   No change documented:   Vianca Kent RN   Plan last modified:   Mandi Phoenix RN (6/10/2016)   Next INR check:   2019   Priority:   INR   Target end date:   Indefinite    Indications    Long term current use of anticoagulant therapy [Z79.01]  Chronic atrial fibrillation (H) [I48.2]             Anticoagulation Episode Summary     INR check location:       Preferred lab:       Send INR reminders to:   [facility]    Comments:   5mg tabs // APPT CARD // cell phone [phone]      Anticoagulation Care Providers     Provider Role Specialty Phone number    Tod Callahan MD  Internal Medicine [phone]            See the Encounter Report to view Anticoagulation Flowsheet and Dosing Calendar (Go to Encounters tab in chart review, and find the Anticoagulation Therapy Visit)        Vianca Kent RN

## 2019-04-01 DIAGNOSIS — I48.20 CHRONIC ATRIAL FIBRILLATION (H): ICD-10-CM

## 2019-04-01 NOTE — TELEPHONE ENCOUNTER
"Requested Prescriptions   Pending Prescriptions Disp Refills     warfarin (COUMADIN) 5 MG tablet [Pharmacy Med Name: WARFARIN 5MG        TAB]  Last Written Prescription Date:  01/03/2019  Last Fill Quantity: 80 tablet,  # refills: 0   Last office visit: 11/16/2018 with prescribing provider:  Tod Callahan MD    Future Office Visit:   Next 5 appointments (look out 90 days)    Apr 08, 2019  1:20 PM CDT  PHYSICAL with Tod Callahan MD  [facility] [address] [place] [address]  [address]  [facility] [address]  [phone]          78 tablet 3     Sig: TAKE 1/2 (ONE-HALF) TABLET BY MOUTH ON WED, SATURDAYS  AND 1 (ONE) TAB ON SUN, MON, TUES, THURS AND FRIDAYS OR AS DIRECTED BY INR CLINIC    Vitamin K Antagonists Failed - 4/1/2019  5:31 AM       Failed - INR is within goal in the past 6 weeks    Confirm INR is within goal in the past 6 weeks.     Recent Labs   Lab Test 03/05/19  1335   INR 2.2*                      Passed - Recent (12 mo) or future (30 days) visit within the authorizing provider's specialty    Patient had office visit in the last 12 months or has a visit in the next 30 days with authorizing provider or within the authorizing provider's specialty.  See \"Patient Info\" tab in inbasket, or \"Choose Columns\" in Meds & Orders section of the refill encounter.             Passed - Medication is active on med list       Passed - Patient is 18 years of age or older          "

## 2019-04-04 RX ORDER — WARFARIN SODIUM 5 MG/1
TABLET ORAL
Qty: 78 TABLET | Refills: 3 | Status: SHIPPED | OUTPATIENT
Start: 2019-04-04 | End: 2020-03-31

## 2019-04-04 NOTE — TELEPHONE ENCOUNTER
Prescription approved per [facility], [facility] or [facility] refill protocol.  Katherin REBOLLEDO RN - Triage  [facility]

## 2019-04-07 ASSESSMENT — ENCOUNTER SYMPTOMS
JOINT SWELLING: 0
MYALGIAS: 0
FEVER: 0
WEAKNESS: 0
ARTHRALGIAS: 0
HEMATOCHEZIA: 0
SORE THROAT: 0
ABDOMINAL PAIN: 0
HEADACHES: 0
NAUSEA: 0
HEARTBURN: 0
DYSURIA: 0
EYE PAIN: 0
FREQUENCY: 0
PALPITATIONS: 0
DIARRHEA: 0
COUGH: 0
CHILLS: 0
SHORTNESS OF BREATH: 1
DIZZINESS: 0
NERVOUS/ANXIOUS: 0
PARESTHESIAS: 0
HEMATURIA: 0
CONSTIPATION: 0

## 2019-04-07 ASSESSMENT — ACTIVITIES OF DAILY LIVING (ADL): CURRENT_FUNCTION: NO ASSISTANCE NEEDED

## 2019-04-08 ENCOUNTER — OFFICE VISIT (OUTPATIENT)
Dept: PEDIATRICS | Facility: CLINIC | Age: 79
End: 2019-04-08
Payer: COMMERCIAL

## 2019-04-08 VITALS
BODY MASS INDEX: 29.39 KG/M2 | HEIGHT: 71 IN | DIASTOLIC BLOOD PRESSURE: 52 MMHG | SYSTOLIC BLOOD PRESSURE: 90 MMHG | OXYGEN SATURATION: 96 % | TEMPERATURE: 98.1 F | WEIGHT: 209.9 LBS | HEART RATE: 63 BPM

## 2019-04-08 DIAGNOSIS — N52.9 ERECTILE DYSFUNCTION, UNSPECIFIED ERECTILE DYSFUNCTION TYPE: ICD-10-CM

## 2019-04-08 DIAGNOSIS — I25.5 ISCHEMIC CARDIOMYOPATHY: ICD-10-CM

## 2019-04-08 DIAGNOSIS — G60.3 IDIOPATHIC PROGRESSIVE NEUROPATHY: ICD-10-CM

## 2019-04-08 DIAGNOSIS — I10 ESSENTIAL HYPERTENSION, BENIGN: ICD-10-CM

## 2019-04-08 DIAGNOSIS — Z00.00 ROUTINE GENERAL MEDICAL EXAMINATION AT A HEALTH CARE FACILITY: Primary | ICD-10-CM

## 2019-04-08 DIAGNOSIS — E78.5 HYPERLIPIDEMIA WITH TARGET LDL LESS THAN 100: ICD-10-CM

## 2019-04-08 DIAGNOSIS — I50.22 CHRONIC SYSTOLIC CONGESTIVE HEART FAILURE (H): ICD-10-CM

## 2019-04-08 PROCEDURE — G0439 PPPS, SUBSEQ VISIT: HCPCS | Performed by: INTERNAL MEDICINE

## 2019-04-08 RX ORDER — GABAPENTIN 300 MG/1
300 CAPSULE ORAL 2 TIMES DAILY
Qty: 180 CAPSULE | Refills: 3 | Status: SHIPPED | OUTPATIENT
Start: 2019-04-08 | End: 2020-06-11

## 2019-04-08 RX ORDER — VARDENAFIL HYDROCHLORIDE 20 MG/1
20 TABLET ORAL DAILY PRN
Qty: 10 TABLET | Refills: 5 | Status: SHIPPED | OUTPATIENT
Start: 2019-04-08 | End: 2020-11-02

## 2019-04-08 ASSESSMENT — ENCOUNTER SYMPTOMS
FEVER: 0
MYALGIAS: 0
WEAKNESS: 0
SORE THROAT: 0
ARTHRALGIAS: 0
NAUSEA: 0
CHILLS: 0
EYE PAIN: 0
COUGH: 0
JOINT SWELLING: 0
DIZZINESS: 0
HEMATOCHEZIA: 0
HEARTBURN: 0
DIARRHEA: 0
HEMATURIA: 0
PARESTHESIAS: 0
FREQUENCY: 0
SHORTNESS OF BREATH: 1
HEADACHES: 0
DYSURIA: 0
NERVOUS/ANXIOUS: 0
ABDOMINAL PAIN: 0
CONSTIPATION: 0
PALPITATIONS: 0

## 2019-04-08 ASSESSMENT — ACTIVITIES OF DAILY LIVING (ADL): CURRENT_FUNCTION: NO ASSISTANCE NEEDED

## 2019-04-08 ASSESSMENT — MIFFLIN-ST. JEOR: SCORE: 1698.19

## 2019-04-08 NOTE — PATIENT INSTRUCTIONS
Patient Education   Personalized Prevention Plan  You are due for the preventive services outlined below.  Your care team is available to assist you in scheduling these services.  If you have already completed any of these items, please share that information with your care team to update in your medical record.  Health Maintenance Due   Topic Date Due     Zoster (Shingles) Vaccine (2 of 3) 05/15/2013     Discuss Advance Directive Planning  03/14/2016     Heart Failure Action Plan Reviewed Every 3 Years  12/06/2016     INR CLINIC REFERRAL - yearly  04/21/2017     Basic Metabolic Lab - every 6 months  09/28/2018     Liver Monitoring Lab - yearly  03/28/2019     Cholesterol Lab - yearly  03/28/2019     Complete Blood Count Every Year  03/28/2019     Annual Wellness Visit  03/30/2019     FALL RISK ASSESSMENT  03/30/2019           [email or web address]

## 2019-04-08 NOTE — PROGRESS NOTES
"SUBJECTIVE:   Malcolm Barker is a 79 year old male who presents for Preventive Visit.    Are you in the first 12 months of your Medicare coverage?  No    Healthy Habits:     In general, how would you rate your overall health?  Good    Frequency of exercise:  6-7 days/week    Duration of exercise:  Greater than 60 minutes    Do you usually eat at least 4 servings of fruit and vegetables a day, include whole grains    & fiber and avoid regularly eating high fat or \"junk\" foods?  No    Taking medications regularly:  Yes    Ability to successfully perform activities of daily living:  No assistance needed    Home Safety:  No safety concerns identified    Hearing Impairment:  Difficulty following a conversation in a noisy restaurant or crowded room    In the past 6 months, have you been bothered by leaking of urine?  No    In general, how would you rate your overall mental or emotional health?  Good      PHQ-2 Total Score: 0    Additional concerns today:  Yes    Neuropathy sx. Mainly in the feet.     ED. Not fully helped by sildenafil.    Hearing loss. Notes in crowds.    ASCVD hx w/ HTN, hyperlipidemia and cardiomyopathy. Is followed by cardiology. Overall feeling well. No cardiac sx such as CP, palpitations, PND, orthopnea, ALFARO or peripheral edema.     GERD. Helped by omeprazole. No dysphagia sx.     Do you feel safe in your environment? Yes    Do you have a Health Care Directive? Yes: Patient states has Advance Directive and will bring in a copy to clinic.    Fall risk  Fallen 2 or more times in the past year?: No  Any fall with injury in the past year?: No  click delete button to remove this line now  Cognitive Screening   1) Repeat 3 items (Leader, Season, Table)    2) Clock draw: NORMAL  3) 3 item recall: Recalls 3 objects  Results: 3 items recalled: COGNITIVE IMPAIRMENT LESS LIKELY    Mini-CogTM Copyright S Pepper. Licensed by the author for use in [facility]; reprinted with permission " (marlene@[facility]). All rights reserved.      Do you have sleep apnea, excessive snoring or daytime drowsiness?: no      Reviewed and updated as needed this visit by Provider  Tobacco  Allergies  Meds  Problems  Med Hx  Surg Hx  Fam Hx        Social History     Tobacco Use     Smoking status: Former Smoker     Years: 3.00     Types: Cigarettes     Last attempt to quit: 1999     Years since quittin.0     Smokeless tobacco: Never Used     Tobacco comment: quit approx    Substance Use Topics     Alcohol use: Yes     Comment: 2 drinks daily     If you drink alcohol do you typically have >3 drinks per day or >7 drinks per week? No    Alcohol Use 2019   Prescreen: >3 drinks/day or >7 drinks/week? -   Prescreen: >3 drinks/day or >7 drinks/week? No   AUDIT SCORE  -       Current providers sharing in care for this patient include:   Patient Care Team:  Tod Callahan MD as PCP - General (Internal Medicine)  Tod Callahan MD as Assigned PCP    The following health maintenance items are reviewed in Epic and correct as of today:  Health Maintenance   Topic Date Due     ZOSTER IMMUNIZATION (2 of 3) 05/15/2013     ADVANCE DIRECTIVE PLANNING Q5 YRS  2016     HF ACTION PLAN Q3 YR  2016     OP ANNUAL INR REFERRAL  2017     BMP Q6 MOS  2018     ALT Q1 YR  2019     LIPID MONITORING Q1 YEAR  2019     CBC Q1 YR  2019     MEDICARE ANNUAL WELLNESS VISIT  2019     FALL RISK ASSESSMENT  2019     PHQ-2 Q1 YR  2020     DTAP/TDAP/TD IMMUNIZATION (4 - Td) 2024     INFLUENZA VACCINE  Completed     PNEUMOCOCCAL IMMUNIZATION 65+ LOW/MEDIUM RISK  Completed     IPV IMMUNIZATION  Aged Out     MENINGITIS IMMUNIZATION  Aged Out     Labs reviewed in EPIC    Review of Systems   Constitutional: Negative for chills and fever.   HENT: Positive for hearing loss. Negative for congestion, ear pain and sore throat.    Eyes: Negative for pain and visual disturbance.  "  Respiratory: Positive for shortness of breath. Negative for cough.    Cardiovascular: Negative for chest pain, palpitations and peripheral edema.   Gastrointestinal: Negative for abdominal pain, constipation, diarrhea, heartburn, hematochezia and nausea.   Genitourinary: Positive for impotence and urgency. Negative for discharge, dysuria, frequency, genital sores and hematuria.   Musculoskeletal: Negative for arthralgias, joint swelling and myalgias.   Skin: Negative for rash.   Neurological: Negative for dizziness, weakness, headaches and paresthesias.   Psychiatric/Behavioral: Negative for mood changes. The patient is not nervous/anxious.        OBJECTIVE:   BP 90/52 (BP Location: Right arm, Patient Position: Sitting, Cuff Size: Adult Regular)   Pulse 63   Temp 98.1  F (36.7  C) (Tympanic)   Ht 1.81 m (5' 11.25\")   Wt 95.2 kg (209 lb 14.4 oz)   SpO2 96%   BMI 29.07 kg/m   Estimated body mass index is 29.07 kg/m  as calculated from the following:    Height as of this encounter: 1.81 m (5' 11.25\").    Weight as of this encounter: 95.2 kg (209 lb 14.4 oz).  Physical Exam  GENERAL: healthy, alert and no distress  EYES: Eyes grossly normal to inspection, PERRL and conjunctivae and sclerae normal  HENT: ear canals and TM's normal, nose and mouth without ulcers or lesions  NECK: no adenopathy, no asymmetry, masses, or scars and thyroid normal to palpation  RESP: lungs clear to auscultation - no rales, rhonchi or wheezes  CV: regular rate and rhythm, normal S1 S2, no S3 or S4, no murmur, click or rub, no peripheral edema and peripheral pulses strong  ABDOMEN: soft, nontender, no hepatosplenomegaly, no masses and bowel sounds normal  MS: no gross musculoskeletal defects noted, no edema  SKIN: no suspicious lesions or rashes  NEURO: Normal strength and tone, mentation intact and speech normal  PSYCH: mentation appears normal, affect normal/bright      ASSESSMENT / PLAN:       ICD-10-CM    1. Routine general medical " "examination at a health care facility Z00.00    2. Ischemic cardiomyopathy I25.5    3. Hyperlipidemia with target LDL less than 100 E78.5    4. Chronic systolic congestive heart failure (H) I50.22    5. 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 Erectile dysfunction, unspecified erectile dysfunction type N52.9 vardenafil (LEVITRA) 20 MG tablet   7. Essential hypertension, benign I10        End of Life Planning:  Patient currently has an advanced directive: Yes.  Practitioner is supportive of decision.    COUNSELING:  Reviewed preventive health counseling, as reflected in patient instructions    BP Readings from Last 1 Encounters:   04/08/19 90/52     Estimated body mass index is 29.07 kg/m  as calculated from the following:    Height as of this encounter: 1.81 m (5' 11.25\").    Weight as of this encounter: 95.2 kg (209 lb 14.4 oz).           reports that he quit smoking about 20 years ago. His smoking use included cigarettes. He quit after 3.00 years of use. He has never used smokeless tobacco.      Appropriate preventive services were discussed with this patient, including applicable screening as appropriate for cardiovascular disease, diabetes, osteopenia/osteoporosis, and glaucoma.  As appropriate for age/gender, discussed screening for colorectal cancer, prostate cancer. Checklist reviewing preventive services available has been given to the patient.    Reviewed patients plan of care and provided an AVS. The Basic Care Plan (routine screening as documented in Health Maintenance) for Malcolm meets the Care Plan requirement. This Care Plan has been established and reviewed with the Patient.    Counseling Resources:  ATP IV Guidelines  Pooled Cohorts Equation Calculator  Breast Cancer Risk Calculator  FRAX Risk Assessment  ICSI Preventive Guidelines  Dietary Guidelines for Americans, 2010  USDA's MyPlate  ASA Prophylaxis  Lung CA Screening    Tod Callahan MD  [facility]    Identified Health Risks:

## 2019-04-09 ENCOUNTER — TRANSFERRED RECORDS (OUTPATIENT)
Dept: HEALTH INFORMATION MANAGEMENT | Facility: CLINIC | Age: 79
End: 2019-04-09

## 2019-04-16 ENCOUNTER — ANTICOAGULATION THERAPY VISIT (OUTPATIENT)
Dept: NURSING | Facility: CLINIC | Age: 79
End: 2019-04-16
Payer: COMMERCIAL

## 2019-04-16 DIAGNOSIS — Z79.01 LONG TERM CURRENT USE OF ANTICOAGULANT THERAPY: Primary | ICD-10-CM

## 2019-04-16 DIAGNOSIS — I48.20 CHRONIC ATRIAL FIBRILLATION (H): ICD-10-CM

## 2019-04-16 LAB — INR POINT OF CARE: 1.9 (ref 0.86–1.14)

## 2019-04-16 PROCEDURE — 85610 PROTHROMBIN TIME: CPT | Mod: QW

## 2019-04-16 PROCEDURE — 36416 COLLJ CAPILLARY BLOOD SPEC: CPT

## 2019-04-16 NOTE — LETTER
[address] [address]                  [phone]                       2019      To: [facility]  [address] [place] [address]  Fax: [phone]      Orders for Malcolm Barker     : 1940     He is being treated with warfarin for atrial fibrillation and ischemic cardiomyopathy and needs his INR checked as needed.       Draw INR every 2 to 6 weeks starting 2019 through 2019.     Fingerstick INR is acceptable.       Diag: Z79.01  Long-term (current) use of anticoagulants, I48.2 Chronic atrial fibrillation (H), I25.5 Ischemic cardiomyopathy            Fax result to:   [facility]LE [facility] or APPLE Pandey  [facility] (Fax: [phone]).  Phone number: [phone]     Please also give result to patient.              Thanks              Tod Callahan MD  UPIN# [id number]  [address] [address]  [phone]

## 2019-04-16 NOTE — PROGRESS NOTES
ANTICOAGULATION FOLLOW-UP CLINIC VISIT    Patient Name:  Malcolm Barker  Date:  2019  Contact Type:  Face to Face    SUBJECTIVE:     Patient Findings     Comments:   Patient denies: any missed doses,   increased intake of green vegetables,   medication changes, bleeding/bruising,   or signs/symptoms of a clot.             OBJECTIVE    INR Protime   Date Value Ref Range Status   2019 1.9 (A) 0.86 - 1.14 Final       ASSESSMENT / PLAN  INR assessment THER    Recheck INR In: 6 WEEKS    INR Location Clinic      Anticoagulation Summary  As of 2019    INR goal:   2.0-3.0   TTR:   84.8 % (3 y)   INR used for dosin.9! (2019)   Warfarin maintenance plan:   2.5 mg (5 mg x 0.5) every Wed, Sat; 5 mg (5 mg x 1) all other days   Full warfarin instructions:   2.5 mg every Wed, Sat; 5 mg all other days   Weekly warfarin total:   30 mg   No change documented:   Vianca Kent RN   Plan last modified:   Mandi Phoenix RN (6/10/2016)   Next INR check:   2019   Priority:   INR   Target end date:   Indefinite    Indications    Long term current use of anticoagulant therapy [Z79.01]  Chronic atrial fibrillation (H) [I48.2]             Anticoagulation Episode Summary     INR check location:       Preferred lab:       Send INR reminders to:   [facility]    Comments:   5mg tabs // APPT CARD // cell phone [phone]      Anticoagulation Care Providers     Provider Role Specialty Phone number    Tod Callahan MD  Internal Medicine [phone]            See the Encounter Report to view Anticoagulation Flowsheet and Dosing Calendar (Go to Encounters tab in chart review, and find the Anticoagulation Therapy Visit)        Vianca Kent RN

## 2019-04-25 ENCOUNTER — TRANSFERRED RECORDS (OUTPATIENT)
Dept: HEALTH INFORMATION MANAGEMENT | Facility: CLINIC | Age: 79
End: 2019-04-25

## 2019-04-25 LAB
ALT SERPL-CCNC: 19 U/L (ref 0–55)
AST SERPL-CCNC: 23 U/L (ref 10–40)
CHOLEST SERPL-MCNC: 136 MG/DL (ref 0–199)
CREAT SERPL-MCNC: 1.26 MG/DL (ref 0.73–1.18)
GFR SERPL CREATININE-BSD FRML MDRD: 54 ML/MIN/1.73M2
GLUCOSE SERPL-MCNC: 96 MG/DL (ref 70–100)
HDLC SERPL-MCNC: 43 MG/DL
LDLC SERPL CALC-MCNC: 78 MG/DL
NONHDLC SERPL-MCNC: 93 MG/DL
POTASSIUM SERPL-SCNC: 4.5 MMOL/L (ref 3.5–5.1)
TRIGL SERPL-MCNC: 74 MG/DL
TSH SERPL-ACNC: 1.31 UIU/ML (ref 0.3–4.5)

## 2019-05-06 ENCOUNTER — MYC MEDICAL ADVICE (OUTPATIENT)
Dept: PEDIATRICS | Facility: CLINIC | Age: 79
End: 2019-05-06

## 2019-05-06 DIAGNOSIS — R97.20 ELEVATED PROSTATE SPECIFIC ANTIGEN (PSA): Primary | ICD-10-CM

## 2019-05-09 DIAGNOSIS — R97.20 ELEVATED PROSTATE SPECIFIC ANTIGEN (PSA): ICD-10-CM

## 2019-05-09 PROCEDURE — 99000 SPECIMEN HANDLING OFFICE-LAB: CPT | Performed by: INTERNAL MEDICINE

## 2019-05-09 PROCEDURE — 36415 COLL VENOUS BLD VENIPUNCTURE: CPT | Performed by: INTERNAL MEDICINE

## 2019-05-09 PROCEDURE — 84154 ASSAY OF PSA FREE: CPT | Mod: 90 | Performed by: INTERNAL MEDICINE

## 2019-05-09 PROCEDURE — 84153 ASSAY OF PSA TOTAL: CPT | Mod: 90 | Performed by: INTERNAL MEDICINE

## 2019-05-10 LAB
PSA FREE MFR SERPL: 24 %
PSA FREE SERPL-MCNC: 0.9 NG/ML
PSA SERPL-MCNC: 3.7 NG/ML (ref 0–4)

## 2019-05-21 ENCOUNTER — TELEPHONE (OUTPATIENT)
Dept: PEDIATRICS | Facility: CLINIC | Age: 79
End: 2019-05-21

## 2019-05-21 NOTE — LETTER
[address] [address]                  [phone]                       May 21, 2019      To: [facility]  [address] [place] [address]  Fax: [phone]      Orders for Malcolm Barker     : 1940     He is being treated with warfarin for atrial fibrillation and ischemic cardiomyopathy and needs his INR checked as needed.       Draw INR every 2 to 6 weeks starting 2019 through 2019.     Fingerstick INR is acceptable.       Diag: Z79.01  Long-term (current) use of anticoagulants, I48.2 Chronic atrial fibrillation (H), I25.5 Ischemic cardiomyopathy            Fax result to:     [facility] (Fax: [phone]).  Phone number: [phone]     Please also give result to patient.              Thanks              Tod Callahan MD  UPIN# [id number]  [address] [address]  [phone]

## 2019-05-21 NOTE — TELEPHONE ENCOUNTER
Reason for call:  Other   Patient called regarding (reason for call): Note from Dr. Callahan  Additional comments: Patient called requesting a note from Dr. Callahan saying he can do his INR's at the [facility] in [place]. He would like to pick this note up at his visit on 5/30/19 when he comes in for his INR with the nurse.    Phone number to reach patient:  Home number on file [phone] (home)    Best Time:  Anytime     Can we leave a detailed message on this number?  YES

## 2019-05-30 ENCOUNTER — DOCUMENTATION ONLY (OUTPATIENT)
Dept: PEDIATRICS | Facility: CLINIC | Age: 79
End: 2019-05-30

## 2019-05-30 ENCOUNTER — ANTICOAGULATION THERAPY VISIT (OUTPATIENT)
Dept: NURSING | Facility: CLINIC | Age: 79
End: 2019-05-30
Payer: COMMERCIAL

## 2019-05-30 DIAGNOSIS — Z79.01 LONG TERM CURRENT USE OF ANTICOAGULANT THERAPY: Primary | ICD-10-CM

## 2019-05-30 DIAGNOSIS — I48.20 CHRONIC ATRIAL FIBRILLATION (H): ICD-10-CM

## 2019-05-30 LAB — INR POINT OF CARE: 2.3 (ref 0.86–1.14)

## 2019-05-30 PROCEDURE — 99207 ZZC NO CHARGE NURSE ONLY: CPT

## 2019-05-30 PROCEDURE — 85610 PROTHROMBIN TIME: CPT | Mod: QW

## 2019-05-30 PROCEDURE — 36416 COLLJ CAPILLARY BLOOD SPEC: CPT

## 2019-05-30 NOTE — PROGRESS NOTES
ANTICOAGULATION FOLLOW-UP CLINIC VISIT    Patient Name:  Malcolm Barker  Date:  2019  Contact Type:  Face to Face    SUBJECTIVE:  Patient Findings     Comments:   The patient was assessed for   diet, medication,   missed or extra doses,   bruising or bleeding,   with no problem findings.    He is going to [place] for the summer.  INRs will be done at [facility].  Order letter given to patient.        Clinical Outcomes     Comments:   The patient was assessed for   diet, medication,   missed or extra doses,   bruising or bleeding,   with no problem findings.    He is going to [place] for the summer.  INRs will be done at [facility].  Order letter given to patient.           OBJECTIVE    INR Protime   Date Value Ref Range Status   2019 2.3 (A) 0.86 - 1.14 Final       ASSESSMENT / PLAN  INR assessment THER    Recheck INR In: 6 WEEKS    INR Location Clinic      Anticoagulation Summary  As of 2019    INR goal:   2.0-3.0   TTR:   84.4 % (3.1 y)   INR used for dosin.3 (2019)   Warfarin maintenance plan:   2.5 mg (5 mg x 0.5) every Wed, Sat; 5 mg (5 mg x 1) all other days   Full warfarin instructions:   2.5 mg every Wed, Sat; 5 mg all other days   Weekly warfarin total:   30 mg   No change documented:   Vianca Kent, RN   Plan last modified:   Mandi Phoenix RN (6/10/2016)   Next INR check:   2019   Priority:   INR   Target end date:   Indefinite    Indications    Long term current use of anticoagulant therapy [Z79.01]  Chronic atrial fibrillation (H) [I48.2]             Anticoagulation Episode Summary     INR check location:       Preferred lab:       [facility]    Comments:   5mg tabs // APPT CARD // cell phone [phone]      Anticoagulation Care Providers     Provider Role Specialty Phone number    Tod Callahan MD  Internal Medicine [phone]            See the Encounter Report to view Anticoagulation Flowsheet and  Dosing Calendar (Go to Encounters tab in chart review, and find the Anticoagulation Therapy Visit)    INR is therapeutic today. Patient will continue same maintenance dose.   Follow up in 6 weeks in [place] or sooner if needed.        Vianca Kent RN

## 2019-05-30 NOTE — PROGRESS NOTES
Reimbursement rules require all INR Clinic patients to have a yearly renewal of an INR Clinic Referral order.  Referral must be signed by the PCP for each patient.      Referral is pended. Please complete and sign.     Vianca Kent RN

## 2019-06-13 ENCOUNTER — TRANSFERRED RECORDS (OUTPATIENT)
Dept: HEALTH INFORMATION MANAGEMENT | Facility: CLINIC | Age: 79
End: 2019-06-13

## 2019-06-13 LAB — COLOGUARD-ABSTRACT: NEGATIVE

## 2019-06-15 DIAGNOSIS — G60.9 IDIOPATHIC PERIPHERAL NEUROPATHY: ICD-10-CM

## 2019-06-15 NOTE — TELEPHONE ENCOUNTER
Requested Prescriptions   Pending Prescriptions Disp Refills     gabapentin (NEURONTIN) 100 MG capsule [Pharmacy Med Name: GABAPENTIN 100 MG CAPSULE]  Last Written Prescription Date:  04/08/2019  Last Fill Quantity: 180 capsule,  # refills: 3    Last Office Visit: 4/8/2019 Tod Callahan MD       Future Office Visit:      360 capsule 1     Sig: TAKE 2 CAPSULES (200 MG) BY MOUTH 2 TIMES DAILY       There is no refill protocol information for this order

## 2019-06-17 RX ORDER — GABAPENTIN 100 MG/1
200 CAPSULE ORAL 2 TIMES DAILY
Qty: 360 CAPSULE | Refills: 1 | OUTPATIENT
Start: 2019-06-17

## 2019-06-17 NOTE — TELEPHONE ENCOUNTER
Refills remaining at [facility] was advised to transfer script  Katherin REBOLLEDO RN - Triage  [facility]

## 2019-06-19 ENCOUNTER — TRANSFERRED RECORDS (OUTPATIENT)
Dept: HEALTH INFORMATION MANAGEMENT | Facility: CLINIC | Age: 79
End: 2019-06-19

## 2019-07-02 ENCOUNTER — TRANSFERRED RECORDS (OUTPATIENT)
Dept: HEALTH INFORMATION MANAGEMENT | Facility: CLINIC | Age: 79
End: 2019-07-02

## 2019-07-11 ENCOUNTER — ANTICOAGULATION THERAPY VISIT (OUTPATIENT)
Dept: NURSING | Facility: CLINIC | Age: 79
End: 2019-07-11
Payer: COMMERCIAL

## 2019-07-11 DIAGNOSIS — I48.20 CHRONIC ATRIAL FIBRILLATION (H): ICD-10-CM

## 2019-07-11 DIAGNOSIS — Z79.01 LONG TERM CURRENT USE OF ANTICOAGULANT THERAPY: Primary | ICD-10-CM

## 2019-07-11 LAB — INR POINT OF CARE: 3.2 (ref 0.86–1.14)

## 2019-07-11 PROCEDURE — 36416 COLLJ CAPILLARY BLOOD SPEC: CPT

## 2019-07-11 PROCEDURE — 85610 PROTHROMBIN TIME: CPT | Mod: QW

## 2019-07-11 PROCEDURE — 99207 ZZC NO CHARGE NURSE ONLY: CPT

## 2019-07-11 NOTE — PROGRESS NOTES
ANTICOAGULATION FOLLOW-UP CLINIC VISIT    Patient Name:  Malcolm Barker  Date:  7/11/2019  Contact Type:  Face to Face    SUBJECTIVE:  Patient Findings     Positives:   Change in alcohol use    Comments:   Patient denies: extra doses, illness, inflammation,   bleeding/bruises, medication changes, diet changes.    Increased alcohol intake in the past week.  Was drinking Pisco Sours. (drink popular in [place])    He is going to [place] for the summer.  INRs will be done at [facility].        Clinical Outcomes     Comments:   Patient denies: extra doses, illness, inflammation,   bleeding/bruises, medication changes, diet changes.    Increased alcohol intake in the past week.  Was drinking Pisco Sours. (drink popular in [place])    He is going to [place] for the summer.  INRs will be done at [facility].           OBJECTIVE    INR Protime   Date Value Ref Range Status   07/11/2019 3.2 (A) 0.86 - 1.14 Final       ASSESSMENT / PLAN  INR assessment SUPRA    Recheck INR In: 3 WEEKS    INR Location Clinic      Anticoagulation Summary  As of 7/11/2019    INR goal:   2.0-3.0   TTR:   84.2 % (3.2 y)   INR used for dosing:   3.2! (7/11/2019)   Warfarin maintenance plan:   2.5 mg (5 mg x 0.5) every Wed, Sat; 5 mg (5 mg x 1) all other days   Full warfarin instructions:   2.5 mg every Wed, Sat; 5 mg all other days   Weekly warfarin total:   30 mg   Plan last modified:   Mandi Phoenix RN (6/10/2016)   Next INR check:   8/13/2019   Priority:   INR   Target end date:   Indefinite    Indications    Long term current use of anticoagulant therapy [Z79.01]  Chronic atrial fibrillation (H) [I48.2]             Anticoagulation Episode Summary     INR check location:       Preferred lab:       Send INR reminders to:   [facility]    Comments:   5mg tabs // APPT CARD // cell phone [phone]      Anticoagulation Care Providers     Provider Role Specialty Phone number    Tod Callahan MD  Internal Medicine  [phone]            See the Encounter Report to view Anticoagulation Flowsheet and Dosing Calendar (Go to Encounters tab in chart review, and find the Anticoagulation Therapy Visit)    INR is supra therapeutic today.  Patient will continue maintenance dose.   Will follow up in 4 weeks or sooner if needed.    Dosage adjustment made based on physician directed care plan.      Vianca Kent RN

## 2019-07-15 ENCOUNTER — TELEPHONE (OUTPATIENT)
Dept: PEDIATRICS | Facility: CLINIC | Age: 79
End: 2019-07-15

## 2019-07-15 NOTE — TELEPHONE ENCOUNTER
Patient would like a call with his results from [facility].   Please call him at [phone]

## 2019-08-15 ENCOUNTER — ANTICOAGULATION THERAPY VISIT (OUTPATIENT)
Dept: NURSING | Facility: CLINIC | Age: 79
End: 2019-08-15
Payer: COMMERCIAL

## 2019-08-15 DIAGNOSIS — Z79.01 LONG TERM CURRENT USE OF ANTICOAGULANT THERAPY: Primary | ICD-10-CM

## 2019-08-15 DIAGNOSIS — I48.20 CHRONIC ATRIAL FIBRILLATION (H): ICD-10-CM

## 2019-08-15 LAB — INR POINT OF CARE: 2.6 (ref 0.86–1.14)

## 2019-08-15 PROCEDURE — 85610 PROTHROMBIN TIME: CPT | Mod: QW

## 2019-08-15 PROCEDURE — 99207 ZZC NO CHARGE NURSE ONLY: CPT

## 2019-08-15 PROCEDURE — 36416 COLLJ CAPILLARY BLOOD SPEC: CPT

## 2019-08-15 NOTE — PROGRESS NOTES
ANTICOAGULATION FOLLOW-UP CLINIC VISIT    Patient Name:  Malcolm Barker  Date:  8/15/2019  Contact Type:  Face to Face    SUBJECTIVE:  Patient Findings     Comments:   The patient was assessed for   diet, medication,   missed or extra doses,   bruising or bleeding,   with no problem findings.          Clinical Outcomes     Comments:   The patient was assessed for   diet, medication,   missed or extra doses,   bruising or bleeding,   with no problem findings.             OBJECTIVE    INR Protime   Date Value Ref Range Status   08/15/2019 2.6 (A) 0.86 - 1.14 Final       ASSESSMENT / PLAN  INR assessment THER    Recheck INR In: 6 WEEKS    INR Location Clinic      Anticoagulation Summary  As of 8/15/2019    INR goal:   2.0-3.0   TTR:   83.7 % (3.3 y)   INR used for dosin.6 (8/15/2019)   Warfarin maintenance plan:   2.5 mg (5 mg x 0.5) every Wed, Sat; 5 mg (5 mg x 1) all other days   Full warfarin instructions:   2.5 mg every Wed, Sat; 5 mg all other days   Weekly warfarin total:   30 mg   No change documented:   Vianca Kent RN   Plan last modified:   Mandi Phoenix RN (6/10/2016)   Next INR check:   2019   Priority:   INR   Target end date:   Indefinite    Indications    Long term current use of anticoagulant therapy [Z79.01]  Chronic atrial fibrillation (H) [I48.2]             Anticoagulation Episode Summary     INR check location:       Preferred lab:       Send INR reminders to:   [facility]NN[facility]    Comments:   5mg tabs // APPT CARD // cell phone [phone]      Anticoagulation Care Providers     Provider Role Specialty Phone number    Tod Callahan MD  Internal Medicine [phone]            See the Encounter Report to view Anticoagulation Flowsheet and Dosing Calendar (Go to Encounters tab in chart review, and find the Anticoagulation Therapy Visit)    INR is therapeutic today.   Patient will continue same maintenance dose.   Follow up in 6 weeks.        Vianca Kent RN

## 2019-08-23 ENCOUNTER — TRANSFERRED RECORDS (OUTPATIENT)
Dept: HEALTH INFORMATION MANAGEMENT | Facility: CLINIC | Age: 79
End: 2019-08-23

## 2019-09-16 ENCOUNTER — TELEPHONE (OUTPATIENT)
Dept: PEDIATRICS | Facility: CLINIC | Age: 79
End: 2019-09-16

## 2019-09-16 NOTE — TELEPHONE ENCOUNTER
[facility] stated that form was sent and faxed to them for gabapentin 300mg 180 tabs with 4 refills but directions were not placed.     Order given at this time.   gabapentin (NEURONTIN) 300 MG capsule     --   Sig - Route: Take 1 capsule (300 mg) by mouth 2 times daily - Oral               Jessica Shepherd RN Flex

## 2019-09-16 NOTE — TELEPHONE ENCOUNTER
[facility] called, asking for clarification on Rx received for patient's Gabapentin caps.  Caller asked for provider or RN to call [facility] at [phone], use reference # [id number], and ask for a pharmacist.  Thanks.

## 2019-09-25 ENCOUNTER — TRANSFERRED RECORDS (OUTPATIENT)
Dept: HEALTH INFORMATION MANAGEMENT | Facility: CLINIC | Age: 79
End: 2019-09-25

## 2019-09-26 ENCOUNTER — ANTICOAGULATION THERAPY VISIT (OUTPATIENT)
Dept: NURSING | Facility: CLINIC | Age: 79
End: 2019-09-26
Payer: COMMERCIAL

## 2019-09-26 DIAGNOSIS — Z79.01 LONG TERM CURRENT USE OF ANTICOAGULANT THERAPY: Primary | ICD-10-CM

## 2019-09-26 DIAGNOSIS — I48.20 CHRONIC ATRIAL FIBRILLATION (H): ICD-10-CM

## 2019-09-26 LAB — INR PPP: 2.7 (ref 0.9–1.1)

## 2019-09-26 PROCEDURE — 99207 ZZC NO CHARGE NURSE ONLY: CPT

## 2019-09-26 NOTE — PROGRESS NOTES
ANTICOAGULATION FOLLOW-UP     [facility]  Date:  [facility]  Contact Type:  Telephone/ Fax  Patient was in [place] for the summer and is still there.  Has his INRs done there at [facility].  Received a fax from [place] with INR result.  Warfarin dose and recheck instructions given to patient over the phone.   He verbalized understanding.      SUBJECTIVE:  Patient Findings     Comments:   The patient was assessed for   diet, medication,   missed or extra doses,   bruising or bleeding,   with no problem findings.          Clinical Outcomes     Comments:   The patient was assessed for   diet, medication,   missed or extra doses,   bruising or bleeding,   with no problem findings.             OBJECTIVE    INR   Date Value Ref Range Status   2019 2.7 (A) 0.9 - 1.1 Final       ASSESSMENT / PLAN  INR assessment THER    Recheck INR In: 6 WEEKS    INR Location Outside lab      Anticoagulation Summary  As of 2019    INR goal:   2.0-3.0   TTR:   84.2 % (3.4 y)   INR used for dosin.7 (2019)   Warfarin maintenance plan:   2.5 mg (5 mg x 0.5) every Wed, Sat; 5 mg (5 mg x 1) all other days   Full warfarin instructions:   2.5 mg every Wed, Sat; 5 mg all other days   Weekly warfarin total:   30 mg   No change documented:   Vianca Kent, RN   Plan last modified:   Mandi Phoenix RN (6/10/2016)   Next INR check:   2019   Priority:   INR   Target end date:   Indefinite    Indications    Long term current use of anticoagulant therapy [Z79.01]  Chronic atrial fibrillation (H) [I48.2]             Anticoagulation Episode Summary     INR check location:       Preferred lab:       Send INR reminders to:   JOHNNIE LACEY    Comments:   5mg tabs // APPT CARD // cell phone [phone]      Anticoagulation Care Providers     Provider Role Specialty Phone number    Tod Callahan MD  Internal Medicine [phone]            See the Encounter Report to view Anticoagulation  Flowsheet and Dosing Calendar (Go to Encounters tab in chart review, and find the Anticoagulation Therapy Visit)    INR is therapeutic today.   Patient will continue same maintenance dose.   Follow up in 6 weeks.        Vianca Kent RN

## 2019-10-01 ENCOUNTER — HEALTH MAINTENANCE LETTER (OUTPATIENT)
Age: 79
End: 2019-10-01

## 2019-10-03 ENCOUNTER — TRANSFERRED RECORDS (OUTPATIENT)
Dept: HEALTH INFORMATION MANAGEMENT | Facility: CLINIC | Age: 79
End: 2019-10-03

## 2019-10-29 ENCOUNTER — OFFICE VISIT (OUTPATIENT)
Dept: INTERNAL MEDICINE | Facility: CLINIC | Age: 79
End: 2019-10-29
Payer: COMMERCIAL

## 2019-10-29 VITALS
DIASTOLIC BLOOD PRESSURE: 60 MMHG | WEIGHT: 204.5 LBS | HEART RATE: 63 BPM | OXYGEN SATURATION: 99 % | TEMPERATURE: 98 F | SYSTOLIC BLOOD PRESSURE: 100 MMHG | HEIGHT: 71 IN | BODY MASS INDEX: 28.63 KG/M2

## 2019-10-29 DIAGNOSIS — I25.10 CORONARY ARTERY DISEASE INVOLVING NATIVE CORONARY ARTERY OF NATIVE HEART WITHOUT ANGINA PECTORIS: ICD-10-CM

## 2019-10-29 DIAGNOSIS — I48.20 CHRONIC ATRIAL FIBRILLATION (H): ICD-10-CM

## 2019-10-29 DIAGNOSIS — I50.22 CHRONIC SYSTOLIC CONGESTIVE HEART FAILURE (H): ICD-10-CM

## 2019-10-29 DIAGNOSIS — Z95.810 AICD (AUTOMATIC CARDIOVERTER/DEFIBRILLATOR) PRESENT: ICD-10-CM

## 2019-10-29 DIAGNOSIS — G60.3 IDIOPATHIC PROGRESSIVE NEUROPATHY: ICD-10-CM

## 2019-10-29 DIAGNOSIS — H40.9 GLAUCOMA OF RIGHT EYE, UNSPECIFIED GLAUCOMA TYPE: ICD-10-CM

## 2019-10-29 DIAGNOSIS — Z01.818 PREOP GENERAL PHYSICAL EXAM: Primary | ICD-10-CM

## 2019-10-29 DIAGNOSIS — Z79.01 LONG TERM CURRENT USE OF ANTICOAGULANT THERAPY: ICD-10-CM

## 2019-10-29 DIAGNOSIS — I25.5 ISCHEMIC CARDIOMYOPATHY: ICD-10-CM

## 2019-10-29 PROCEDURE — 99215 OFFICE O/P EST HI 40 MIN: CPT | Performed by: INTERNAL MEDICINE

## 2019-10-29 PROCEDURE — 99207 C PAF COMPLETED  NO CHARGE: CPT | Mod: 25 | Performed by: INTERNAL MEDICINE

## 2019-10-29 ASSESSMENT — MIFFLIN-ST. JEOR: SCORE: 1673.7

## 2019-10-29 NOTE — PROGRESS NOTES
[address] [address]  [phone]  Dept: [phone]    PRE-OP EVALUATION:  Today's date: 10/29/2019    Malcolm Barker (: 1940) presents for pre-operative evaluation assessment as requested by Dr. Bhupendra Phillips.  He requires evaluation and anesthesia risk assessment prior to undergoing surgery/procedure for treatment of glaucoma of RT eye.    Fax number for surgical facility: [phone]  Primary Physician: Tod Callahan  Type of Anesthesia Anticipated: to be determined    Patient has a Health Care Directive or Living Will:  YES     Preop Questions 10/29/2019   Who is doing your surgery? dr bhupendra phillips   What are you having done? trabecuectomy   Date of Surgery/Procedure: 2019   Facility or Hospital where procedure/surgery will be performed: [facility]   1.  Do you have a history of Heart attack, stroke, stent, coronary bypass surgery, or other heart surgery? YES  - anterior MI / PTCA/stent  &    2.  Do you ever have any pain or discomfort in your chest? No   3.  Do you have a history of  Heart Failure? YES  Ischemic cardiomyopathy since MI (EF 25%), AICD in place   4.   Are you troubled by shortness of breath when:  walking on a level surface, or up a slight hill, or at night? No   5.  Do you currently have a cold, bronchitis or other respiratory infection? No   6.  Do you have a cough, shortness of breath, or wheezing? No   7.  Do you sometimes get pains in the calves of your legs when you walk? No   8. Do you or anyone in your family have previous history of blood clots? No   9.  Do you or does anyone in your family have a serious bleeding problem such as prolonged bleeding following surgeries or cuts? No   10. Have you ever had problems with anemia or been told to take iron pills? No   11. Have you had any abnormal blood loss such as black, tarry or bloody stools? No   12. Have you ever had a  blood transfusion? YES    13. Have you or any of your relatives ever had problems with anesthesia? No   14. Do you have sleep apnea, excessive snoring or daytime drowsiness? No   15. Do you have any prosthetic heart valves? No   16. Do you have prosthetic joints? No         HPI:     HPI related to upcoming procedure: glaucoma right causing impaired vision        *  No recent infectious illnesses.    *  No recent cardiac or pulmonary issues or symptoms.    *  No problems performing vigorous physical activity, no changes in exercise tolerance.    *  No personal or family history of anesthesia complications.    *  No personal or family history of bleeding or clotting disorders.         Prior of coronary artery disease as listed in medical history.  No current or recent cardiovascaulr symptoms.   No shortness of breath, no episodes of chest pain/pressure, no dyspnea on exertion, no changes in his abiliy to perform physical exertion or tasks.  Takes the same amount of time to perform similar physical tasks, such as recently raking leaves and doing yard work [place] his cabin last month.      History of ventricular tachycardic arrest, ischemic cardiomyopathy.  Has an implantable defibrillator in place, has not fired.  history of ischemic cardiomyopathy, ejection fraction approximately 25% according to last echocardiogram.  Denies current signs and symptoms of congestive heart failure, denies orthopnea, denies PND, denies excessive lower extremity swelling.    Wt Readings from Last 10 Encounters:   10/29/19 92.8 kg (204 lb 8 oz)   04/08/19 95.2 kg (209 lb 14.4 oz)   11/16/18 94.8 kg (209 lb)   06/01/18 90.7 kg (200 lb)   03/30/18 92.7 kg (204 lb 6.4 oz)   01/23/18 92.1 kg (203 lb)   01/08/18 93.4 kg (206 lb)   10/30/17 93 kg (205 lb)   04/05/17 90.7 kg (200 lb)   03/29/16 90.7 kg (200 lb)       Has history of atrial fibrillation.    No palpitations, no dizziness, no dyspnea on exertion, no shortness of breath.  No racing heart,  no fast heart rates.    Taking medications as ordered, no side effects reported.   Patient is taking anticoagulation (Warfarin) according to direction, with no reported side effects.       MEDICAL HISTORY:     Patient Active Problem List    Diagnosis Date Noted     Ischemic cardiomyopathy 04/26/2018     Priority: Medium     Hyperlipidemia with target LDL less than 100 03/27/2018     Priority: Medium     Idiopathic progressive neuropathy 04/05/2017     Priority: Medium     Long term current use of anticoagulant therapy 04/18/2016     Priority: Medium     Chronic atrial fibrillation 04/15/2016     Priority: Medium     AICD (automatic cardioverter/defibrillator) present 02/07/2014     Priority: Medium     Chronic systolic congestive heart failure (H) 10/07/2013     Priority: Medium     Coronary artery disease involving native coronary artery of native heart without angina pectoris 10/07/2013     Priority: Medium     Old myocardial infarction 10/07/2013     Priority: Medium     Health Care Home 01/29/2013     Priority: Medium     Melia Kwok RN-BSN, [facility]  [phone].  FPA / [facility]       DX V65.8 REPLACED WITH [id number] HEALTH CARE HOME (04/08/2013)       Advanced directives, counseling/discussion 03/14/2011     Priority: Medium     Discussed Advance Directive planning with patient; information given to patient to review.Elzbieta Lange MA         CARDIOVASCULAR SCREENING; LDL GOAL LESS THAN 130 10/31/2010     Priority: Medium     Hypertrophy of prostate with urinary obstruction 03/14/2006     Priority: Medium     Problem list name updated by automated process. Provider to review       Essential hypertension, benign 10/29/2002     Priority: Medium     Generalized osteoarthrosis, unspecified site 10/29/2002     Priority: Medium     Esophageal reflux 10/29/2002     Priority: Medium      Past Medical History:   Diagnosis Date     Contracture of palmar fascia     both hands, mild      Esophageal reflux      Essential hypertension, benign      Generalized osteoarthrosis, unspecified site     L shoulder, r hip..  improved now with exercise, glucosamine     Glaucoma      Hernia, abdominal      Hypertrophy (benign) of prostate     mild slowing     Past Surgical History:   Procedure Laterality Date     C NONSPECIFIC PROCEDURE  5/04    colonoscopy      C NONSPECIFIC PROCEDURE  1993    R hernia     [facility]     COLONOSCOPY  4/24/2013    Procedure: COLONOSCOPY;  Colonoscopy        ENT SURGERY      T&A as a child     EYE SURGERY      lright eye lens replacement     HERNIA REPAIR       IMPLANT AUTOMATIC IMPLANTABLE CARDIOVERTER DEFIBRILLATOR  2/2014     Current Outpatient Medications   Medication Sig Dispense Refill     atorvastatin (LIPITOR) 40 MG tablet Take 40 mg by mouth daily       Bromfenac Sodium (BROMDAY) 0.09 % SOLN        bumetanide (BUMEX) 1 MG tablet Take 1 mg by mouth daily 2 in PM       carvedilol (COREG) 3.125 MG tablet Take 3.125 mg by mouth 2 times daily (with meals)       Cholecalciferol (VITAMIN D) 1000 UNITS capsule Take 1 capsule by mouth daily       clopidogrel (PLAVIX) 75 MG tablet Take 1 tablet by mouth daily       Coenzyme Q10 (COQ10) 200 MG CAPS Take by mouth daily       fluticasone (FLONASE) 50 MCG/ACT nasal spray Spray 1-2 sprays into both nostrils daily 1 Package 11     gabapentin (NEURONTIN) 300 MG capsule Take 1 capsule (300 mg) by mouth 2 times daily 180 capsule 3     Glucosamine-Chondroit-Vit C-Mn (GLUCOSAMINE CHONDR 1500 COMPLX PO) Take by mouth daily       latanoprost (XALATAN) 0.005 % ophthalmic solution        lisinopril (PRINIVIL,ZESTRIL) 2.5 MG tablet Take 5 mg by mouth daily.        Magnesium Oxide 250 MG TABS Take by mouth daily       metoprolol (TOPROL-XL) 25 MG 24 hr tablet Take 25 mg by mouth daily 2 tablets daily       Multiple Vitamin (MULTIVITAMIN OR) Take  by mouth.       ofloxacin (OCUFLOX) 0.3 % ophthalmic solution         omeprazole (PRILOSEC) 20 MG CR capsule        potassium chloride (KLOR-CON) 20 MEQ packet Take 20 mEq by mouth 2 times daily       ranitidine (ZANTAC) 150 MG tablet Take 1 tablet (150 mg) by mouth 2 times daily 60 tablet 1     timolol (TIMOPTIC) 0.5 % ophthalmic solution Place 1 drop into the right eye daily       vardenafil (LEVITRA) 20 MG tablet Take 1 tablet (20 mg) by mouth daily as needed (ED) 10 tablet 5     warfarin (COUMADIN) 5 MG tablet TAKE 1/2 (ONE-HALF) TABLET BY MOUTH ON WED,   AND 1 (ONE) TAB ON SUN, MON, TUES, THURS AND  OR AS DIRECTED BY INR CLINIC 78 tablet 3     Latanoprost (XALATAN OP) Apply 1 drop to eye daily       potassium chloride SA (K-DUR/KLOR-CON M) 20 MEQ CR tablet Take 1 tablet twice daily. Take an additional 20 meq on days of Metolazone per Maria E Johnson 14       sildenafil (REVATIO) 20 MG tablet 2-5 tabs po QD prn 30 tablet 11     OTC products: None, except as noted above and no recent use of OTC ASA, NSAIDS or Steroids    Allergies   Allergen Reactions     No Known Allergies       Latex Allergy: NO    Social History     Tobacco Use     Smoking status: Former Smoker     Years: 3.00     Types: Cigarettes     Last attempt to quit: 1999     Years since quittin.5     Smokeless tobacco: Never Used     Tobacco comment: quit approx    Substance Use Topics     Alcohol use: Yes     Comment: 2 drinks daily     History   Drug Use No       REVIEW OF SYSTEMS:   CONSTITUTIONAL: NEGATIVE for fever, chills, change in weight  INTEGUMENTARY/SKIN: NEGATIVE for worrisome rashes, moles or lesions  EYES: NEGATIVE for vision changes or irritation  ENT/MOUTH: NEGATIVE for ear, mouth and throat problems  RESP: NEGATIVE for significant cough or SOB  BREAST: NEGATIVE for masses, tenderness or discharge  CV: NEGATIVE for chest pain, palpitations or peripheral edema  GI: NEGATIVE for nausea, abdominal pain, heartburn, or change in bowel habits  : NEGATIVE for frequency,  "dysuria, or hematuria  MUSCULOSKELETAL: NEGATIVE for significant arthralgias or myalgia  NEURO: NEGATIVE for weakness, dizziness; POS for chronic idiopathic peripheral neuropathy  ENDOCRINE: NEGATIVE for temperature intolerance, skin/hair changes  HEME: NEGATIVE for bleeding problems  PSYCHIATRIC: NEGATIVE for changes in mood or affect    EXAM:   /60   Pulse 63   Temp 98  F (36.7  C) (Temporal)   Ht 1.81 m (5' 11.25\")   Wt 92.8 kg (204 lb 8 oz)   SpO2 99%   BMI 28.32 kg/m    GENERAL alert and no distress  EYES:  Normal sclera,conjunctiva, EOMI  HENT: oral and posterior pharynx without lesions or erythema, facies symmetric  NECK: Neck supple. No LAD, without thyroidmegaly.  RESP: Clear to ausculation bilaterally without wheezes or crackles. Normal BS in all fields.  CV: Irregular rhythm ( consistent with known atrial fibrillation), regular rate; normal S1S2 without murmurs, rubs or gallops   LYMPH: no cervical lymph adenopathy appreciated  MS: extremities- no gross deformities of the visible extremities noted,   EXT:  no lower extremity edema  PSYCH: Alert and oriented times 3; speech- coherent  SKIN:  No obvious significant skin lesions on visible portions of face     DIAGNOSTICS:   No labs or EKG required for low risk surgery (cataract, skin procedure, breast biopsy, etc)    Recent Labs   Lab Test 09/25/19  0915 08/15/19  0953  04/25/19 03/28/18  0843  01/23/18  1127  04/05/17  0851   HGB  --   --   --   14.2  --  14.7  --   --   --  15.0   PLT  --   --   --  195  --  198  --   --   --  218   INR 2.7* 2.6*   < >  --    < >  --    < >  --    < >  --    NA  --   --   --   -- 138  --  139  --  138  --  141   POTASSIUM  --   --   --  4.5  --  4.3  --  4.5  --  5.0   CR  --   --   --  1.26*  --  1.24  --  1.24  --  1.29*   A1C  --   --   --   --   --   --   --   --   --  5.6    < > = values in this interval not displayed.        IMPRESSION:   Reason for surgery/procedure: glaucoma right " eye    Diagnosis/reason for consult:     1. Preop general physical exam    2. Glaucoma of right eye, unspecified glaucoma type    3. AICD (automatic cardioverter/defibrillator) present    4. Coronary artery disease involving native coronary artery of native heart without angina pectoris    5. Chronic systolic congestive heart failure (H)    6. Ischemic cardiomyopathy    7. Idiopathic progressive neuropathy    8. Long term current use of anticoagulant therapy    9. Chronic atrial fibrillation         The proposed surgical procedure is considered LOW risk.    REVISED CARDIAC RISK INDEX  The patient has the following serious cardiovascular risks for perioperative complications such as (MI, PE, VFib and 3  AV Block):  --Coronary Artery Disease (currently stable, no active cardiac symptoms)  --ischemic cardiomyopathy (currently stable, no recent CHF sx)  --history of ventricular tachycardia, AICD in place.          ICD-10-CM    1. Preop general physical exam Z01.818    2. Glaucoma of right eye, unspecified glaucoma type H40.9    3. AICD (automatic cardioverter/defibrillator) present Z95.810    4. Coronary artery disease involving native coronary artery of native heart without angina pectoris I25.10    5. Chronic systolic congestive heart failure (H) I50.22    6. Ischemic cardiomyopathy I25.5    7. Idiopathic progressive neuropathy G60.3    8. Long term current use of anticoagulant therapy Z79.01    9. Chronic atrial fibrillation I48.20        RECOMMENDATIONS:       Cardiovascular Risk  Performs 4 METs exercise without symptoms (Light housework (dusting, washing dishes), Climb a flight of stairs and Walk on level ground at 15 minutes per mile (4 miles/hour)) .   Patient is already on a Beta Blocker. Continue Betablocker therapy after surgery, using Beta blocker order set as necessary for NPO status.      Pulmonary Risk  No specific pulmonary risk factors identified     Anticoagulant or Antiplatelet Medication Use  PLAVIX:  No contraindication to stopping plavix.  Stop 7-10 days prior to surgery  WARFARIN: Thromboembolic risk is low, hold warfarin 5 days (INR >/= 2) without bridging before procedure.      *  Take all scheduled medications on the morning of surgery.      APPROVAL GIVEN to proceed with proposed procedure, without further diagnostic evaluation       Signed Electronically by: Mk Tomlinson MD    Copy of this evaluation report is provided to requesting physician.    [facility] Preop Guidelines    Revised Cardiac Risk Index

## 2019-10-29 NOTE — PATIENT INSTRUCTIONS
PRE-OPERATIVE INSTRUCTIONS:     *  Contact your surgeon if there is any change in your health. This includes signs of fection, such as cold or flu (such as a sore throat, runny nose, cough, rash or fever).    *  Stop Plavix for 7 days before procedure.  (Resume after surgery. )    *  Stop coumain (warfarin) 5 days before surgery (resuem after surgery)    *  No NSAIDs (Motrin, Advil, ibuprofen, Aleve, etc) within 5-7 days of surgery.    *  Tylenol (acetaminophen) OK to take if needed for pain or headache.  Follow instructions on the bottle    *  Stop any Fish Oil or vitamin E supplements for 7 days prior to surgery because these can affect platelet function.      *  Prepare your body as instructed by the surgery clinic.  If instructed, Take a shower or bath the night before surgery. Use any special soaps or cleaning instructions according to instructions from your surgeon.  If you do not have soap from your surgeon, use your regular soap. Do not shave or scrub the surgery site.  Wear clean pajamas and have clean sheets on your bed     *  ON THE MORNING OF SURGERY:     --Take all usual medication with small sip of water.       *  Resume all medications at the same doses after surgery, unless instructed otherwise by the medical staff.     *  Attend all follow up appointments with the surgeons (and/or therapists if applicable) as instructed.     *  Contact the surgeon's office for any specific questions about after-surgery cares and follow up instructions.

## 2019-10-29 NOTE — NURSING NOTE
"Chief Complaint   Patient presents with     Pre-Op Exam     /60   Pulse 63   Temp 98  F (36.7  C) (Temporal)   Ht 1.81 m (5' 11.25\")   Wt 92.8 kg (204 lb 8 oz)   SpO2 99%   BMI 28.32 kg/m   Estimated body mass index is 28.32 kg/m  as calculated from the following:    Height as of this encounter: 1.81 m (5' 11.25\").    Weight as of this encounter: 92.8 kg (204 lb 8 oz).  Medication Reconciliation: complete      Health Maintenance that is potentially due pending provider review:  NONE    n/a    MATTHEW Means  "

## 2019-11-08 ENCOUNTER — ANTICOAGULATION THERAPY VISIT (OUTPATIENT)
Dept: NURSING | Facility: CLINIC | Age: 79
End: 2019-11-08
Payer: COMMERCIAL

## 2019-11-08 DIAGNOSIS — Z79.01 LONG TERM CURRENT USE OF ANTICOAGULANT THERAPY: Primary | ICD-10-CM

## 2019-11-08 DIAGNOSIS — I48.20 CHRONIC ATRIAL FIBRILLATION (H): ICD-10-CM

## 2019-11-08 LAB — INR POINT OF CARE: 1.5 (ref 0.86–1.14)

## 2019-11-08 PROCEDURE — 99207 ZZC NO CHARGE NURSE ONLY: CPT

## 2019-11-08 PROCEDURE — 85610 PROTHROMBIN TIME: CPT | Mod: QW

## 2019-11-08 PROCEDURE — 36416 COLLJ CAPILLARY BLOOD SPEC: CPT

## 2019-11-08 NOTE — PROGRESS NOTES
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  Stopped taking warfarin on Wed.    INR is subtherapeutic today due to a hold for surgery.   Patient will continue holding dose until after surgery,   then resume maintenance dose.   Follow up in 2 weeks.         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  Stopped taking warfarin on Wed.    INR is subtherapeutic today due to a hold for surgery.   Patient will continue holding dose until after surgery,   then resume maintenance dose.   Follow up in 2 weeks.            OBJECTIVE    INR Protime   Date Value Ref Range Status   2019 1.5 (A) 0.86 - 1.14 Final       ASSESSMENT / PLAN  INR assessment SUB    Recheck INR In: 2 WEEKS    INR Location Clinic      Anticoagulation Summary  As of 2019    INR goal:   2.0-3.0   TTR:   83.3 % (3.5 y)   INR used for dosin.5! (2019)   Warfarin maintenance plan:   2.5 mg (5 mg x 0.5) every Wed, Sat; 5 mg (5 mg x 1) all other days   Full warfarin instructions:   : Hold; : Hold; 11/10: Hold; : Hold; : Hold; Otherwise 2.5 mg every Wed, Sat; 5 mg all other days   Weekly warfarin total:   30 mg   Plan last modified:   Mandi Phoenix RN (6/10/2016)   Next INR check:   2019   Priority:   INR   Target end date:   Indefinite    Indications    Long term current use of anticoagulant therapy [Z79.01]  Chronic atrial fibrillation [I48.20]             Anticoagulation Episode Summary     INR check location:       Preferred lab:       Send INR reminders to:   JOHNNIE LACEY    Comments:   5mg tabs  // [employer] CARD // cell phone [phone]      Anticoagulation Care Providers     Provider Role Specialty Phone number    Tod Callahan MD  Internal Medicine [phone]            See the Encounter Report to view Anticoagulation Flowsheet and Dosing Calendar (Go to Encounters tab in chart review, and find the Anticoagulation Therapy Visit)    INR is subtherapeutic today due to a hold for surgery.   Patient will continue holding dose until after surgery,   then resume maintenance dose.   Follow up in 2 weeks.     Dosage adjustment made based on physician directed care plan.      Vianca Kent RN

## 2019-11-25 ENCOUNTER — ANTICOAGULATION THERAPY VISIT (OUTPATIENT)
Dept: NURSING | Facility: CLINIC | Age: 79
End: 2019-11-25
Payer: COMMERCIAL

## 2019-11-25 DIAGNOSIS — I48.20 CHRONIC ATRIAL FIBRILLATION (H): ICD-10-CM

## 2019-11-25 DIAGNOSIS — Z79.01 LONG TERM CURRENT USE OF ANTICOAGULANT THERAPY: Primary | ICD-10-CM

## 2019-11-25 LAB — INR POINT OF CARE: 1.6 (ref 0.86–1.14)

## 2019-11-25 PROCEDURE — 36416 COLLJ CAPILLARY BLOOD SPEC: CPT

## 2019-11-25 PROCEDURE — 85610 PROTHROMBIN TIME: CPT | Mod: QW

## 2019-11-25 NOTE — PROGRESS NOTES
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    Patient denies: any missed doses (except for hold),   increased intake of green vegetables,   medication changes, bleeding/bruising,   or signs/symptoms of a clot.    He will be out of town  thru 19.    INR is subtherapeutic today.   Patient will take 7.5 mg today and tomorrow   which will increase weekly total   by 16.7%, then resume maintenance dose.   Follow up in 3 weeks.         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    Patient denies: any missed doses (except for hold),   increased intake of green vegetables,   medication changes, bleeding/bruising,   or signs/symptoms of a clot.    He will be out of town  thru 19.    INR is subtherapeutic today.   Patient will take 7.5 mg today and tomorrow   which will increase weekly total   by 16.7%, then resume maintenance dose.   Follow up in 3 weeks.            OBJECTIVE    INR Protime   Date Value Ref Range Status   2019 1.6 (A) 0.86 - 1.14 Final       ASSESSMENT / PLAN  INR assessment SUB    Recheck INR In: 3 WEEKS    INR Location Clinic      Anticoagulation Summary  As of 2019    INR goal:   2.0-3.0   TTR:   77.8 % (1 y)   INR used for dosin.6! (2019)   Warfarin maintenance plan:   2.5 mg (5 mg x 0.5) every Wed, Sat; 5 mg (5 mg x 1) all other days   Full warfarin instructions:   : 7.5 mg; : 7.5 mg; Otherwise 2.5 mg every Wed, Sat; 5 mg all other days   Weekly warfarin total:   30 mg   Plan last modified:   Mandi Phoenix RN (6/10/2016)   Next INR check:   2019   Priority:   Maintenance   Target end date:   Indefinite    Indications    Long term current use of  anticoagulant therapy [Z79.01]  Chronic atrial fibrillation [I48.20]             Anticoagulation Episode Summary     INR check location:       Preferred lab:       Send INR reminders to:   JOHNNIE LACEY    Comments:   5mg tabs // APPT CARD // cell phone [phone]      Anticoagulation Care Providers     Provider Role Specialty Phone number    Tod Callahan MD  Internal Medicine [phone]            See the Encounter Report to view Anticoagulation Flowsheet and Dosing Calendar (Go to Encounters tab in chart review, and find the Anticoagulation Therapy Visit)    INR is subtherapeutic today.   Patient will take 7.5 mg today and tomorrow   which will increase weekly total   by 16.7%, then resume maintenance dose.   Follow up in 3 weeks.     Dosage adjustment made based on physician directed care plan.      Vianca Ketn RN

## 2019-12-12 ENCOUNTER — TRANSFERRED RECORDS (OUTPATIENT)
Dept: HEALTH INFORMATION MANAGEMENT | Facility: CLINIC | Age: 79
End: 2019-12-12

## 2019-12-19 ENCOUNTER — ANTICOAGULATION THERAPY VISIT (OUTPATIENT)
Dept: NURSING | Facility: CLINIC | Age: 79
End: 2019-12-19
Payer: COMMERCIAL

## 2019-12-19 DIAGNOSIS — I48.20 CHRONIC ATRIAL FIBRILLATION (H): ICD-10-CM

## 2019-12-19 DIAGNOSIS — Z79.01 LONG TERM CURRENT USE OF ANTICOAGULANT THERAPY: Primary | ICD-10-CM

## 2019-12-19 LAB — INR POINT OF CARE: 2.2 (ref 0.86–1.14)

## 2019-12-19 PROCEDURE — 36416 COLLJ CAPILLARY BLOOD SPEC: CPT

## 2019-12-19 PROCEDURE — 85610 PROTHROMBIN TIME: CPT | Mod: QW

## 2019-12-19 NOTE — PROGRESS NOTES
ANTICOAGULATION FOLLOW-UP CLINIC VISIT    Patient Name:  Malcolm Barker  Date:  2019  Contact Type:  Face to Face    SUBJECTIVE:  Patient Findings     Comments:   The patient was assessed for   diet, medication,   missed or extra doses,   bruising or bleeding,   with no problem findings.          Clinical Outcomes     Comments:   The patient was assessed for   diet, medication,   missed or extra doses,   bruising or bleeding,   with no problem findings.             OBJECTIVE    INR Protime   Date Value Ref Range Status   2019 2.2 (A) 0.86 - 1.14 Final       ASSESSMENT / PLAN  INR assessment THER    Recheck INR In: 4 WEEKS    INR Location Clinic      Anticoagulation Summary  As of 2019    INR goal:   2.0-3.0   TTR:   73.4 % (1 y)   INR used for dosin.2 (2019)   Warfarin maintenance plan:   2.5 mg (5 mg x 0.5) every Wed, Sat; 5 mg (5 mg x 1) all other days   Full warfarin instructions:   2.5 mg every Wed, Sat; 5 mg all other days   Weekly warfarin total:   30 mg   No change documented:   Vianca Kent RN   Plan last modified:   Mandi Phoenix RN (6/10/2016)   Next INR check:   2020   Priority:   Maintenance   Target end date:   Indefinite    Indications    Long term current use of anticoagulant therapy [Z79.01]  Chronic atrial fibrillation [I48.20]             Anticoagulation Episode Summary     INR check location:       Preferred lab:       Send INR reminders to:   JOHNNIE LACEY    Comments:   5mg tabs // APPT CARD // cell phone [phone]      Anticoagulation Care Providers     Provider Role Specialty Phone number    Tod Callahan MD  Internal Medicine [phone]            See the Encounter Report to view Anticoagulation Flowsheet and Dosing Calendar (Go to Encounters tab in chart review, and find the Anticoagulation Therapy Visit)    INR is therapeutic today.   Patient will continue same maintenance dose.   Follow up in 4 weeks.        Vianca Kent RN

## 2020-01-23 ENCOUNTER — ANTICOAGULATION THERAPY VISIT (OUTPATIENT)
Dept: NURSING | Facility: CLINIC | Age: 80
End: 2020-01-23
Payer: COMMERCIAL

## 2020-01-23 DIAGNOSIS — I48.20 CHRONIC ATRIAL FIBRILLATION (H): ICD-10-CM

## 2020-01-23 DIAGNOSIS — Z79.01 LONG TERM CURRENT USE OF ANTICOAGULANT THERAPY: Primary | ICD-10-CM

## 2020-01-23 LAB — INR POINT OF CARE: 2.4 (ref 0.86–1.14)

## 2020-01-23 PROCEDURE — 36416 COLLJ CAPILLARY BLOOD SPEC: CPT

## 2020-01-23 PROCEDURE — 85610 PROTHROMBIN TIME: CPT | Mod: QW

## 2020-01-23 NOTE — PROGRESS NOTES
ANTICOAGULATION FOLLOW-UP CLINIC VISIT    Patient Name:  Malcolm Barker  Date:  2020  Contact Type:  Face to Face    SUBJECTIVE:  Patient Findings     Comments:   The patient was assessed for   diet, medication,   missed or extra doses,   bruising or bleeding,   with no problem findings.          Clinical Outcomes     Comments:   The patient was assessed for   diet, medication,   missed or extra doses,   bruising or bleeding,   with no problem findings.             OBJECTIVE    INR Protime   Date Value Ref Range Status   2020 2.4 (A) 0.86 - 1.14 Final       ASSESSMENT / PLAN  INR assessment THER    Recheck INR In: 6 WEEKS    INR Location Clinic      Anticoagulation Summary  As of 2020    INR goal:   2.0-3.0   TTR:   73.4 % (1 y)   INR used for dosin.4 (2020)   Warfarin maintenance plan:   2.5 mg (5 mg x 0.5) every Wed, Sat; 5 mg (5 mg x 1) all other days   Full warfarin instructions:   2.5 mg every Wed, Sat; 5 mg all other days   Weekly warfarin total:   30 mg   No change documented:   Vianca Kent RN   Plan last modified:   Mandi Phoenix RN (6/10/2016)   Next INR check:   3/5/2020   Priority:   Maintenance   Target end date:   Indefinite    Indications    Long term current use of anticoagulant therapy [Z79.01]  Chronic atrial fibrillation [I48.20]             Anticoagulation Episode Summary     INR check location:       Preferred lab:       Send INR reminders to:   JOHNNIE LACEY    Comments:   5mg tabs // APPT CARD // cell phone [phone]      Anticoagulation Care Providers     Provider Role Specialty Phone number    Tod Callahan MD  Internal Medicine [phone]            See the Encounter Report to view Anticoagulation Flowsheet and Dosing Calendar (Go to Encounters tab in chart review, and find the Anticoagulation Therapy Visit)    INR is therapeutic today.   Patient will continue same maintenance dose.   Follow up in 6 weeks.        Vianca Kent RN

## 2020-03-05 ENCOUNTER — ANTICOAGULATION THERAPY VISIT (OUTPATIENT)
Dept: NURSING | Facility: CLINIC | Age: 80
End: 2020-03-05
Payer: COMMERCIAL

## 2020-03-05 DIAGNOSIS — Z79.01 LONG TERM CURRENT USE OF ANTICOAGULANT THERAPY: Primary | ICD-10-CM

## 2020-03-05 DIAGNOSIS — I48.20 CHRONIC ATRIAL FIBRILLATION (H): ICD-10-CM

## 2020-03-05 LAB — INR POINT OF CARE: 2.5 (ref 0.86–1.14)

## 2020-03-05 PROCEDURE — 36416 COLLJ CAPILLARY BLOOD SPEC: CPT

## 2020-03-05 PROCEDURE — 85610 PROTHROMBIN TIME: CPT | Mod: QW

## 2020-03-05 NOTE — PROGRESS NOTES
ANTICOAGULATION FOLLOW-UP CLINIC VISIT    Patient Name:  Malcolm Barker  Date:  3/5/2020  Contact Type:  Face to Face    SUBJECTIVE:  Patient Findings     Comments:   The patient was assessed for   diet, medication,   missed or extra doses,   bruising or bleeding,   with no problem findings.          Clinical Outcomes     Comments:   The patient was assessed for   diet, medication,   missed or extra doses,   bruising or bleeding,   with no problem findings.             OBJECTIVE    INR Protime   Date Value Ref Range Status   2020 2.5 (A) 0.86 - 1.14 Final       ASSESSMENT / PLAN  INR assessment THER    Recheck INR In: 6 WEEKS    INR Location Clinic      Anticoagulation Summary  As of 3/5/2020    INR goal:   2.0-3.0   TTR:   73.4 % (1 y)   INR used for dosin.5 (3/5/2020)   Warfarin maintenance plan:   2.5 mg (5 mg x 0.5) every Wed, Sat; 5 mg (5 mg x 1) all other days   Full warfarin instructions:   2.5 mg every Wed, Sat; 5 mg all other days   Weekly warfarin total:   30 mg   No change documented:   Vianca Kent RN   Plan last modified:   Mandi Phoenix RN (6/10/2016)   Next INR check:   2020   Priority:   Maintenance   Target end date:   Indefinite    Indications    Long term current use of anticoagulant therapy [Z79.01]  Chronic atrial fibrillation [I48.20]             Anticoagulation Episode Summary     INR check location:       Preferred lab:       Send INR reminders to:   JOHNNIE LACEY    Comments:   5mg tabs // APPT CARD // cell phone [phone]      Anticoagulation Care Providers     Provider Role Specialty Phone number    Tod Callahan MD  Internal Medicine [phone]            See the Encounter Report to view Anticoagulation Flowsheet and Dosing Calendar (Go to Encounters tab in chart review, and find the Anticoagulation Therapy Visit)    INR is therapeutic today.   Patient will continue same maintenance dose.   Follow up in 6 weeks.        Vianca Kent RN

## 2020-03-23 DIAGNOSIS — I48.20 CHRONIC ATRIAL FIBRILLATION (H): Primary | ICD-10-CM

## 2020-03-30 DIAGNOSIS — I48.20 CHRONIC ATRIAL FIBRILLATION (H): ICD-10-CM

## 2020-03-30 DIAGNOSIS — Z79.01 LONG TERM CURRENT USE OF ANTICOAGULANT THERAPY: Primary | ICD-10-CM

## 2020-03-31 RX ORDER — WARFARIN SODIUM 5 MG/1
TABLET ORAL
Qty: 78 TABLET | Refills: 1 | Status: SHIPPED | OUTPATIENT
Start: 2020-03-31 | End: 2020-10-08

## 2020-03-31 NOTE — TELEPHONE ENCOUNTER
"Requested Prescriptions   Pending Prescriptions Disp Refills     warfarin ANTICOAGULANT (COUMADIN) 5 MG tablet 78 tablet 3     Sig: TAKE 1/2 (ONE-HALF) TABLET BY MOUTH ON WED, SATURDAYS  AND 1 (ONE) TAB ON SUN, MON, TUES, THURS AND FRIDAYS OR AS DIRECTED BY INR CLINIC       Vitamin K Antagonists Failed - 3/30/2020  6:46 PM        Failed - INR is within goal in the past 6 weeks     Confirm INR is within goal in the past 6 weeks.     Recent Labs   Lab Test 03/05/20  1351   INR 2.5*        Last Written Prescription Date: 4/4/19  Last Fill Qty: 78, # refills: 3  Last Office Visit with [facility], [facility] or [facility] prescribing provider: 10/29/19               Failed - Medication is active on med list        Passed - Recent (12 mo) or future (30 days) visit within the authorizing provider's specialty     Patient has had an office visit with the authorizing provider or a provider within the authorizing providers department within the previous 12 mos or has a future within next 30 days. See \"Patient Info\" tab in inbasket, or \"Choose Columns\" in Meds & Orders section of the refill encounter.              Passed - Patient is 18 years of age or older           Refilled per nurse protocol standing orders.  Vianca Kent RN      "

## 2020-04-13 ENCOUNTER — DOCUMENTATION ONLY (OUTPATIENT)
Dept: PEDIATRICS | Facility: CLINIC | Age: 80
End: 2020-04-13

## 2020-04-13 ENCOUNTER — ANTICOAGULATION THERAPY VISIT (OUTPATIENT)
Dept: NURSING | Facility: CLINIC | Age: 80
End: 2020-04-13

## 2020-04-13 DIAGNOSIS — I48.20 CHRONIC ATRIAL FIBRILLATION (H): ICD-10-CM

## 2020-04-13 DIAGNOSIS — Z79.01 LONG TERM CURRENT USE OF ANTICOAGULANT THERAPY: ICD-10-CM

## 2020-04-13 LAB
CAPILLARY BLOOD COLLECTION: NORMAL
INR PPP: 2.2 (ref 0.86–1.14)

## 2020-04-13 PROCEDURE — 99207 ZZC NO CHARGE NURSE ONLY: CPT

## 2020-04-13 PROCEDURE — 36416 COLLJ CAPILLARY BLOOD SPEC: CPT | Performed by: INTERNAL MEDICINE

## 2020-04-13 PROCEDURE — 85610 PROTHROMBIN TIME: CPT | Performed by: INTERNAL MEDICINE

## 2020-04-13 NOTE — PROGRESS NOTES
ANTICOAGULATION FOLLOW-UP     [facility]  Date:  2020  Contact Type:  Telephone    SUBJECTIVE:  Patient Findings     Comments:   The patient was assessed for   diet, medication,   missed or extra doses,   bruising or bleeding,   with no problem findings.    Patient is requesting an orders letter to have his INR checked in [place] for the summer.  See documentation encounter from today.        Clinical Outcomes     Comments:   The patient was assessed for   diet, medication,   missed or extra doses,   bruising or bleeding,   with no problem findings.    Patient is requesting an orders letter to have his INR checked in [place] for the summer.  See documentation encounter from today.           OBJECTIVE    INR   Date Value Ref Range Status   2020 2.20 (H) 0.86 - 1.14 Final     Comment:     This test is intended for monitoring Coumadin therapy.  Results are not   accurate in patients with prolonged INR due to factor deficiency.         ASSESSMENT / PLAN  INR assessment THER    Recheck INR In: 6 WEEKS    INR Location Clinic lab     Anticoagulation Summary  As of 2020    INR goal:   2.0-3.0   TTR:   76.5 % (1 y)   INR used for dosin.20 (2020)   Warfarin maintenance plan:   2.5 mg (5 mg x 0.5) every Wed, Sat; 5 mg (5 mg x 1) all other days   Full warfarin instructions:   2.5 mg every Wed, Sat; 5 mg all other days   Weekly warfarin total:   30 mg   Plan last modified:   Mandi Phoenix RN (6/10/2016)   Next INR check:   2020   Priority:   Maintenance   Target end date:   Indefinite    Indications    Long term current use of anticoagulant therapy [Z79.01]  Chronic atrial fibrillation [I48.20]             Anticoagulation Episode Summary     INR check location:       Preferred lab:       Send INR reminders to:   JOHNNIE LACYE    Comments:   5mg tabs // APPT CARD // cell phone [phone]      Anticoagulation Care Providers     Provider Role Specialty Phone number     Tod Callahan MD  Internal Medicine [phone]            See the Encounter Report to view Anticoagulation Flowsheet and Dosing Calendar (Go to Encounters tab in chart review, and find the Anticoagulation Therapy Visit)    INR is therapeutic today.   Patient will continue same maintenance dose.   Follow up in 6 weeks.        Vianca Kent RN

## 2020-04-13 NOTE — PROGRESS NOTES
Patient is requesting an order letter to have his INR checked in [place] for the summer (as he has done for the past 3 years).    Letter done, printed, and in Dr. Callahan's inbasket for signature.    When finished, please fax to [facility] ([phone]) and then mail to patient's home.    Vianca Kent RN  [facility]

## 2020-04-13 NOTE — LETTER
[address] [address]                  [phone]                       2020      To: [facility]  [address] [place] [address]  Fax: [phone]        Orders for Malcolm Barker     : 1940     He is being treated with warfarin for atrial fibrillation and ischemic cardiomyopathy and requires his INR checked as needed.       Draw INR every 2 to 6 weeks starting 2020 through 2020.     Fingerstick INR is acceptable.       Diag: Z79.01  Long-term (current) use of anticoagulants,   I48.2 Chronic atrial fibrillation (H),   I25.5 Ischemic cardiomyopathy            Fax result to:      [facility] (Fax: [phone]).  Phone number: [phone]     Please also give result to patient.        Thanks,                 Tod Callahan MD  UPIN# [id number]  [address] [address]  [phone]

## 2020-04-13 NOTE — LETTER
[address] [address]                  [phone]                       2020      To: [facility]  [address] [place] [address]  Fax: [phone]        Orders for Malcolm Barker     : 1940     He is being treated with warfarin for atrial fibrillation and ischemic cardiomyopathy and requires his INR checked as needed.       Draw INR every 2 to 6 weeks starting 2020 through 2020.     Fingerstick INR is acceptable.       Diag: Z79.01  Long-term (current) use of anticoagulants,   I48.2 Chronic atrial fibrillation (H),   I25.5 Ischemic cardiomyopathy            Fax result to:      [facility] (Fax: [phone]).  Phone number: [phone]     Please also give result to patient.        Thanks,                 Tod Callahan MD  UPIN# [id number]  [address] [address]  [phone]

## 2020-05-21 ENCOUNTER — ANTICOAGULATION THERAPY VISIT (OUTPATIENT)
Dept: NURSING | Facility: CLINIC | Age: 80
End: 2020-05-21

## 2020-05-21 DIAGNOSIS — Z79.01 LONG TERM CURRENT USE OF ANTICOAGULANT THERAPY: Primary | ICD-10-CM

## 2020-05-21 DIAGNOSIS — I48.20 CHRONIC ATRIAL FIBRILLATION (H): ICD-10-CM

## 2020-05-21 LAB — INR PPP: 3 (ref 0.86–1.14)

## 2020-05-21 PROCEDURE — 99207 ZZC NO CHARGE NURSE ONLY: CPT

## 2020-05-21 PROCEDURE — 36416 COLLJ CAPILLARY BLOOD SPEC: CPT | Performed by: INTERNAL MEDICINE

## 2020-05-21 PROCEDURE — 85610 PROTHROMBIN TIME: CPT | Performed by: INTERNAL MEDICINE

## 2020-05-21 NOTE — PROGRESS NOTES
ANTICOAGULATION FOLLOW-UP     [facility]  Date:  5/21/2020  Contact Type:  Telephone    SUBJECTIVE:  Patient Findings     Positives:   Change in medications    Comments:   Per cardiology on 4/28/2020:  Increase Bumex to 2 mg twice daily (from 1.5 mg bid)  Per Micromedex: No interactions with warfarin found.    The patient was assessed for   diet, medication,   missed or extra doses,   bruising or bleeding,   with no problem findings.  No questions or concerns.  Reviewed previous warfarin dosing with patient.    Going to [place] on Saturday. Will be there for most of the summer.  Has orders to get INR done in [place].    INR is therapeutic today.   Patient will continue same maintenance dose.   Follow up in 6 weeks in [place].            Clinical Outcomes     Comments:   Per cardiology on 4/28/2020:  Increase Bumex to 2 mg twice daily (from 1.5 mg bid)  Per Micromedex: No interactions with warfarin found.    The patient was assessed for   diet, medication,   missed or extra doses,   bruising or bleeding,   with no problem findings.  No questions or concerns.  Reviewed previous warfarin dosing with patient.    Going to [place] on Saturday. Will be there for most of the summer.  Has orders to get INR done in [place].    INR is therapeutic today.   Patient will continue same maintenance dose.   Follow up in 6 weeks in [place].               OBJECTIVE    Recent labs: (last 7 days)     05/21/20  1009   INR 3.00*       ASSESSMENT / PLAN  INR assessment THER    Recheck INR In: 6 WEEKS    INR Location Clinic lab     Anticoagulation Summary  As of 5/21/2020    INR goal:   2.0-3.0   TTR:   80.2 % (1 y)   INR used for dosing:   3.00 (5/21/2020)   Warfarin maintenance plan:   2.5 mg (5 mg x 0.5) every Wed, Sat; 5 mg (5 mg x 1) all other days   Full warfarin instructions:   2.5 mg every Wed, Sat; 5 mg all other days   Weekly warfarin total:   30 mg   No change documented:   Vianca Kent, APPLE   Plan  last modified:   Mandi Phoenix RN (6/10/2016)   Next INR check:   7/2/2020   Priority:   Maintenance   Target end date:   Indefinite    Indications    Long term current use of anticoagulant therapy [Z79.01]  Chronic atrial fibrillation [I48.20]             Anticoagulation Episode Summary     INR check location:       Preferred lab:       Send INR reminders to:   JOHNNIE LACEY    Comments:   5mg tabs // APPT CARD // cell phone [phone]      Anticoagulation Care Providers     Provider Role Specialty Phone number    Tod Callahan MD  Internal Medicine [phone]            See the Encounter Report to view Anticoagulation Flowsheet and Dosing Calendar (Go to Encounters tab in chart review, and find the Anticoagulation Therapy Visit)    INR is therapeutic today.   Patient will continue same maintenance dose.   Follow up in 6 weeks.        Vianca Kent RN

## 2020-06-11 DIAGNOSIS — G60.3 IDIOPATHIC PROGRESSIVE NEUROPATHY: ICD-10-CM

## 2020-06-11 RX ORDER — GABAPENTIN 300 MG/1
300 CAPSULE ORAL 2 TIMES DAILY
Qty: 180 CAPSULE | Refills: 3 | Status: SHIPPED | OUTPATIENT
Start: 2020-06-11 | End: 2020-11-02

## 2020-07-02 ENCOUNTER — DOCUMENTATION ONLY (OUTPATIENT)
Dept: PEDIATRICS | Facility: CLINIC | Age: 80
End: 2020-07-02

## 2020-07-02 DIAGNOSIS — I48.20 CHRONIC ATRIAL FIBRILLATION (H): Primary | ICD-10-CM

## 2020-07-02 NOTE — PROGRESS NOTES
ANTICOAGULATION MANAGEMENT      [facility] due for annual renewal of referral to anticoagulation monitoring. Order pended for your review and signature.      ANTICOAGULATION SUMMARY      Warfarin indication(s)     Atrial fibrillation    Heart valve present?  NO       Current goal range   INR: 2.0-3.0     Goal appropriate for indication? Yes, INR 2-3 appropriate for hx of DVT, PE, Afib, LVAD, or bileaflet AVR without risk factors     Current duration of therapy Indefinite/long term therapy   Time in Therapeutic Range (TTR)  (Goal > 60%) 78 %     Office visit with referring provider's group within last year yes on 10/29/19       Loulou Chan RN

## 2020-07-08 ENCOUNTER — ANTICOAGULATION THERAPY VISIT (OUTPATIENT)
Dept: PEDIATRICS | Facility: CLINIC | Age: 80
End: 2020-07-08

## 2020-07-08 DIAGNOSIS — I48.20 CHRONIC ATRIAL FIBRILLATION (H): ICD-10-CM

## 2020-07-08 DIAGNOSIS — Z79.01 LONG TERM CURRENT USE OF ANTICOAGULANT THERAPY: ICD-10-CM

## 2020-07-08 LAB — INR PPP: 2.7 (ref 0.9–1.1)

## 2020-07-08 NOTE — PROGRESS NOTES
ANTICOAGULATION MANAGEMENT     [facility]  Date:  2020    ASSESSMENT /SUBJECTIVE:    Today's INR result of 2.7 is therapeutic. Goal INR of 2.0-3.0      Warfarin dose taken: Warfarin taken as previously instructed    Diet: No new diet changes affecting INR    Medication changes/ interactions: No new medications/supplements affecting INR    Previous INR: Therapeutic     S/S of bleeding or thromboembolism: No    New injury or illness: No    Upcoming surgery, procedure or cardioversion: No    Additional findings: None      PLAN:    Spoke with Malcolm regarding INR result and instructed:     Warfarin Dosing Instructions: Continue your current warfarin dose 2.5 mg on wed/sat and 5 mg all other days    Instructed patient to follow up no later than: 6 weeks  Patient using outside facility for labs    Education provided: None required      Malcolm verbalizes understanding and agrees to warfarin dosing plan.    Instructed to call the Anticoagulation Clinic for any changes, questions or concerns. (#[phone])        OBJECTIVE:  INR   Date Value Ref Range Status   2020 2.7 (A) 0.90 - 1.10 Final         No question data found.  Anticoagulation Summary  As of 2020    INR goal:   2.0-3.0   TTR:   81.9 % (1 y)   INR used for dosin.7 (2020)   Warfarin maintenance plan:   2.5 mg (5 mg x 0.5) every Wed, Sat; 5 mg (5 mg x 1) all other days   Full warfarin instructions:   2.5 mg every Wed, Sat; 5 mg all other days   Weekly warfarin total:   30 mg   Plan last modified:   Mandi Phoenix RN (6/10/2016)   Next INR check:   2020   Priority:   Maintenance   Target end date:   Indefinite    Indications    Long term current use of anticoagulant therapy [Z79.01]  Chronic atrial fibrillation (H) [I48.20]             Anticoagulation Episode Summary     INR check location:       Preferred lab:       Send INR reminders to:   JOHNNIE GONSALEZ    Comments:   5mg tabs // APPT CARD // cell phone  [phone]      Anticoagulation Care Providers     Provider Role Specialty Phone number    Leisa Mistry MD Referring Internal Medicine [phone]    Tod Callahan MD  Internal Medicine [phone]

## 2020-07-08 NOTE — PROGRESS NOTES
Anticoagulation Management    Unable to reach Jaskaran today.    Today's INR result of 2.7 is therapeutic (goal INR of 2.0-3.0).  Result received from: External Lab    Follow up required to confirm warfarin dose taken and assess for changes    LMTCB     Tentative plan: continue maintenance dosing and recheck in 6 weeks if no changes.    Anticoagulation clinic to follow up    Shirley Newton RN

## 2020-08-28 ENCOUNTER — DOCUMENTATION ONLY (OUTPATIENT)
Dept: PEDIATRICS | Facility: CLINIC | Age: 80
End: 2020-08-28

## 2020-08-28 NOTE — PROGRESS NOTES
Malcolm Barker is overdue for INR check.      Left message for patient to call and schedule INR check as soon as possible. If returning call, please schedule.      Shirley Newton RN

## 2020-08-31 ENCOUNTER — ANTICOAGULATION THERAPY VISIT (OUTPATIENT)
Dept: NURSING | Facility: CLINIC | Age: 80
End: 2020-08-31
Payer: COMMERCIAL

## 2020-08-31 DIAGNOSIS — Z79.01 LONG TERM CURRENT USE OF ANTICOAGULANT THERAPY: Primary | ICD-10-CM

## 2020-08-31 DIAGNOSIS — I48.20 CHRONIC ATRIAL FIBRILLATION (H): ICD-10-CM

## 2020-08-31 LAB — INR PPP: 2.3 (ref 0.9–1.1)

## 2020-08-31 PROCEDURE — 99207 ZZC NO CHARGE NURSE ONLY: CPT

## 2020-08-31 NOTE — PROGRESS NOTES
ANTICOAGULATION FOLLOW-UP CLINIC VISIT    [facility]  Date:  [facility]  Contact Type:  Telephone    SUBJECTIVE:  Patient Findings     Comments:   Patient called [facility].  He had his INR done today in [place]. Result was 2.3.    The patient was assessed for   diet, medication,   missed or extra doses,   bruising or bleeding,   with no problem findings.  No questions or concerns.  Reviewed previous warfarin dosing with patient.  He took warfarin as instructed.    INR is therapeutic today.   Patient will continue same maintenance dose.   Follow up in 6 weeks.        Clinical Outcomes     Comments:   Patient called [facility].  He had his INR done today in [place]. Result was 2.3.    The patient was assessed for   diet, medication,   missed or extra doses,   bruising or bleeding,   with no problem findings.  No questions or concerns.  Reviewed previous warfarin dosing with patient.  He took warfarin as instructed.    INR is therapeutic today.   Patient will continue same maintenance dose.   Follow up in 6 weeks.           OBJECTIVE    Recent labs: (last 7 days)     20  1430   INR 2.3*       ASSESSMENT / PLAN  INR assessment THER    Recheck INR In: 6 WEEKS    INR Location Outside lab      Anticoagulation Summary  As of 2020    INR goal:   2.0-3.0   TTR:   85.9 % (1 y)   INR used for dosin.3 (2020)   Warfarin maintenance plan:   2.5 mg (5 mg x 0.5) every Wed, Sat; 5 mg (5 mg x 1) all other days   Full warfarin instructions:   2.5 mg every Wed, Sat; 5 mg all other days   Weekly warfarin total:   30 mg   No change documented:   Vianca Kent RN   Plan last modified:   Mandi Phoenix RN (6/10/2016)   Next INR check:   10/12/2020   Priority:   Maintenance   Target end date:   Indefinite    Indications    Long term current use of anticoagulant therapy [Z79.01]  Chronic atrial fibrillation (H) [I48.20]             Anticoagulation Episode Summary     INR check  location:       Preferred lab:       Send INR reminders to:   JOHNNIE GONSALEZ    Comments:   5mg tabs // APPT CARD // cell phone [phone]      Anticoagulation Care Providers     Provider Role Specialty Phone number    Leisa Mistry MD Referring Internal Medicine [phone]    Tod Callahan MD  Internal Medicine [phone]            See the Encounter Report to view Anticoagulation Flowsheet and Dosing Calendar (Go to Encounters tab in chart review, and find the Anticoagulation Therapy Visit)    INR is therapeutic today.   Patient will continue same maintenance dose.   Follow up in 6 weeks.        Vianca Kent RN

## 2020-10-08 DIAGNOSIS — Z79.01 LONG TERM CURRENT USE OF ANTICOAGULANT THERAPY: ICD-10-CM

## 2020-10-08 DIAGNOSIS — I48.20 CHRONIC ATRIAL FIBRILLATION (H): ICD-10-CM

## 2020-10-08 RX ORDER — WARFARIN SODIUM 5 MG/1
TABLET ORAL
Qty: 78 TABLET | Refills: 0 | Status: SHIPPED | OUTPATIENT
Start: 2020-10-08 | End: 2021-01-05

## 2020-10-08 NOTE — TELEPHONE ENCOUNTER
Medication Question or Refill    Who is calling: patient    What medication are you calling about (include dose and sig)?: warfarin ANTICOAGULANT (COUMADIN) 5 MG tablet [12711]      Controlled Substance Agreement on file: No    Who prescribed the medication?: Dr. Callahan    Do you need a refill? Yes: - pt states pharmacy faxed over request x2, however chart does not reflect we received refill request.     When did you use the medication last? as directed    Patient offered an appointment? No    Do you have any questions or concerns?  No    Requested Pharmacy:    [facility] [place]       Okay to leave a detailed message?: Yes at Cell number on file:    Telephone Information:   Mobile [phone]

## 2020-10-08 NOTE — TELEPHONE ENCOUNTER
Medication requested:   warfarin 5 mg tablets    Last Written Prescription Date: 3/31/2020  Last Fill Qty: 78, # refills: 1  Last Office Visit with [facility], [facility] or [facility] prescribing provider: 10/29/19     Lab Results   Component Value Date    INR 2.3 08/31/2020    INR 2.7 07/06/2020     Patient will need appointment with PCP before next refill.  Appointment scheduled for 11/2/2020.    Refilled per nurse protocol standing orders.  Vianca Kent RN

## 2020-10-13 ENCOUNTER — ANTICOAGULATION THERAPY VISIT (OUTPATIENT)
Dept: PEDIATRICS | Facility: CLINIC | Age: 80
End: 2020-10-13

## 2020-10-13 DIAGNOSIS — I48.20 CHRONIC ATRIAL FIBRILLATION (H): ICD-10-CM

## 2020-10-13 DIAGNOSIS — Z79.01 LONG TERM CURRENT USE OF ANTICOAGULANT THERAPY: ICD-10-CM

## 2020-10-13 LAB
CAPILLARY BLOOD COLLECTION: NORMAL
INR PPP: 2.3 (ref 0.86–1.14)

## 2020-10-13 PROCEDURE — 36416 COLLJ CAPILLARY BLOOD SPEC: CPT | Performed by: INTERNAL MEDICINE

## 2020-10-13 PROCEDURE — 85610 PROTHROMBIN TIME: CPT | Performed by: INTERNAL MEDICINE

## 2020-10-13 NOTE — PROGRESS NOTES
Anticoagulation Management    Unable to reach Jaskaran  today.    Today's INR result of 2.30 is therapeutic (goal INR of 2.0-3.0).  Result received from: Clinic Lab    Follow up required to confirm warfarin dose taken and assess for changes    Left message to continue current dose of warfarin 5 mg tonight.      Anticoagulation clinic to follow up    Shirley Newton RN

## 2020-10-13 NOTE — PROGRESS NOTES
ANTICOAGULATION MANAGEMENT     [facility]  Date:  10/13/2020    ASSESSMENT /SUBJECTIVE:    Today's INR result of 2.3 is therapeutic. Goal INR of 2.0-3.0      Warfarin dose taken: Warfarin taken as instructed    Diet: No new diet changes affecting INR    Medication changes/ interactions: No new medications/supplements affecting INR    Previous INR: Therapeutic     S/S of bleeding or thromboembolism: No    New injury or illness: No    Upcoming surgery, procedure or cardioversion: No    Additional findings: None      PLAN:    Telephone call with Malcolm regarding INR result and instructed:     Warfarin Dosing Instructions: Continue your current warfarin dose 2.5 mg W,Sat; 5 mg ROW    Instructed patient to follow up no later than: 8 weeks to accommodate his holiday plans  Lab visit scheduled    Education provided: None required      Jaskaran verbalizes understanding and agrees to warfarin dosing plan.    Instructed to call the Anticoagulation Clinic for any changes, questions or concerns. (#[phone])        Luann Kwok RN      OBJECTIVE:  Recent labs: (last 7 days)     10/13/20  1300   INR 2.30*         INR assessment THER    Recheck INR In: 6 WEEKS    INR Location Clinic      Anticoagulation Summary  As of 10/13/2020    INR goal:  2.0-3.0   TTR:  85.9 % (1 y)   INR used for dosin.30 (10/13/2020)   Warfarin maintenance plan:  2.5 mg (5 mg x 0.5) every Wed, Sat; 5 mg (5 mg x 1) all other days   Full warfarin instructions:  2.5 mg every Wed, Sat; 5 mg all other days   Weekly warfarin total:  30 mg   No change documented:  Shirley Newton RN   Plan last modified:  Mandi Phoenix RN (6/10/2016)   Next INR check:  2020   Priority:  Maintenance   Target end date:  Indefinite    Indications    Long term current use of anticoagulant therapy [Z79.01]  Chronic atrial fibrillation (H) [I48.20]             Anticoagulation Episode Summary     INR check location:      Preferred lab:       Send INR reminders to:  JOHNNIE GONSALEZ    Comments:  5mg tabs // APPT CARD // cell phone [phone]      Anticoagulation Care Providers     Provider Role Specialty Phone number    Leisa Mistry MD Referring Internal Medicine [phone]    Tod Callahan MD  Internal Medicine [phone]

## 2020-11-02 ENCOUNTER — OFFICE VISIT (OUTPATIENT)
Dept: PEDIATRICS | Facility: CLINIC | Age: 80
End: 2020-11-02
Payer: COMMERCIAL

## 2020-11-02 VITALS
TEMPERATURE: 98.1 F | WEIGHT: 208.2 LBS | HEART RATE: 60 BPM | SYSTOLIC BLOOD PRESSURE: 84 MMHG | HEIGHT: 72 IN | RESPIRATION RATE: 14 BRPM | BODY MASS INDEX: 28.2 KG/M2 | DIASTOLIC BLOOD PRESSURE: 56 MMHG

## 2020-11-02 DIAGNOSIS — I10 ESSENTIAL HYPERTENSION, BENIGN: ICD-10-CM

## 2020-11-02 DIAGNOSIS — Z12.5 SCREENING FOR PROSTATE CANCER: ICD-10-CM

## 2020-11-02 DIAGNOSIS — E78.5 HYPERLIPIDEMIA WITH TARGET LDL LESS THAN 100: ICD-10-CM

## 2020-11-02 DIAGNOSIS — N52.9 ERECTILE DYSFUNCTION, UNSPECIFIED ERECTILE DYSFUNCTION TYPE: ICD-10-CM

## 2020-11-02 DIAGNOSIS — N18.31 STAGE 3A CHRONIC KIDNEY DISEASE (H): ICD-10-CM

## 2020-11-02 DIAGNOSIS — I25.5 ISCHEMIC CARDIOMYOPATHY: ICD-10-CM

## 2020-11-02 DIAGNOSIS — I48.20 CHRONIC ATRIAL FIBRILLATION (H): ICD-10-CM

## 2020-11-02 DIAGNOSIS — Z00.00 ENCOUNTER FOR ANNUAL WELLNESS EXAM IN MEDICARE PATIENT: Primary | ICD-10-CM

## 2020-11-02 DIAGNOSIS — G60.3 IDIOPATHIC PROGRESSIVE NEUROPATHY: ICD-10-CM

## 2020-11-02 PROCEDURE — 99213 OFFICE O/P EST LOW 20 MIN: CPT | Mod: 25 | Performed by: INTERNAL MEDICINE

## 2020-11-02 PROCEDURE — 99397 PER PM REEVAL EST PAT 65+ YR: CPT | Performed by: INTERNAL MEDICINE

## 2020-11-02 RX ORDER — SILDENAFIL CITRATE 20 MG/1
TABLET ORAL
Qty: 30 TABLET | Refills: 11 | Status: SHIPPED | OUTPATIENT
Start: 2020-11-02 | End: 2021-11-09

## 2020-11-02 RX ORDER — GABAPENTIN 300 MG/1
CAPSULE ORAL
Qty: 450 CAPSULE | Refills: 3 | Status: SHIPPED | OUTPATIENT
Start: 2020-11-02 | End: 2021-01-04

## 2020-11-02 ASSESSMENT — ENCOUNTER SYMPTOMS: HEARTBURN: 1

## 2020-11-02 ASSESSMENT — ACTIVITIES OF DAILY LIVING (ADL): CURRENT_FUNCTION: NO ASSISTANCE NEEDED

## 2020-11-02 ASSESSMENT — MIFFLIN-ST. JEOR: SCORE: 1689.45

## 2020-11-02 NOTE — PROGRESS NOTES
"SUBJECTIVE:   Malcolm Barker is a 80 year old male who presents for Preventive Visit.      Patient has been advised of split billing requirements and indicates understanding: Yes   Are you in the first 12 months of your Medicare coverage?  No    Healthy Habits:     In general, how would you rate your overall health?  Good    Frequency of exercise:  4-5 days/week    Duration of exercise:  Greater than 60 minutes    Do you usually eat at least 4 servings of fruit and vegetables a day, include whole grains    & fiber and avoid regularly eating high fat or \"junk\" foods?  No    Taking medications regularly:  Yes    Medication side effects:  None    Ability to successfully perform activities of daily living:  No assistance needed    Home Safety:  No safety concerns identified    Hearing Impairment:  No hearing concerns    In the past 6 months, have you been bothered by leaking of urine?  No    In general, how would you rate your overall mental or emotional health?  Good      PHQ-2 Total Score: 0    Additional concerns today:  Yes  wants increase in Gabapentin and prn increase in Omeprazole  Do you feel safe in your environment? Yes    Have you ever done Advance Care Planning? (For example, a Health Directive, POLST, or a discussion with a medical provider or your loved ones about your wishes): Yes, patient states has an Advance Care Planning document and will bring a copy to the clinic.    Cardiac history - ASCVD, MI, CHF, cardiomyopathy, AF. Managed by cardiology.  No cardiac sx such as CP, palpitations, PND, orthopnea, ALFARO or peripheral edema.   BP Readings from Last 3 Encounters:   11/02/20 (!) 84/56   10/29/19 100/60   04/08/19 90/52     Chronic peripheral neuropathy. He has increased gabapentin dose to 4 per day. Does seem to help at this dose but still has sx. Discussed increasing further.     ED. Uses sildenafil prn. Does seem to help.    CKD based on last several metabolic panels. GFR in the 50's but " stable.    Glaucoma. Had surgery earlier this year.     Fall risk  Fallen 2 or more times in the past year?: No  Any fall with injury in the past year?: No    Cognitive Screening   1) Repeat 3 items (Leader, Season, Table)    2) Clock draw: NORMAL  3) 3 item recall: Recalls 3 objects  Results: 3 items recalled: COGNITIVE IMPAIRMENT LESS LIKELY    Mini-CogTM Copyright JOB Pabon. Licensed by the author for use in [facility]; reprinted with permission (marlene@[facility]). All rights reserved.      Do you have sleep apnea, excessive snoring or daytime drowsiness?: no    Reviewed and updated as needed this visit by Provider  Tobacco  Allergies  Meds  Problems  Med Hx  Surg Hx  Fam Hx         Social History     Tobacco Use     Smoking status: Former Smoker     Years: 3.00     Types: Cigarettes     Quit date: 1999     Years since quittin.6     Smokeless tobacco: Never Used     Tobacco comment: quit approx    Substance Use Topics     Alcohol use: Yes     Comment: 2 drinks daily     If you drink alcohol do you typically have >3 drinks per day or >7 drinks per week? Yes      Alcohol Use 2020   Prescreen: >3 drinks/day or >7 drinks/week? No   Prescreen: >3 drinks/day or >7 drinks/week? -   AUDIT SCORE  4     AUDIT - Alcohol Use Disorders Identification Test - Reproduced from the World Health Organization Audit 2001 (Second Edition) 2020   1.  How often do you have a drink containing alcohol? 4 or more times a week   2.  How many drinks containing alcohol do you have on a typical day when you are drinking? 1 or 2   3.  How often do you have five or more drinks on one occasion? Never   4.  How often during the last year have you found that you were not able to stop drinking once you had started? Never   5.  How often during the last year have you failed to do what was normally expected of you because of drinking? Never   6.  How often during the last year have you needed a first drink in the  "morning to get yourself going after a heavy drinking session? Never   7.  How often during the last year have you had a feeling of guilt or remorse after drinking? Never   8.  How often during the last year have you been unable to remember what happened the night before because of your drinking? Never   9.  Have you or someone else been injured because of your drinking? No   10. Has a relative, friend, doctor or other health care worker been concerned about your drinking or suggested you cut down? No   TOTAL SCORE 4       Current providers sharing in care for this patient include:   Patient Care Team:  Tod Callahan MD as PCP - General (Internal Medicine)  Mk Tomlinson MD as Assigned PCP    The following health maintenance items are reviewed in Epic and correct as of today:  Health Maintenance   Topic Date Due     ANNUAL REVIEW OF HM ORDERS  1940     HF ACTION PLAN  12/06/2016     BMP  09/28/2018     CBC  03/28/2019     ZOSTER IMMUNIZATION (3 of 3) 10/08/2019     FALL RISK ASSESSMENT  04/08/2020     ALT  04/25/2020     LIPID  04/25/2020     MEDICARE ANNUAL WELLNESS VISIT  11/02/2021     DTAP/TDAP/TD IMMUNIZATION (4 - Td) 03/18/2024     ADVANCE CARE PLANNING  11/02/2025     TSH W/FREE T4 REFLEX  Completed     HEPATITIS C SCREENING  Completed     PHQ-2  Completed     INFLUENZA VACCINE  Completed     Pneumococcal Vaccine: 65+ Years  Completed     Pneumococcal Vaccine: Pediatrics (0 to 5 Years) and At-Risk Patients (6 to 64 Years)  Aged Out     IPV IMMUNIZATION  Aged Out     MENINGITIS IMMUNIZATION  Aged Out     HEPATITIS B IMMUNIZATION  Aged Out       Review of Systems   Gastrointestinal: Positive for heartburn.   Genitourinary: Positive for impotence and urgency. Negative for discharge.         OBJECTIVE:   BP (!) 84/56 (BP Location: Right arm, Patient Position: Sitting, Cuff Size: Adult Large)   Pulse 60   Temp 98.1  F (36.7  C) (Tympanic)   Resp 14   Ht 1.816 m (5' 11.5\")   Wt 94.4 kg (208 " "lb 3.2 oz)   BMI 28.63 kg/m   Estimated body mass index is 28.63 kg/m  as calculated from the following:    Height as of this encounter: 1.816 m (5' 11.5\").    Weight as of this encounter: 94.4 kg (208 lb 3.2 oz).  Physical Exam  GENERAL: healthy, alert and no distress  EYES: Eyes grossly normal to inspection, PERRL and conjunctivae and sclerae normal  HENT: ear canals and TM's normal  NECK: no adenopathy, no asymmetry, masses, or scars and thyroid normal to palpation  RESP: lungs clear to auscultation - no rales, rhonchi or wheezes  CV: regular rate and rhythm, normal S1 S2, no murmur, no peripheral edema and peripheral pulses strong  ABDOMEN: soft, nontender, bowel sounds normal  MS: no gross musculoskeletal defects noted, no edema  SKIN: no suspicious lesions or rashes  NEURO: Normal strength and tone, mentation intact and speech normal  PSYCH: mentation appears normal, affect normal/bright        ASSESSMENT / PLAN:       ICD-10-CM    1. Encounter for annual wellness exam in Medicare patient  Z00.00    2. Ischemic cardiomyopathy  I25.5 CBC with platelets   3. Idiopathic progressive neuropathy  G60.3 gabapentin (NEURONTIN) 300 MG capsule   4. Stage 3a chronic kidney disease  N18.31    5. Chronic atrial fibrillation (H)  I48.20    6. Essential hypertension, benign  I10    7. Hyperlipidemia with target LDL less than 100  E78.5    8. Erectile dysfunction, unspecified erectile dysfunction type  N52.9 sildenafil (REVATIO) 20 MG tablet   9. Screening for prostate cancer  Z12.5 Prostate spec antigen screen     Overall doing well.  Increase gabapentin dose to 5 per day total.  Refilled needed meds.  Future lab orders for PSA and CBC to round out labs from his cardiologist.    COUNSELING:  Reviewed preventive health counseling, as reflected in patient instructions    Estimated body mass index is 28.63 kg/m  as calculated from the following:    Height as of this encounter: 1.816 m (5' 11.5\").    Weight as of this " encounter: 94.4 kg (208 lb 3.2 oz).        He reports that he quit smoking about 21 years ago. His smoking use included cigarettes. He quit after 3.00 years of use. He has never used smokeless tobacco.      Appropriate preventive services were discussed with this patient, including applicable screening as appropriate for cardiovascular disease, diabetes, osteopenia/osteoporosis, and glaucoma.  As appropriate for age/gender, discussed screening for colorectal cancer, prostate cancer. Checklist reviewing preventive services available has been given to the patient.    Reviewed patients plan of care and provided an AVS. The Basic Care Plan (routine screening as documented in Health Maintenance) for Malcolm meets the Care Plan requirement. This Care Plan has been established and reviewed with the Patient.    Counseling Resources:  ATP IV Guidelines  Pooled Cohorts Equation Calculator  Breast Cancer Risk Calculator  Breast Cancer: Medication to Reduce Risk  FRAX Risk Assessment  ICSI Preventive Guidelines  Dietary Guidelines for Americans, 2010  USDA's MyPlate  ASA Prophylaxis  Lung CA Screening    Tod Callahan MD  [facility]    Identified Health Risks:

## 2020-11-04 ENCOUNTER — TRANSFERRED RECORDS (OUTPATIENT)
Dept: HEALTH INFORMATION MANAGEMENT | Facility: CLINIC | Age: 80
End: 2020-11-04

## 2020-12-08 ENCOUNTER — ANTICOAGULATION THERAPY VISIT (OUTPATIENT)
Dept: NURSING | Facility: CLINIC | Age: 80
End: 2020-12-08

## 2020-12-08 DIAGNOSIS — I48.20 CHRONIC ATRIAL FIBRILLATION (H): ICD-10-CM

## 2020-12-08 DIAGNOSIS — I25.5 ISCHEMIC CARDIOMYOPATHY: ICD-10-CM

## 2020-12-08 DIAGNOSIS — Z79.01 LONG TERM CURRENT USE OF ANTICOAGULANT THERAPY: ICD-10-CM

## 2020-12-08 LAB
CAPILLARY BLOOD COLLECTION: NORMAL
INR PPP: 2.3 (ref 0.86–1.14)

## 2020-12-08 PROCEDURE — 36416 COLLJ CAPILLARY BLOOD SPEC: CPT | Performed by: INTERNAL MEDICINE

## 2020-12-08 PROCEDURE — 85610 PROTHROMBIN TIME: CPT | Performed by: INTERNAL MEDICINE

## 2020-12-08 NOTE — PROGRESS NOTES
ANTICOAGULATION MANAGEMENT     [facility]  Date:  [facility]    ASSESSMENT /SUBJECTIVE:    Today's INR result of 2.3 is therapeutic. Goal INR of 2.0-3.0      Warfarin dose taken: Warfarin taken as instructed    Diet: No new diet changes affecting INR    Medication changes/ interactions: No new medications/supplements affecting INR    Previous INR: Therapeutic     S/S of bleeding or thromboembolism: No    New injury or illness: No    Upcoming surgery, procedure or cardioversion: No    Additional findings: None      PLAN:    Telephone call with Malcolm regarding INR result and instructed:     Warfarin Dosing Instructions: Continue your current warfarin dose 2.5 mg Wed/Sat and 5 mg ROW    Instructed patient to follow up no later than: 6 weeks  Lab visit scheduled    Education provided: Target INR goal and significance of current INR result      Jaskaran verbalizes understanding and agrees to warfarin dosing plan.    Instructed to call the Anticoagulation Clinic for any changes, questions or concerns. (#[phone])        Regina Yuan RN      OBJECTIVE:  Recent labs: (last 7 days)     20  1300   INR 2.30*         No question data found.  Anticoagulation Summary  As of 2020    INR goal:  2.0-3.0   TTR:  99.3 % (1 y)   INR used for dosin.30 (2020)   Warfarin maintenance plan:  2.5 mg (5 mg x 0.5) every Wed, Sat; 5 mg (5 mg x 1) all other days   Full warfarin instructions:  2.5 mg every Wed, Sat; 5 mg all other days   Weekly warfarin total:  30 mg   No change documented:  Regina Yuan RN   Plan last modified:  Mandi Phoenix RN (6/10/2016)   Next INR check:  2021   Priority:  Maintenance   Target end date:  Indefinite    Indications    Long term current use of anticoagulant therapy [Z79.01]  Chronic atrial fibrillation (H) [I48.20]             Anticoagulation Episode Summary     INR check location:      Preferred lab:      Send INR reminders to:  JOHNNIE LACEY     Comments:  5mg tabs // APPT CARD // cell phone [phone]      Anticoagulation Care Providers     Provider Role Specialty Phone number    Leisa Mistry MD Referring Internal Medicine - Pediatrics [phone]    Tod Callahan MD  Internal Medicine - Pediatrics [phone]

## 2020-12-08 NOTE — PROGRESS NOTES
Anticoagulation Management    Unable to reach Jaskaran today.    Today's INR result of 2.3 is therapeutic (goal INR of 2.0-3.0).  Result received from: Clinic Lab    Follow up required to confirm warfarin dose taken and assess for changes    Left message to call [phone]     Plan: continue maintenance dose and recheck INR in 6 weeks    Anticoagulation clinic to follow up    Regina Yuan RN

## 2021-01-04 ENCOUNTER — MYC MEDICAL ADVICE (OUTPATIENT)
Dept: PEDIATRICS | Facility: CLINIC | Age: 81
End: 2021-01-04

## 2021-01-04 DIAGNOSIS — G60.3 IDIOPATHIC PROGRESSIVE NEUROPATHY: ICD-10-CM

## 2021-01-04 DIAGNOSIS — I48.20 CHRONIC ATRIAL FIBRILLATION (H): ICD-10-CM

## 2021-01-04 DIAGNOSIS — Z79.01 LONG TERM CURRENT USE OF ANTICOAGULANT THERAPY: ICD-10-CM

## 2021-01-04 RX ORDER — GABAPENTIN 300 MG/1
600 CAPSULE ORAL 3 TIMES DAILY
Qty: 540 CAPSULE | Refills: 3 | Status: SHIPPED | OUTPATIENT
Start: 2021-01-04 | End: 2021-01-04

## 2021-01-04 RX ORDER — GABAPENTIN 300 MG/1
600 CAPSULE ORAL 3 TIMES DAILY
Qty: 540 CAPSULE | Refills: 3 | Status: SHIPPED | OUTPATIENT
Start: 2021-01-04 | End: 2021-08-11

## 2021-01-05 RX ORDER — WARFARIN SODIUM 5 MG/1
TABLET ORAL
Qty: 78 TABLET | Refills: 0 | Status: SHIPPED | OUTPATIENT
Start: 2021-01-05 | End: 2021-04-02

## 2021-01-12 ENCOUNTER — MYC MEDICAL ADVICE (OUTPATIENT)
Dept: PEDIATRICS | Facility: CLINIC | Age: 81
End: 2021-01-12

## 2021-01-19 ENCOUNTER — ANTICOAGULATION THERAPY VISIT (OUTPATIENT)
Dept: NURSING | Facility: CLINIC | Age: 81
End: 2021-01-19

## 2021-01-19 DIAGNOSIS — I48.20 CHRONIC ATRIAL FIBRILLATION (H): ICD-10-CM

## 2021-01-19 DIAGNOSIS — Z79.01 LONG TERM CURRENT USE OF ANTICOAGULANT THERAPY: ICD-10-CM

## 2021-01-19 LAB
CAPILLARY BLOOD COLLECTION: NORMAL
INR PPP: 2.5 (ref 0.86–1.14)

## 2021-01-19 PROCEDURE — 85610 PROTHROMBIN TIME: CPT | Performed by: INTERNAL MEDICINE

## 2021-01-19 PROCEDURE — 36416 COLLJ CAPILLARY BLOOD SPEC: CPT | Performed by: INTERNAL MEDICINE

## 2021-01-19 PROCEDURE — 99207 PR NO CHARGE NURSE ONLY: CPT

## 2021-01-19 NOTE — PROGRESS NOTES
ANTICOAGULATION FOLLOW-UP     Patient Name:  [facility]colm [facility]  Date:  2021  Contact Type:  Telephone    SUBJECTIVE:  Patient Findings     Comments:  The patient was assessed for   diet, medication,   missed or extra doses,   bruising or bleeding,   with no problem findings.  No questions or concerns.  Reviewed previous warfarin dosing with patient.  He took warfarin as instructed.    INR is therapeutic today.   Patient will continue same maintenance dose.   Follow up in 6 weeks.            Clinical Outcomes     Comments:  The patient was assessed for   diet, medication,   missed or extra doses,   bruising or bleeding,   with no problem findings.  No questions or concerns.  Reviewed previous warfarin dosing with patient.  He took warfarin as instructed.    INR is therapeutic today.   Patient will continue same maintenance dose.   Follow up in 6 weeks.               OBJECTIVE    Recent labs: (last 7 days)     21  1302   INR 2.50*       ASSESSMENT / PLAN  INR assessment THER    Recheck INR In: 6 WEEKS    INR Location Clinic lab     Anticoagulation Summary  As of 2021    INR goal:  2.0-3.0   TTR:  100.0 % (1 y)   INR used for dosin.50 (2021)   Warfarin maintenance plan:  2.5 mg (5 mg x 0.5) every Wed, Sat; 5 mg (5 mg x 1) all other days   Full warfarin instructions:  2.5 mg every Wed, Sat; 5 mg all other days   Weekly warfarin total:  30 mg   No change documented:  Vianca Kent RN   Plan last modified:  Mandi Phoenix RN (6/10/2016)   Next INR check:  3/2/2021   Priority:  Maintenance   Target end date:  Indefinite    Indications    Long term current use of anticoagulant therapy [Z79.01]  Chronic atrial fibrillation (H) [I48.20]             Anticoagulation Episode Summary     INR check location:      Preferred lab:      Send INR reminders to:  JOHNNIE LACEY    Comments:  5mg tabs // APPT CARD // cell phone [phone]      Anticoagulation Care Providers     Provider Role Specialty  Phone number    Leisa Mistry MD Referring Internal Medicine - Pediatrics [phone]    Tod Callahan MD  Internal Medicine - Pediatrics [phone]            See the Encounter Report to view Anticoagulation Flowsheet and Dosing Calendar (Go to Encounters tab in chart review, and find the Anticoagulation Therapy Visit)        Vianca Kent RN

## 2021-01-30 ENCOUNTER — IMMUNIZATION (OUTPATIENT)
Dept: NURSING | Facility: CLINIC | Age: 81
End: 2021-01-30
Payer: COMMERCIAL

## 2021-01-30 PROCEDURE — 91300 PR COVID VAC PFIZER DIL RECON 30 MCG/0.3 ML IM: CPT

## 2021-01-30 PROCEDURE — 0001A PR COVID VAC PFIZER DIL RECON 30 MCG/0.3 ML IM: CPT

## 2021-02-16 ENCOUNTER — TELEPHONE (OUTPATIENT)
Dept: PEDIATRICS | Facility: CLINIC | Age: 81
End: 2021-02-16

## 2021-02-16 NOTE — TELEPHONE ENCOUNTER
Forms/Letter Request    Name of form/letter: [place] DEPT OF PUBLIC [employer]AF[employer]Y    Have you been seen for this request: No    Do we have the form/letter: Yes: Dr Callahan    [facility]    How would you like the form/letter returned: Fax    Patient Notified form requests are processed in 3-5 business days:Yes    Okay to leave a detailed message? Yes Cell number on file:    Telephone Information:   Mobile [phone]

## 2021-02-19 NOTE — TELEPHONE ENCOUNTER
Form faxed to [phone], copy placed in pt.'s chart and original sent back to pt.   Yaz Martinez MA on 2/19/2021 at 9:52 AM

## 2021-02-20 ENCOUNTER — IMMUNIZATION (OUTPATIENT)
Dept: NURSING | Facility: CLINIC | Age: 81
End: 2021-02-20
Attending: SPECIALIST
Payer: COMMERCIAL

## 2021-02-20 PROCEDURE — 91300 PR COVID VAC PFIZER DIL RECON 30 MCG/0.3 ML IM: CPT

## 2021-02-20 PROCEDURE — 0002A PR COVID VAC PFIZER DIL RECON 30 MCG/0.3 ML IM: CPT

## 2021-03-02 ENCOUNTER — ANTICOAGULATION THERAPY VISIT (OUTPATIENT)
Dept: NURSING | Facility: CLINIC | Age: 81
End: 2021-03-02

## 2021-03-02 DIAGNOSIS — I48.20 CHRONIC ATRIAL FIBRILLATION (H): ICD-10-CM

## 2021-03-02 DIAGNOSIS — Z79.01 LONG TERM CURRENT USE OF ANTICOAGULANT THERAPY: ICD-10-CM

## 2021-03-02 DIAGNOSIS — I25.5 ISCHEMIC CARDIOMYOPATHY: ICD-10-CM

## 2021-03-02 DIAGNOSIS — Z12.5 SCREENING FOR PROSTATE CANCER: ICD-10-CM

## 2021-03-02 LAB
CAPILLARY BLOOD COLLECTION: NORMAL
ERYTHROCYTE [DISTWIDTH] IN BLOOD BY AUTOMATED COUNT: 12.5 % (ref 10–15)
HCT VFR BLD AUTO: 39.3 % (ref 40–53)
HGB BLD-MCNC: 12.7 G/DL (ref 13.3–17.7)
INR PPP: 1.9 (ref 0.86–1.14)
MCH RBC QN AUTO: 32.8 PG (ref 26.5–33)
MCHC RBC AUTO-ENTMCNC: 32.3 G/DL (ref 31.5–36.5)
MCV RBC AUTO: 102 FL (ref 78–100)
PLATELET # BLD AUTO: 202 10E9/L (ref 150–450)
RBC # BLD AUTO: 3.87 10E12/L (ref 4.4–5.9)
WBC # BLD AUTO: 7.3 10E9/L (ref 4–11)

## 2021-03-02 PROCEDURE — 85610 PROTHROMBIN TIME: CPT | Performed by: INTERNAL MEDICINE

## 2021-03-02 PROCEDURE — 99207 PR NO CHARGE NURSE ONLY: CPT

## 2021-03-02 PROCEDURE — 85027 COMPLETE CBC AUTOMATED: CPT | Performed by: INTERNAL MEDICINE

## 2021-03-02 PROCEDURE — 36416 COLLJ CAPILLARY BLOOD SPEC: CPT | Performed by: INTERNAL MEDICINE

## 2021-03-02 PROCEDURE — G0103 PSA SCREENING: HCPCS | Performed by: INTERNAL MEDICINE

## 2021-03-03 DIAGNOSIS — D64.9 ANEMIA, UNSPECIFIED TYPE: Primary | ICD-10-CM

## 2021-03-03 LAB — PSA SERPL-ACNC: 3.96 UG/L (ref 0–4)

## 2021-03-03 NOTE — PROGRESS NOTES
ANTICOAGULATION FOLLOW-UP     [facility]  Date:  3/2/2021  Contact Type:  Telephone    SUBJECTIVE:  Patient Findings     Positives:  Change in alcohol use (Used to have one drink every day, now cut down to 3 days a week. Trying to lose weight.)    Comments:  Reviewed previous warfarin dosing with patient.  He took warfarin as instructed.  No missed doses,   No increased intake of green vegetables,   No medication changes,   No bleeding/bruising,   No signs/symptoms of a clot.    INR is subtherapeutic today.   Patient will increase weekly maintenance dose total by 8.3%.   Follow up in 4 weeks.             Clinical Outcomes     Comments:  Reviewed previous warfarin dosing with patient.  He took warfarin as instructed.  No missed doses,   No increased intake of green vegetables,   No medication changes,   No bleeding/bruising,   No signs/symptoms of a clot.    INR is subtherapeutic today.   Patient will increase weekly maintenance dose total by 8.3%.   Follow up in 4 weeks.                OBJECTIVE    Recent labs: (last 7 days)     21  1306   INR 1.90*       ASSESSMENT / PLAN  INR assessment SUB    Recheck INR In: 4 WEEKS    INR Location Clinic lab     Anticoagulation Summary  As of 3/2/2021    INR goal:  2.0-3.0   TTR:  98.1 % (1 y)   INR used for dosin.90 (3/2/2021)   Warfarin maintenance plan:  2.5 mg (5 mg x 0.5) every Sat; 5 mg (5 mg x 1) all other days   Full warfarin instructions:  2.5 mg every Sat; 5 mg all other days   Weekly warfarin total:  32.5 mg   Plan last modified:  Vianca Kent RN (3/2/2021)   Next INR check:  3/30/2021   Priority:  Maintenance   Target end date:  Indefinite    Indications    Long term current use of anticoagulant therapy [Z79.01]  Chronic atrial fibrillation (H) [I48.20]             Anticoagulation Episode Summary     INR check location:      Preferred lab:      Send INR reminders to:  JOHNNIE LACEY    Comments:  5mg tabs // APPT CARD //  cell phone [phone]      Anticoagulation Care Providers     Provider Role Specialty Phone number    Leisa Mistry MD Referring Internal Medicine - Pediatrics [phone]    Tod Callahan MD  Internal Medicine - Pediatrics [phone]            See the Encounter Report to view Anticoagulation Flowsheet and Dosing Calendar (Go to Encounters tab in chart review, and find the Anticoagulation Therapy Visit)        Dosage adjustment made based on physician directed care plan.      Vianca Kent RN

## 2021-03-11 ENCOUNTER — TELEPHONE (OUTPATIENT)
Dept: PEDIATRICS | Facility: CLINIC | Age: 81
End: 2021-03-11

## 2021-03-11 DIAGNOSIS — Z79.01 LONG TERM CURRENT USE OF ANTICOAGULANT THERAPY: ICD-10-CM

## 2021-03-11 DIAGNOSIS — I48.20 CHRONIC ATRIAL FIBRILLATION (H): ICD-10-CM

## 2021-03-11 NOTE — TELEPHONE ENCOUNTER
Patient left a VM stating he has question on Coumadin dosing. Please call back when able.  Thank you.  Mariia Marcos RN  Anticoagulation Nurse - [facility]

## 2021-03-11 NOTE — TELEPHONE ENCOUNTER
Spoke to patient by phone.  He forgot to take all his evening meds on Tuesday.  Supposed to take warfarin 5 mg every day except 2.5 mg on Saturdays.  Recommended he take 5 mg this coming Saturday and then go back to his usual maintenance dosing.  He agreed with this plan and will come in for an INR as scheduled.  Anticoagulation calendar updated.  He is in [place] right now.    [facility]

## 2021-03-12 ENCOUNTER — MYC MEDICAL ADVICE (OUTPATIENT)
Dept: PEDIATRICS | Facility: CLINIC | Age: 81
End: 2021-03-12

## 2021-03-15 NOTE — TELEPHONE ENCOUNTER
TC- Please assist patient with scheduling appointment to evaluate foot. Please see MC message for available dates from the patient.    Mandi SORIANO RN

## 2021-03-17 ENCOUNTER — OFFICE VISIT (OUTPATIENT)
Dept: PEDIATRICS | Facility: CLINIC | Age: 81
End: 2021-03-17
Payer: COMMERCIAL

## 2021-03-17 ENCOUNTER — ANCILLARY PROCEDURE (OUTPATIENT)
Dept: GENERAL RADIOLOGY | Facility: CLINIC | Age: 81
End: 2021-03-17
Attending: INTERNAL MEDICINE
Payer: COMMERCIAL

## 2021-03-17 VITALS
DIASTOLIC BLOOD PRESSURE: 68 MMHG | BODY MASS INDEX: 29.35 KG/M2 | TEMPERATURE: 99.3 F | SYSTOLIC BLOOD PRESSURE: 104 MMHG | OXYGEN SATURATION: 99 % | HEART RATE: 61 BPM | HEIGHT: 72 IN | WEIGHT: 216.7 LBS

## 2021-03-17 DIAGNOSIS — M77.41 METATARSALGIA OF RIGHT FOOT: Primary | ICD-10-CM

## 2021-03-17 DIAGNOSIS — M77.41 METATARSALGIA OF RIGHT FOOT: ICD-10-CM

## 2021-03-17 PROCEDURE — 99213 OFFICE O/P EST LOW 20 MIN: CPT | Mod: GE | Performed by: STUDENT IN AN ORGANIZED HEALTH CARE EDUCATION/TRAINING PROGRAM

## 2021-03-17 PROCEDURE — 73630 X-RAY EXAM OF FOOT: CPT | Mod: RT | Performed by: RADIOLOGY

## 2021-03-17 ASSESSMENT — MIFFLIN-ST. JEOR: SCORE: 1723

## 2021-03-17 NOTE — PATIENT INSTRUCTIONS
- Voltaren Gel   - use up to 4 times a day  - rub well into foot  - wash hands well after applying    - if Xray is concerning, would have referral to orthopedics  - if no findings and pain persists despite voltaren gel, can place podiatry consult

## 2021-03-17 NOTE — PROGRESS NOTES
Assessment & Plan     Metatarsalgia of right foot  Unclear etiology of current pain.  Occurs sporadically without a clear identified trigger.  No overt musculoskeletal or osseous deformity appreciated on x-ray.  No recent trauma.  Trial of Voltaren gel.  As well as podiatry referral for further management of potential metatarsalgia.  - XR Foot Right G/E 3 Views  - Orthopedic & Spine  Referral  - OTC voltaren            Return for Routine preventive.    Patient discussed with staff attending physician Dr. Fidelia Harrington MD  [facility]    I have discussed the patient with the resident and was available to furnish services throughout the visit.  I agree with the history, physical and plan as documented above.    Leisa Mistry MD  Internal Medicine-Pediatrics      Subjective   Jaskaran is a 81 year old who presents for the following health issues ischemic cardiomyopathy, heart failure reduced ejection fraction last EF less than 20%, status post ICD placement for MI complicated by V. fib stage III CKD, seen in clinic today for right dorsal foot pain.    Pain started approximately 2 months prior to presentation.  Notes that it is most pronounced along the dorsal medial aspect of his foot and will track proximally along the surface of the skin towards the ankle and up senior care through the leg.  Sporadic when it occurs.  No clear triggers.  Completely resolves in between episodes.  Episodes last variable amounts of time usually a matter of seconds.  However can persist for minutes.  Pain is sharp/achy but not shooting or tingling like his neuropathic pain.  No recent trauma to the area.  No noted swelling of the area.  No skin changes over the area.    HPI     Musculoskeletal problem/pain  Onset/Duration: x 2 months   Description  Location: foot - right  Joint Swelling: no  Redness: no  Pain: YES  Warmth: no  Intensity:  mild  Progression of Symptoms:  same  Accompanying  "signs and symptoms:   Fevers: no  Numbness/tingling/weakness: YES has neuropathy   History  Trauma to the area: no  Recent illness:  no  Previous similar problem: no  Previous evaluation:  no  Precipitating or alleviating factors:  Aggravating factors include: flexing the foot in either direction   Therapies tried and outcome: heat and icy hot ointment       Review of Systems   Constitutional, HEENT, cardiovascular, pulmonary, gi and gu systems are negative, except as otherwise noted.      Objective    /68 (BP Location: Right arm, Patient Position: Chair, Cuff Size: Adult Large)   Pulse 61   Temp 99.3  F (37.4  C) (Tympanic)   Ht 1.816 m (5' 11.5\")   Wt 98.3 kg (216 lb 11.2 oz)   SpO2 99%   BMI 29.80 kg/m    Body mass index is 29.8 kg/m .  Physical Exam   GENERAL: healthy, alert and no distress  NECK: no adenopathy, no asymmetry, masses, or scars  RESP: on RA, no WOB  CV: regular rate and rhythm, S1/S2 with prolonged systolic phase, 2/6 systolic murmur, no LE edema  ABDOMEN: soft, nontender, no hepatosplenomegaly, no masses and bowel sounds normal  MS: Right foot: No erythema ecchymosis or edema over plantar dorsal surface.  Neurovascularly intact.  DP and TP pulse +2.  No joint laxity appreciated on lateral medial malleolus or calcaneal anterior / posterior motion.  No pain elicited with full passive range of motion.  Unable to elicit pain with active range of motion or resistance.  Able to ambulate.  Pain triggered sporadically with weightbearing.    RIGHT FOOT XR 3 VIEWS 3/17/21:  PER RADS:  IMPRESSION: No fracture or osseous lesion. Normal joint spacing. Small  plantar calcaneal spur.             "

## 2021-03-22 LAB — EJECTION FRACTION: 27 %

## 2021-03-25 ENCOUNTER — OFFICE VISIT (OUTPATIENT)
Dept: PODIATRY | Facility: CLINIC | Age: 81
End: 2021-03-25
Attending: INTERNAL MEDICINE
Payer: COMMERCIAL

## 2021-03-25 VITALS
DIASTOLIC BLOOD PRESSURE: 60 MMHG | HEIGHT: 72 IN | SYSTOLIC BLOOD PRESSURE: 108 MMHG | WEIGHT: 216 LBS | BODY MASS INDEX: 29.26 KG/M2

## 2021-03-25 DIAGNOSIS — G60.3 IDIOPATHIC PROGRESSIVE NEUROPATHY: ICD-10-CM

## 2021-03-25 DIAGNOSIS — M79.671 RIGHT FOOT PAIN: Primary | ICD-10-CM

## 2021-03-25 PROCEDURE — 99203 OFFICE O/P NEW LOW 30 MIN: CPT | Performed by: PODIATRIST

## 2021-03-25 RX ORDER — LISINOPRIL 5 MG/1
TABLET ORAL
COMMUNITY
Start: 2021-03-13 | End: 2023-01-01

## 2021-03-25 ASSESSMENT — MIFFLIN-ST. JEOR: SCORE: 1719.83

## 2021-03-25 NOTE — PATIENT INSTRUCTIONS
[place] ORTHOTICS LOCATIONS  [facility]  [address]  [place] [address]  Phone: [phone]  Fax: [phone] [facility]  [address]  [place] [address]  Phone: [phone]   Fax: [phone]   [facility]  [address]  [place] [address]  Phone: [phone]  Fax: [phone] [facility]  [address]  [place] [address]  Phone: [phone]   Fax: [phone]   [facility]   [address]  [place] [address]  Phone: [phone]  Fax: [phone] * Please call any location listed to make an appointment for a casting/fitting. Your referral was sent to their central office and they will all have the order on file.

## 2021-03-25 NOTE — LETTER
3/25/2021         RE: Malcolm Barker  [address] [place] [address]        Dear Colleague,    Thank you for referring your patient, Malcolm Barker, to the [facility]. Please see a copy of my visit note below.    ASSESSMENT:  Encounter Diagnoses   Name Primary?     Right foot pain Yes     Idiopathic progressive neuropathy        MEDICAL DECISION MAKING:  I am not sure what is causing his pain.  If it stems from the foot, it is likely related to his higher arch foot and midfoot joints.  This could involve ligamentous strain.  I do not have a suspicion for tibialis anterior tendinitis or other extensor tendon issues.    If the pain is mechanical in nature, better support and appropriate shoe should help.  Referred him for custom multidensity orthoses.    He describes discomfort that radiates down his leg to his foot.  This occurs after periods of sitting.  A question if there is a neurological cause.    He is referred to neurology for an opinion.    He is asked to follow-up if the pain does not improve, and certainly if it worsens.    Disclaimer: This note consists of symbols derived from keyboarding, dictation and/or voice recognition software. As a result, there may be errors in the script that have gone undetected. Please consider this when interpreting information found in this chart.    Wally Sanchez DPM, FACFAS, [facility] Department of Podiatry/Foot & Ankle Surgery      ____________________________________________________________________    HPI:       I was asked by Dr. Munoz to evaluate Malcolm Barker in consultation for metatarsalgia of his right foot.     Chief Complaint: recurring pain on top of right foot  Onset of problem: 3 months  Pain/ discomfort is described as:  Burning, throbbing  Pain Ratin/10   Frequency:  constant    The pain is exacerbated after periods of sitting  Previous treatment: Voltaren cream provided some  relief.   He describes a pain that radiates down his anterior leg to his foot. This is after periods of sitting.  He reports sitting in chairs in his kitchen, watching TV, for 4-5 hours at a time.     Past Medical History:   Diagnosis Date     Contracture of palmar fascia     both hands, mild     Esophageal reflux      Essential hypertension, benign      Generalized osteoarthrosis, unspecified site     L shoulder, r hip..  improved now with exercise, glucosamine     Glaucoma      Hernia, abdominal      Hypertrophy (benign) of prostate     mild slowing   *  *  Past Surgical History:   Procedure Laterality Date     [facility]     [facility]OSCOPY  4/24/2013    Procedure: COLONOSCOPY;  Colonoscopy        ENT SURGERY      T&A as a child     EYE SURGERY      lright eye lens replacement     HERNIA REPAIR       IMPLANT AUTOMATIC IMPLANTABLE CARDIOVERTER DEFIBRILLATOR  2/2014     [facility] NONSPECIFIC PROCEDURE  5/04    colonoscopy      [facility] NONSPECIFIC PROCEDURE  1993    R hernia   *  *  Current Outpatient Medications   Medication Sig Dispense Refill     diclofenac (VOLTAREN) 1 % topical gel Apply topically 4 times daily       atorvastatin (LIPITOR) 40 MG tablet Take 40 mg by mouth daily       Bromfenac Sodium (BROMDAY) 0.09 % SOLN        bumetanide (BUMEX) 1 MG tablet Take 1 mg by mouth daily 2 in PM       carvedilol (COREG) 3.125 MG tablet Take 3.125 mg by mouth 2 times daily (with meals)       Cholecalciferol (VITAMIN D) 1000 UNITS capsule Take 1 capsule by mouth daily       clopidogrel (PLAVIX) 75 MG tablet Take 1 tablet by mouth daily       Coenzyme Q10 (COQ10) 200 MG CAPS Take by mouth daily       fluticasone (FLONASE) 50 MCG/ACT nasal spray Spray 1-2 sprays into both nostrils daily 1 Package 11     gabapentin (NEURONTIN) 300 MG capsule Take 2 capsules (600 mg) by mouth 3 times daily 540 capsule 3     Glucosamine-Chondroit-Vit C-Mn (GLUCOSAMINE CHONDR 1500 COMPLX PO) Take by mouth daily    "    Latanoprost (XALATAN OP) Apply 1 drop to eye daily       latanoprost (XALATAN) 0.005 % ophthalmic solution        lisinopril (PRINIVIL,ZESTRIL) 2.5 MG tablet Take 5 mg by mouth daily.        lisinopril (ZESTRIL) 5 MG tablet TAKE 1 TABLET BY MOUTH ONCE DAILY AT BEDTIME       Magnesium Oxide 250 MG TABS Take by mouth daily       metoprolol (TOPROL-XL) 25 MG 24 hr tablet Take 25 mg by mouth daily 2 tablets daily       Multiple Vitamin (MULTIVITAMIN OR) Take  by mouth.       ofloxacin (OCUFLOX) 0.3 % ophthalmic solution        omeprazole (PRILOSEC) 20 MG CR capsule        potassium chloride (KLOR-CON) 20 MEQ packet Take 20 mEq by mouth 2 times daily       potassium chloride SA (K-DUR/KLOR-CON M) 20 MEQ CR tablet Take 1 tablet twice daily. Take an additional 20 meq on days of Metolazone per Maria E Johnson 9/17/14       sildenafil (REVATIO) 20 MG tablet 2-5 tabs po QD prn 30 tablet 11     timolol (TIMOPTIC) 0.5 % ophthalmic solution Place 1 drop into the right eye daily       warfarin ANTICOAGULANT (COUMADIN) 5 MG tablet TAKE 1/2 (ONE-HALF) TABLET BY MOUTH ON WED, SATURDAYS  AND 1 (ONE) TAB ON SUN, MON, TUES, THURS AND FRIDAYS OR AS DIRECTED BY INR CLINIC 78 tablet 0         EXAM:    Vitals: /60   Ht 1.816 m (5' 11.5\")   Wt 98 kg (216 lb)   BMI 29.71 kg/m    BMI: Body mass index is 29.71 kg/m .    Constitutional:  Malcolm Barker is in no apparent distress, appears well-nourished.  Cooperative with history and physical exam.    Vascular:  Pedal pulses are palpable for both the DP and PT arteries.  CFT < 3 sec.  No edema.      Neuro: Light touch sensation is intact to the L4, L5, S1 distributions  No evidence of weakness, spasticity, or contracture in the lower extremities.   No pain with percussion over the anterior ankle, dorsal foot.     Derm: Normal texture and turgor.  No erythema, ecchymosis, or cyanosis.  No open lesions.     Musculoskeletal:    Lower extremity muscle strength is normal. No gross " deformities.  Pain on palpation: no. No pain with stressing the right foot extensor tendons.  High arch foot structure with some midfoot pronation upon weightbearing.    X-Ray Findings:  I personally reviewed the right foot images.    XR FOOT RT G/E 3 VW 3/17/2021 3:59 PM      HISTORY: Right medial metatarsal pain for 2 months, sporadic, no joint  laxity, no trauma; Metatarsalgia of right foot     COMPARISON: None.                                                                      IMPRESSION: No fracture or osseous lesion. Normal joint spacing. Small  plantar calcaneal spur.      JC ANDERSEN MD            Again, thank you for allowing me to participate in the care of your patient.        Sincerely,        Wally Sanchez DPM

## 2021-03-25 NOTE — PROGRESS NOTES
ASSESSMENT:  Encounter Diagnoses   Name Primary?     Right foot pain Yes     Idiopathic progressive neuropathy        MEDICAL DECISION MAKING:  I am not sure what is causing his pain.  If it stems from the foot, it is likely related to his higher arch foot and midfoot joints.  This could involve ligamentous strain.  I do not have a suspicion for tibialis anterior tendinitis or other extensor tendon issues.    If the pain is mechanical in nature, better support and appropriate shoe should help.  Referred him for custom multidensity orthoses.    He describes discomfort that radiates down his leg to his foot.  This occurs after periods of sitting.  A question if there is a neurological cause.    He is referred to neurology for an opinion.    He is asked to follow-up if the pain does not improve, and certainly if it worsens.    Disclaimer: This note consists of symbols derived from keyboarding, dictation and/or voice recognition software. As a result, there may be errors in the script that have gone undetected. Please consider this when interpreting information found in this chart.    Wally Sanchez, CARINE, FACFAS, MS    [facility] Department of Podiatry/Foot & Ankle Surgery      ____________________________________________________________________    HPI:       I was asked by Dr. Munoz to evaluate Malcolm Barker in consultation for metatarsalgia of his right foot.     Chief Complaint: recurring pain on top of right foot  Onset of problem: 3 months  Pain/ discomfort is described as:  Burning, throbbing  Pain Ratin/10   Frequency:  constant    The pain is exacerbated after periods of sitting  Previous treatment: Voltaren cream provided some relief.   He describes a pain that radiates down his anterior leg to his foot. This is after periods of sitting.  He reports sitting in chairs in his kitchen, watching TV, for 4-5 hours at a time.     Past Medical History:   Diagnosis Date     Contracture of palmar fascia      both hands, mild     Esophageal reflux      Essential hypertension, benign      Generalized osteoarthrosis, unspecified site     L shoulder, r hip..  improved now with exercise, glucosamine     Glaucoma      Hernia, abdominal      Hypertrophy (benign) of prostate     mild slowing   *  *  Past Surgical History:   Procedure Laterality Date     [facility]     [facility]OSCOPY  4/24/2013    Procedure: COLONOSCOPY;  Colonoscopy        ENT SURGERY      T&A as a child     EYE SURGERY      lright eye lens replacement     HERNIA REPAIR       IMPLANT AUTOMATIC IMPLANTABLE CARDIOVERTER DEFIBRILLATOR  2/2014     [facility] NONSPECIFIC PROCEDURE  5/04    colonoscopy      [facility] NONSPECIFIC PROCEDURE  1993    R hernia   *  *  Current Outpatient Medications   Medication Sig Dispense Refill     diclofenac (VOLTAREN) 1 % topical gel Apply topically 4 times daily       atorvastatin (LIPITOR) 40 MG tablet Take 40 mg by mouth daily       Bromfenac Sodium (BROMDAY) 0.09 % SOLN        bumetanide (BUMEX) 1 MG tablet Take 1 mg by mouth daily 2 in PM       carvedilol (COREG) 3.125 MG tablet Take 3.125 mg by mouth 2 times daily (with meals)       Cholecalciferol (VITAMIN D) 1000 UNITS capsule Take 1 capsule by mouth daily       clopidogrel (PLAVIX) 75 MG tablet Take 1 tablet by mouth daily       Coenzyme Q10 (COQ10) 200 MG CAPS Take by mouth daily       fluticasone (FLONASE) 50 MCG/ACT nasal spray Spray 1-2 sprays into both nostrils daily 1 Package 11     gabapentin (NEURONTIN) 300 MG capsule Take 2 capsules (600 mg) by mouth 3 times daily 540 capsule 3     Glucosamine-Chondroit-Vit C-Mn (GLUCOSAMINE CHONDR 1500 COMPLX PO) Take by mouth daily       Latanoprost (XALATAN OP) Apply 1 drop to eye daily       latanoprost (XALATAN) 0.005 % ophthalmic solution        lisinopril (PRINIVIL,ZESTRIL) 2.5 MG tablet Take 5 mg by mouth daily.        lisinopril (ZESTRIL) 5 MG tablet TAKE 1 TABLET BY MOUTH ONCE DAILY AT BEDTIME    "    Magnesium Oxide 250 MG TABS Take by mouth daily       metoprolol (TOPROL-XL) 25 MG 24 hr tablet Take 25 mg by mouth daily 2 tablets daily       Multiple Vitamin (MULTIVITAMIN OR) Take  by mouth.       ofloxacin (OCUFLOX) 0.3 % ophthalmic solution        omeprazole (PRILOSEC) 20 MG CR capsule        potassium chloride (KLOR-CON) 20 MEQ packet Take 20 mEq by mouth 2 times daily       potassium chloride SA (K-DUR/KLOR-CON M) 20 MEQ CR tablet Take 1 tablet twice daily. Take an additional 20 meq on days of Metolazone per Maria E Johnson 9/17/14       sildenafil (REVATIO) 20 MG tablet 2-5 tabs po QD prn 30 tablet 11     timolol (TIMOPTIC) 0.5 % ophthalmic solution Place 1 drop into the right eye daily       warfarin ANTICOAGULANT (COUMADIN) 5 MG tablet TAKE 1/2 (ONE-HALF) TABLET BY MOUTH ON WED, SATURDAYS  AND 1 (ONE) TAB ON SUN, MON, TUES, THURS AND FRIDAYS OR AS DIRECTED BY INR CLINIC 78 tablet 0         EXAM:    Vitals: /60   Ht 1.816 m (5' 11.5\")   Wt 98 kg (216 lb)   BMI 29.71 kg/m    BMI: Body mass index is 29.71 kg/m .    Constitutional:  Malcolm Barker is in no apparent distress, appears well-nourished.  Cooperative with history and physical exam.    Vascular:  Pedal pulses are palpable for both the DP and PT arteries.  CFT < 3 sec.  No edema.      Neuro: Light touch sensation is intact to the L4, L5, S1 distributions  No evidence of weakness, spasticity, or contracture in the lower extremities.   No pain with percussion over the anterior ankle, dorsal foot.     Derm: Normal texture and turgor.  No erythema, ecchymosis, or cyanosis.  No open lesions.     Musculoskeletal:    Lower extremity muscle strength is normal. No gross deformities.  Pain on palpation: no. No pain with stressing the right foot extensor tendons.  High arch foot structure with some midfoot pronation upon weightbearing.    X-Ray Findings:  I personally reviewed the right foot images.    XR FOOT RT G/E 3 VW 3/17/2021 3:59 PM "      HISTORY: Right medial metatarsal pain for 2 months, sporadic, no joint  laxity, no trauma; Metatarsalgia of right foot     COMPARISON: None.                                                                      IMPRESSION: No fracture or osseous lesion. Normal joint spacing. Small  plantar calcaneal spur.      JC ANDERSEN MD

## 2021-03-30 ENCOUNTER — ANTICOAGULATION THERAPY VISIT (OUTPATIENT)
Dept: NURSING | Facility: CLINIC | Age: 81
End: 2021-03-30

## 2021-03-30 DIAGNOSIS — I48.20 CHRONIC ATRIAL FIBRILLATION (H): ICD-10-CM

## 2021-03-30 DIAGNOSIS — Z79.01 LONG TERM CURRENT USE OF ANTICOAGULANT THERAPY: ICD-10-CM

## 2021-03-30 LAB
CAPILLARY BLOOD COLLECTION: NORMAL
INR PPP: 2.7 (ref 0.86–1.14)

## 2021-03-30 PROCEDURE — 99207 PR NO CHARGE NURSE ONLY: CPT

## 2021-03-30 PROCEDURE — 36416 COLLJ CAPILLARY BLOOD SPEC: CPT | Performed by: INTERNAL MEDICINE

## 2021-03-30 PROCEDURE — 85610 PROTHROMBIN TIME: CPT | Performed by: INTERNAL MEDICINE

## 2021-03-30 NOTE — PROGRESS NOTES
ANTICOAGULATION FOLLOW-UP     Patient Name:  Malcolm [facility]ENDERSON [facility]  Date:  3/30/2021  Contact Type:  Telephone    SUBJECTIVE:  Patient Findings     Comments:  The patient was assessed for   diet, medication,   missed or extra doses,   bruising or bleeding,   with no problem findings.  No questions or concerns.  Reviewed previous warfarin dosing with patient.  He took warfarin as instructed.    INR is therapeutic today.   Patient will continue same maintenance dose.   Follow up in 6 weeks.      .          Clinical Outcomes     Comments:  The patient was assessed for   diet, medication,   missed or extra doses,   bruising or bleeding,   with no problem findings.  No questions or concerns.  Reviewed previous warfarin dosing with patient.  He took warfarin as instructed.    INR is therapeutic today.   Patient will continue same maintenance dose.   Follow up in 6 weeks.      .             OBJECTIVE    Recent labs: (last 7 days)     21  1252   INR 2.70*       ASSESSMENT / PLAN  INR assessment THER    Recheck INR In: 6 WEEKS    INR Location Clinic lab     Anticoagulation Summary  As of 3/30/2021    INR goal:  2.0-3.0   TTR:  97.1 % (1 y)   INR used for dosin.70 (3/30/2021)   Warfarin maintenance plan:  2.5 mg (5 mg x 0.5) every Sat; 5 mg (5 mg x 1) all other days   Full warfarin instructions:  2.5 mg every Sat; 5 mg all other days   Weekly warfarin total:  32.5 mg   No change documented:  Vianca Kent RN   Plan last modified:  Vianca Kent RN (3/2/2021)   Next INR check:  2021   Priority:  Maintenance   Target end date:  Indefinite    Indications    Long term current use of anticoagulant therapy [Z79.01]  Chronic atrial fibrillation (H) [I48.20]             Anticoagulation Episode Summary     INR check location:      Preferred lab:      Send INR reminders to:  JOHNNIE LACEY    Comments:  5mg tabs // APPT CARD // cell phone [phone]      Anticoagulation Care Providers     Provider Role  Specialty Phone number    Leisa Mistry MD Referring Internal Medicine - Pediatrics [phone]    Tod Callahan MD  Internal Medicine - Pediatrics [phone]            See the Encounter Report to view Anticoagulation Flowsheet and Dosing Calendar (Go to Encounters tab in chart review, and find the Anticoagulation Therapy Visit)        Vianca Kent RN

## 2021-04-01 DIAGNOSIS — Z79.01 LONG TERM CURRENT USE OF ANTICOAGULANT THERAPY: ICD-10-CM

## 2021-04-01 DIAGNOSIS — I48.20 CHRONIC ATRIAL FIBRILLATION (H): ICD-10-CM

## 2021-04-02 RX ORDER — WARFARIN SODIUM 5 MG/1
TABLET ORAL
Qty: 90 TABLET | Refills: 0 | Status: SHIPPED | OUTPATIENT
Start: 2021-04-02 | End: 2022-01-01

## 2021-04-07 NOTE — PROGRESS NOTES
"INITIAL NEUROLOGY CONSULTATION    DATE OF VISIT: 4/8/2021  CLINIC LOCATION: [facility]  MRN: [id number]  PATIENT NAME: Malcolm Barker  YOB: 1940    PRIMARY CARE PROVIDER: Tod Callahan MD     REASON FOR VISIT:   Chief Complaint   Patient presents with     NEUROPATHY     HISTORY OF PRESENT ILLNESS:                                                    Mr. Malcolm Barker is 81 year old right handed male patient with past medical history of hypertension, congestive heart failure, chronic atrial fibrillation (on warfarin), and idiopathic polyneuropathy, who was seen in consultation today requested by Dr. Sanchez, D.P.M., for right foot pain/polyneuropathy.    Per patient's report and chart review, he was recently seen for constant pain on the top of right foot for the last 3 months that feels like a \"deep bruise\"and is up to 6/10 in intensity.  It is exacerbated by periods of prolonged sitting (4 to 5 hours while watching TV) in his kitchen.  There are no other associated symptoms, aggravating or alleviating factors.    For this problem the patient was seen at his primary care provider office and referred to Dr. Sanchez (podiatry) for suspicion of metatarsalgia.  It felt that it could be related to higher arch foot and midfoot joints, as well as ligamentous strain, but Dr. Sanchez referred him to neurology to evaluate for neurological causes.    The patient also has history of peripheral polyneuropathy for the last 15+ years.  It is gradually progressing.  He reports constant numbness and tingling along with intermittent sharp pains in both feet.  Lately, he also noticed that his fingertips have reduced sensation.  Walking and playing pickleball make his symptoms worse.  He did not notice any additional aggravating or alleviating factors.  Every day he has 2 alcoholic drinks and on occasion more than that.  He denies prior history of chemotherapy or diabetes.  He is on 600 " mg of gabapentin 3 times daily, which helps.  No side effects.    The patient was previously followed by Dr. Willett at the [facility] neurology clinic.  His EMG from January 2012 demonstrated sensory polyneuropathy along with absent right peroneal compound muscle action potential.  It was noted that the patient has past history of significant injury to the right ankle with associated severe swelling, and caution was indicated in attributing the absent peroneal motor response to the patient's polyneuropathy.    Recent laboratory evaluation from March 2021 demonstrated low hemoglobin (10.7), elevated MCV (102), and INR ranging from 1.9-2.7 (on warfarin).  Historically, his B12 levels were in normal range in 0680-7984.    X-ray of the right foot from 3/17/2021 demonstrated no fracture or osseous lesions, normal joint spacing, and small plantar calcaneal spur.  Images were personally reviewed and independently interpreted.  Cervical spine CT at that time demonstrated multilevel degenerative changes.    No additional useful information is available in Care Everywhere, which was reviewed.    Review of Systems - the patient endorses glaucoma (currently followed in ophthalmology clinic). Otherwise, he denies any other complaints on 14-point comprehensive review of systems.  PAST MEDICAL/SURGICAL HISTORY:                                                    I personally reviewed patient's past medical and surgical history with the patient at today's visit.  MEDICATIONS:                                                    I personally reviewed patient's medications and allergies with the patient at today's visit.  ALLERGIES:                                                      Allergies   Allergen Reactions     Carvedilol Other (See Comments)     fatigue     Dorzolamide Other (See Comments)     Other reaction(s): Unknown/Not Verified  Red irritated eye  Red irritated eye       Metoprolol Other (See Comments)     Other  "reaction(s): Hypotension     FAMILY/SOCIAL HISTORY:                                                    Family and social history was reviewed with the patient at today's visit.  No family history of neurological disorders.  Former smoker, quit in 1999.  2 alcohol drinks daily.  Denies recreational drug use.  .  Retired.  REVIEW OF SYSTEMS:                                                    Patient has completed a [facility] Patient Health History, including a 14-system review, which was personally reviewed, and pertinent positives are listed in HPI. He denies any additional problems on the further questioning.  EXAM:                                                    VITAL SIGNS:   BP 99/62 (BP Location: Left arm, Patient Position: Sitting, Cuff Size: Adult Large)   Pulse 55   Temp 97.6  F (36.4  C) (Oral)   Ht 1.822 m (5' 11.75\")   Wt 96.2 kg (212 lb)   SpO2 97%   BMI 28.95 kg/m      General: pt is in NAD, cooperative.  Skin: normal turgor, moist mucous membranes, no lesions/rashes noticed.  HEENT: ATNC, EOMI, PERRL, white sclera, normal conjunctiva, no nystagmus or ptosis. No carotid bruits bilaterally.  Respiratory: lung sounds clear to auscultation bilaterally, no crackles, wheezes, rhonchi. Symmetric lung excursion, no accessory respiratory muscle use.  Cardiovascular: normal S1/S2, no murmurs/rubs/gallops.   Abdomen: Not distended.  : deferred.    Neurological:  Mental: alert, follows commands, no aphasia or dysarthria. Fund of knowledge is appropriate for age.  Cranial Nerves:  CN II: visual acuity - able to accurately count fingers with each eye. Visual fields intact, fundi: discs sharp, no papilledema and normal vessels bilaterally.  CN III, IV, VI: EOM intact, pupils equal and reactive  CN V: facial sensation nl  CN VII: face symmetric, no facial droop  CN VIII: hearing normal  CN IX: palate elevation symmetric, uvula at midline  CN XI SCM normal, shoulder shrug nl  CN XII: tongue " midline  Motor: Strength: 5/5 in all major groups of all extremities. Normal tone. No abnormal movements. No pronator drift b/l.  Reflexes: Triceps, biceps, brachioradialis, and patellar reflexes are normal and symmetric, achilles reflexes are absent bilaterally. No clonus noted. Toes are down-going b/l.   Sensory:  light touch, pinprick, and vibration are reduced in both lower extremities 1 cm below his knees down including both feet. Romberg: negative.  Coordination: FNF and heel-shin tests intact b/l.   Gait:  Normal, but has difficulty with tandem walk.  DATA:   LABS/EMG/IMAGING/OTHER STUDIES: I reviewed pertinent medical records, including tabulated data from EMG report (with independent interpretation), prior neurology notes and Care Everywhere, as detailed in the history of present illness.  ASSESSMENT AND PLAN:      ASSESSMENT: Malcolm Barker is a 81 year old male patient with listed above past medical history, who presents with constant pain on the top of the right foot for the last several months exacerbated by prolonged sitting (4 to 5 hours) in context of gradually worsening chronic polyneuropathy.    We had a detailed discussion with the patient regarding his presenting complaints.  The neurological exam today is supportive of known diagnosis of peripheral polyneuropathy.    We reviewed common causes of neuropathy, that include diabetes, vitamin deficiencies, thyroid dysfunction, chemotherapy, and excessive alcohol use.  I counseled the patient to reduce his alcohol intake to 1 drink per day or less.  I would also like to check several labs for treatable causes of neuropathy, especially in light of recent elevated MCV and heavy alcohol use.  We discussed available treatment options for polyneuropathy, including typical range of doses for gabapentin, alternative treatments, and alpha lipoic acid.      In addition, we went over possible etiologies of his right foot pain, which certainly could be  musculoskeletal, but there might be a contribution from compression of the right medial dorsal cutaneous nerve due to mechanical pressure.  I advised the patient to avoid tight fitting shoes and prolonged pressure on the dorsum of the right foot to see if it helps his symptoms.  The rest of our discussion and the plan are summarized below.    DIAGNOSES:    ICD-10-CM    1. Right foot pain  M79.671    2. Idiopathic progressive neuropathy  G60.3      PLAN: At today's visit we thoroughly discussed current symptoms, most likely diagnoses, necessary evaluation, and the plan, which includes:  Orders Placed This Encounter   Procedures     Vitamin B1 whole blood     Methylmalonic Acid     Vitamin B6     Vitamin B12     Lyme Disease Jennifer with reflex to WB Serum     Folate     We discussed that in addition to his gabapentin, he could try alpha lipoic acid at 600 mg daily to see if it helps his neuropathy symptoms.  I also advised him to cut his drinking to 1 alcohol drink per day or less.    I recommended to avoid pressure and tight fitting shoes to see if his right foot pain improves.    Additional recommendations after the work-up.    Next follow-up appointment is in the next 3 months or earlier if needed.    Total Time: 60 minutes spent on the date of the encounter doing chart review, history and exam, documentation and further activities per the note.    Matt Stevens MD  [facility]  (Chart documentation was completed in part with Dragon voice-recognition software. Even though reviewed, some grammatical, spelling, and word errors may remain.)

## 2021-04-08 ENCOUNTER — OFFICE VISIT (OUTPATIENT)
Dept: NEUROLOGY | Facility: CLINIC | Age: 81
End: 2021-04-08
Attending: PODIATRIST
Payer: COMMERCIAL

## 2021-04-08 VITALS
OXYGEN SATURATION: 97 % | SYSTOLIC BLOOD PRESSURE: 99 MMHG | HEART RATE: 55 BPM | DIASTOLIC BLOOD PRESSURE: 62 MMHG | WEIGHT: 212 LBS | BODY MASS INDEX: 28.71 KG/M2 | TEMPERATURE: 97.6 F | HEIGHT: 72 IN

## 2021-04-08 DIAGNOSIS — E67.2 HYPERVITAMINOSIS B6: ICD-10-CM

## 2021-04-08 DIAGNOSIS — G60.3 IDIOPATHIC PROGRESSIVE NEUROPATHY: ICD-10-CM

## 2021-04-08 DIAGNOSIS — M79.671 RIGHT FOOT PAIN: Primary | ICD-10-CM

## 2021-04-08 PROCEDURE — 99205 OFFICE O/P NEW HI 60 MIN: CPT | Performed by: PSYCHIATRY & NEUROLOGY

## 2021-04-08 PROCEDURE — G0463 HOSPITAL OUTPT CLINIC VISIT: HCPCS

## 2021-04-08 ASSESSMENT — MIFFLIN-ST. JEOR: SCORE: 1705.66

## 2021-04-08 NOTE — PATIENT INSTRUCTIONS
AFTER VISIT SUMMARY (AVS):    At today's visit we thoroughly discussed current symptoms, most likely diagnoses, necessary evaluation, and the plan, which includes:  Orders Placed This Encounter   Procedures     Vitamin B1 whole blood     Methylmalonic Acid     Vitamin B6     Vitamin B12     Lyme Disease Jennifer with reflex to WB Serum     Folate     We discussed that in addition to your gabapentin, you could try alpha lipoic acid at 600 mg daily to see if it helps your neuropathy symptoms.  Please also cut your drinking to 1 alcohol drink per day or less.    Please avoid pressure and tight fitting shoes to see if your right foot pain improves.    Additional recommendations after the work-up.    Next follow-up appointment is in the next 3 months or earlier if needed.    Please do not hesitate to call me with any questions or concerns.    Thanks.

## 2021-04-08 NOTE — LETTER
"    4/8/2021         RE: Malcolm Barker  [address] [place] [address]        Dear Colleague,    Thank you for referring your patient, Malcolm Barker, to the [facility] [place]. Please see a copy of my visit note below.    INITIAL NEUROLOGY CONSULTATION    DATE OF VISIT: 4/8/2021  CLINIC LOCATION: [facility]  MRN: [id number]  PATIENT NAME: Malcolm Barker  YOB: 1940    PRIMARY CARE PROVIDER: Tod Callahan MD     REASON FOR VISIT:   Chief Complaint   Patient presents with     NEUROPATHY     HISTORY OF PRESENT ILLNESS:                                                    Mr. Malcolm Barker is 81 year old right handed male patient with past medical history of hypertension, congestive heart failure, chronic atrial fibrillation (on warfarin), and idiopathic polyneuropathy, who was seen in consultation today requested by Dr. Sanchez, D.P.M., for right foot pain/polyneuropathy.    Per patient's report and chart review, he was recently seen for constant pain on the top of right foot for the last 3 months that feels like a \"deep bruise\"and is up to 6/10 in intensity.  It is exacerbated by periods of prolonged sitting (4 to 5 hours while watching TV) in his kitchen.  There are no other associated symptoms, aggravating or alleviating factors.    For this problem the patient was seen at his primary care provider office and referred to Dr. Sanchez (podiatry) for suspicion of metatarsalgia.  It felt that it could be related to higher arch foot and midfoot joints, as well as ligamentous strain, but Dr. Sanchez referred him to neurology to evaluate for neurological causes.    The patient also has history of peripheral polyneuropathy for the last 15+ years.  It is gradually progressing.  He reports constant numbness and tingling along with intermittent sharp pains in both feet.  Lately, he also noticed that his fingertips have reduced " sensation.  Walking and playing pickleball make his symptoms worse.  He did not notice any additional aggravating or alleviating factors.  Every day he has 2 alcoholic drinks and on occasion more than that.  He denies prior history of chemotherapy or diabetes.  He is on 600 mg of gabapentin 3 times daily, which helps.  No side effects.    The patient was previously followed by Dr. Willett at the [facility] neurology clinic.  His EMG from January 2012 demonstrated sensory polyneuropathy along with absent right peroneal compound muscle action potential.  It was noted that the patient has past history of significant injury to the right ankle with associated severe swelling, and caution was indicated in attributing the absent peroneal motor response to the patient's polyneuropathy.    Recent laboratory evaluation from March 2021 demonstrated low hemoglobin (10.7), elevated MCV (102), and INR ranging from 1.9-2.7 (on warfarin).  Historically, his B12 levels were in normal range in 0114-2088.    X-ray of the right foot from 3/17/2021 demonstrated no fracture or osseous lesions, normal joint spacing, and small plantar calcaneal spur.  Images were personally reviewed and independently interpreted.  Cervical spine CT at that time demonstrated multilevel degenerative changes.    No additional useful information is available in Care Everywhere, which was reviewed.    Review of Systems - the patient endorses glaucoma (currently followed in ophthalmology clinic). Otherwise, he denies any other complaints on 14-point comprehensive review of systems.  PAST MEDICAL/SURGICAL HISTORY:                                                    I personally reviewed patient's past medical and surgical history with the patient at today's visit.  MEDICATIONS:                                                    I personally reviewed patient's medications and allergies with the patient at today's visit.  ALLERGIES:                          "                             Allergies   Allergen Reactions     Carvedilol Other (See Comments)     fatigue     Dorzolamide Other (See Comments)     Other reaction(s): Unknown/Not Verified  Red irritated eye  Red irritated eye       Metoprolol Other (See Comments)     Other reaction(s): Hypotension     FAMILY/SOCIAL HISTORY:                                                    Family and social history was reviewed with the patient at today's visit.  No family history of neurological disorders.  Former smoker, quit in 1999.  2 alcohol drinks daily.  Denies recreational drug use.  .  Retired.  REVIEW OF SYSTEMS:                                                    Patient has completed a [facility] Patient Health History, including a 14-system review, which was personally reviewed, and pertinent positives are listed in HPI. He denies any additional problems on the further questioning.  EXAM:                                                    VITAL SIGNS:   BP 99/62 (BP Location: Left arm, Patient Position: Sitting, Cuff Size: Adult Large)   Pulse 55   Temp 97.6  F (36.4  C) (Oral)   Ht 1.822 m (5' 11.75\")   Wt 96.2 kg (212 lb)   SpO2 97%   BMI 28.95 kg/m      General: pt is in NAD, cooperative.  Skin: normal turgor, moist mucous membranes, no lesions/rashes noticed.  HEENT: ATNC, EOMI, PERRL, white sclera, normal conjunctiva, no nystagmus or ptosis. No carotid bruits bilaterally.  Respiratory: lung sounds clear to auscultation bilaterally, no crackles, wheezes, rhonchi. Symmetric lung excursion, no accessory respiratory muscle use.  Cardiovascular: normal S1/S2, no murmurs/rubs/gallops.   Abdomen: Not distended.  : deferred.    Neurological:  Mental: alert, follows commands, no aphasia or dysarthria. Fund of knowledge is appropriate for age.  Cranial Nerves:  CN II: visual acuity - able to accurately count fingers with each eye. Visual fields intact, fundi: discs sharp, no papilledema and normal " vessels bilaterally.  CN III, IV, VI: EOM intact, pupils equal and reactive  CN V: facial sensation nl  CN VII: face symmetric, no facial droop  CN VIII: hearing normal  CN IX: palate elevation symmetric, uvula at midline  CN XI SCM normal, shoulder shrug nl  CN XII: tongue midline  Motor: Strength: 5/5 in all major groups of all extremities. Normal tone. No abnormal movements. No pronator drift b/l.  Reflexes: Triceps, biceps, brachioradialis, and patellar reflexes are normal and symmetric, achilles reflexes are absent bilaterally. No clonus noted. Toes are down-going b/l.   Sensory:  light touch, pinprick, and vibration are reduced in both lower extremities 1 cm below his knees down including both feet. Romberg: negative.  Coordination: FNF and heel-shin tests intact b/l.   Gait:  Normal, but has difficulty with tandem walk.  DATA:   LABS/EMG/IMAGING/OTHER STUDIES: I reviewed pertinent medical records, including tabulated data from EMG report (with independent interpretation), prior neurology notes and Care Everywhere, as detailed in the history of present illness.  ASSESSMENT AND PLAN:      ASSESSMENT: Malcolm Barker is a 81 year old male patient with listed above past medical history, who presents with constant pain on the top of the right foot for the last several months exacerbated by prolonged sitting (4 to 5 hours) in context of gradually worsening chronic polyneuropathy.    We had a detailed discussion with the patient regarding his presenting complaints.  The neurological exam today is supportive of known diagnosis of peripheral polyneuropathy.    We reviewed common causes of neuropathy, that include diabetes, vitamin deficiencies, thyroid dysfunction, chemotherapy, and excessive alcohol use.  I counseled the patient to reduce his alcohol intake to 1 drink per day or less.  I would also like to check several labs for treatable causes of neuropathy, especially in light of recent elevated MCV and  heavy alcohol use.  We discussed available treatment options for polyneuropathy, including typical range of doses for gabapentin, alternative treatments, and alpha lipoic acid.      In addition, we went over possible etiologies of his right foot pain, which certainly could be musculoskeletal, but there might be a contribution from compression of the right medial dorsal cutaneous nerve due to mechanical pressure.  I advised the patient to avoid tight fitting shoes and prolonged pressure on the dorsum of the right foot to see if it helps his symptoms.  The rest of our discussion and the plan are summarized below.    DIAGNOSES:    ICD-10-CM    1. Right foot pain  M79.671    2. Idiopathic progressive neuropathy  G60.3      PLAN: At today's visit we thoroughly discussed current symptoms, most likely diagnoses, necessary evaluation, and the plan, which includes:  Orders Placed This Encounter   Procedures     Vitamin B1 whole blood     Methylmalonic Acid     Vitamin B6     Vitamin B12     Lyme Disease Jennifer with reflex to WB Serum     Folate     We discussed that in addition to his gabapentin, he could try alpha lipoic acid at 600 mg daily to see if it helps his neuropathy symptoms.  I also advised him to cut his drinking to 1 alcohol drink per day or less.    I recommended to avoid pressure and tight fitting shoes to see if his right foot pain improves.    Additional recommendations after the work-up.    Next follow-up appointment is in the next 3 months or earlier if needed.    Total Time: 60 minutes spent on the date of the encounter doing chart review, history and exam, documentation and further activities per the note.    Matt Stevens MD  [facility]  (Chart documentation was completed in part with Dragon voice-recognition software. Even though reviewed, some grammatical, spelling, and word errors may remain.)              Again, thank you for allowing me to participate in the care of your  patient.        Sincerely,        Matt Stevens MD

## 2021-04-13 DIAGNOSIS — D64.9 ANEMIA, UNSPECIFIED TYPE: ICD-10-CM

## 2021-04-13 DIAGNOSIS — G60.3 IDIOPATHIC PROGRESSIVE NEUROPATHY: ICD-10-CM

## 2021-04-13 LAB
ERYTHROCYTE [DISTWIDTH] IN BLOOD BY AUTOMATED COUNT: 12.6 % (ref 10–15)
FERRITIN SERPL-MCNC: 227 NG/ML (ref 26–388)
FOLATE SERPL-MCNC: 54.1 NG/ML
HCT VFR BLD AUTO: 41.9 % (ref 40–53)
HGB BLD-MCNC: 13.9 G/DL (ref 13.3–17.7)
IRON SERPL-MCNC: 102 UG/DL (ref 35–180)
MCH RBC QN AUTO: 33 PG (ref 26.5–33)
MCHC RBC AUTO-ENTMCNC: 33.2 G/DL (ref 31.5–36.5)
MCV RBC AUTO: 100 FL (ref 78–100)
PLATELET # BLD AUTO: 191 10E9/L (ref 150–450)
RBC # BLD AUTO: 4.21 10E12/L (ref 4.4–5.9)
VIT B12 SERPL-MCNC: 517 PG/ML (ref 193–986)
WBC # BLD AUTO: 5.8 10E9/L (ref 4–11)

## 2021-04-13 PROCEDURE — 84425 ASSAY OF VITAMIN B-1: CPT | Mod: 90 | Performed by: PSYCHIATRY & NEUROLOGY

## 2021-04-13 PROCEDURE — 99000 SPECIMEN HANDLING OFFICE-LAB: CPT | Performed by: PSYCHIATRY & NEUROLOGY

## 2021-04-13 PROCEDURE — 83540 ASSAY OF IRON: CPT | Performed by: PSYCHIATRY & NEUROLOGY

## 2021-04-13 PROCEDURE — 82607 VITAMIN B-12: CPT | Performed by: PSYCHIATRY & NEUROLOGY

## 2021-04-13 PROCEDURE — 84207 ASSAY OF VITAMIN B-6: CPT | Mod: 90 | Performed by: PSYCHIATRY & NEUROLOGY

## 2021-04-13 PROCEDURE — 82746 ASSAY OF FOLIC ACID SERUM: CPT | Performed by: PSYCHIATRY & NEUROLOGY

## 2021-04-13 PROCEDURE — 86618 LYME DISEASE ANTIBODY: CPT | Performed by: PSYCHIATRY & NEUROLOGY

## 2021-04-13 PROCEDURE — 85027 COMPLETE CBC AUTOMATED: CPT | Performed by: PSYCHIATRY & NEUROLOGY

## 2021-04-13 PROCEDURE — 82728 ASSAY OF FERRITIN: CPT | Performed by: PSYCHIATRY & NEUROLOGY

## 2021-04-13 PROCEDURE — 83921 ORGANIC ACID SINGLE QUANT: CPT | Performed by: PSYCHIATRY & NEUROLOGY

## 2021-04-13 PROCEDURE — 36415 COLL VENOUS BLD VENIPUNCTURE: CPT | Performed by: PSYCHIATRY & NEUROLOGY

## 2021-04-14 LAB — B BURGDOR IGG+IGM SER QL: 0.05 (ref 0–0.89)

## 2021-04-15 LAB — METHYLMALONATE SERPL-SCNC: 0.28 UMOL/L (ref 0–0.4)

## 2021-04-16 LAB — VIT B6 SERPL-MCNC: 220.2 NMOL/L (ref 20–125)

## 2021-04-17 LAB — VIT B1 BLD-MCNC: 74 NMOL/L (ref 70–180)

## 2021-05-06 ENCOUNTER — TELEPHONE (OUTPATIENT)
Dept: PEDIATRICS | Facility: CLINIC | Age: 81
End: 2021-05-06

## 2021-05-06 NOTE — TELEPHONE ENCOUNTER
Patient left a  asking for a call back regarding INR and Dosing as he is out of town,  Please call when able.  Thank you.  Mariia Marcos, RN  Anticoagulation Nurse - [facility], [place]

## 2021-05-06 NOTE — LETTER
[address] [address]  [phone]       May 6, 2021    To: [facility]  [address] [place] [address]  Fax: [phone]          Orders for Malcolm Barker     : 1940     He is being treated with warfarin for atrial fibrillation and ischemic cardiomyopathy and requires his INR checked as needed.       Draw INR every 2 to 6 weeks starting 2021 through 2021.     Fingerstick INR is acceptable.       Diag: Z79.01  Long-term (current) use of anticoagulants,   I48.2 Chronic atrial fibrillation (H),   I25.5 Ischemic cardiomyopathy            Fax result to:      [facility] (Fax: [phone]).  Phone number: [phone]     Please also give result to patient.        Thanks,                 Tod Callahan MD  UPIN# [id number]  [address] [address]  [phone]

## 2021-05-06 NOTE — TELEPHONE ENCOUNTER
Spoke to patient by phone.   He is going to be unable to come to his INR appointment on 5/11/21.  He will be in [place].  Requested an order letter be faxed to the lab in [place] as was last year.  Order letter done.  Awaiting Dr. Callahan's signature before faxing.    Vianca Kent RN

## 2021-05-07 NOTE — TELEPHONE ENCOUNTER
Order letter faxed to [facility] in [place], [place]. [phone].  Patient notified.  Copy was also e-mailed to patient.    Vianca Kent RN

## 2021-05-12 LAB — INR PPP: 3.4 (ref 0.9–1.1)

## 2021-05-19 ENCOUNTER — TELEPHONE (OUTPATIENT)
Dept: PEDIATRICS | Facility: CLINIC | Age: 81
End: 2021-05-19
Payer: COMMERCIAL

## 2021-05-19 DIAGNOSIS — Z79.01 LONG TERM CURRENT USE OF ANTICOAGULANT THERAPY: ICD-10-CM

## 2021-05-19 DIAGNOSIS — I48.20 CHRONIC ATRIAL FIBRILLATION (H): ICD-10-CM

## 2021-05-19 PROCEDURE — 99207 PR NO CHARGE NURSE ONLY: CPT | Performed by: INTERNAL MEDICINE

## 2021-05-19 NOTE — TELEPHONE ENCOUNTER
Pt called reporting his INR on 5/11/21 was 3.4 or 3.5 when he was in the hospital in [place]    Pt takes 5mg q day except Saturday 2.5mg    Routing to INR RN    Thank you  Neville Acevedo RN on 5/19/2021 at 1:59 PM

## 2021-05-19 NOTE — TELEPHONE ENCOUNTER
ANTICOAGULATION FOLLOW-UP CLINIC VISIT    [facility]  Date:  5/19/2021  Contact Type:  Telephone/ discussed with patient    SUBJECTIVE:  Patient Findings     Comments:  Patient had INR drawn at \[facility]\"" about 1 week ago.  Results were never received.  Requested that patient notify the lab when he goes there next time that results were not received by his provider to ensure that they are sending the results.  Also contacted the lab to verify that they had the correct information.  They stated that they need an additional form completed so that our fax number can be added to their system.  Also encouraged patient to reach out to us if he does not hear back within 24-48 hours of having his INR checked to ensure that we receive the results.      Patient states that INR was 3.2-3.5 (couldn't remember exact number).      The patient was assessed for diet, medication, and activity level changes, missed or extra doses, bruising or bleeding, with no problem findings.  Patient denies any identifiable changes that caused the supra therapeutic INR. Recommended taking a half dose today then rechecking INR in 1 week (2 weeks from last check).             Clinical Outcomes     Negatives:  Major bleeding event, Thromboembolic event, Anticoagulation-related hospital admission, Anticoagulation-related ED visit, Anticoagulation-related fatality    Comments:  Patient had INR drawn at \[facility]\"" about 1 week ago.  Results were never received.  Requested that patient notify the lab when he goes there next time that results were not received by his provider to ensure that they are sending the results.  Also contacted the lab to verify that they had the correct information.  They stated that they need an additional form completed so that our fax number can be added to their system.  Also encouraged patient to reach out to us if he does not hear back within 24-48 hours of having his INR checked to  ensure that we receive the results.      Patient states that INR was 3.2-3.5 (couldn't remember exact number).      The patient was assessed for diet, medication, and activity level changes, missed or extra doses, bruising or bleeding, with no problem findings.  Patient denies any identifiable changes that caused the supra therapeutic INR. Recommended taking a half dose today then rechecking INR in 1 week (2 weeks from last check).                OBJECTIVE    No results for input(s): INR, [id number], [id number], 2AGN, F2 in the last 168 hours.    ASSESSMENT / PLAN      Anticoagulation Summary  As of 5/19/2021    INR goal:  2.0-3.0   TTR:  90.3 % (11.9 mo)   INR used for dosing:  3.4 (5/12/2021)   Warfarin maintenance plan:  2.5 mg (5 mg x 0.5) every Sat; 5 mg (5 mg x 1) all other days   Full warfarin instructions:  5/19: 2.5 mg; Otherwise 2.5 mg every Sat; 5 mg all other days   Weekly warfarin total:  32.5 mg   Plan last modified:  Vianca Kent RN (3/2/2021)   Next INR check:  5/26/2021   Priority:  Maintenance   Target end date:  Indefinite    Indications    Long term current use of anticoagulant therapy [Z79.01]  Chronic atrial fibrillation (H) [I48.20]             Anticoagulation Episode Summary     INR check location:      Preferred lab:      Send INR reminders to:  JOHNNIE LACEY    Comments:  5mg tabs // APPT CARD // cell phone [phone]      Anticoagulation Care Providers     Provider Role Specialty Phone number    Leisa Mistry MD Referring Internal Medicine - Pediatrics [phone]    Tod Callahan MD  Internal Medicine - Pediatrics [phone]            See the Encounter Report to view Anticoagulation Flowsheet and Dosing Calendar (Go to Encounters tab in chart review, and find the Anticoagulation Therapy Visit)    Dosage adjustment made based on physician directed care plan.    Mandi Phoenix RN

## 2021-05-26 ENCOUNTER — DOCUMENTATION ONLY (OUTPATIENT)
Dept: PEDIATRICS | Facility: CLINIC | Age: 81
End: 2021-05-26

## 2021-05-26 ENCOUNTER — OFFICE VISIT (OUTPATIENT)
Dept: PEDIATRICS | Facility: CLINIC | Age: 81
End: 2021-05-26
Payer: COMMERCIAL

## 2021-05-26 ENCOUNTER — ANTICOAGULATION THERAPY VISIT (OUTPATIENT)
Dept: ANTICOAGULATION | Facility: CLINIC | Age: 81
End: 2021-05-26

## 2021-05-26 VITALS
DIASTOLIC BLOOD PRESSURE: 62 MMHG | HEART RATE: 78 BPM | SYSTOLIC BLOOD PRESSURE: 98 MMHG | WEIGHT: 207 LBS | RESPIRATION RATE: 16 BRPM | BODY MASS INDEX: 28.27 KG/M2 | OXYGEN SATURATION: 98 % | TEMPERATURE: 96.8 F

## 2021-05-26 DIAGNOSIS — Z79.01 LONG TERM CURRENT USE OF ANTICOAGULANT THERAPY: ICD-10-CM

## 2021-05-26 DIAGNOSIS — R19.5 LOOSE STOOLS: Primary | ICD-10-CM

## 2021-05-26 DIAGNOSIS — I50.22 CHRONIC SYSTOLIC CONGESTIVE HEART FAILURE (H): ICD-10-CM

## 2021-05-26 DIAGNOSIS — I48.20 CHRONIC ATRIAL FIBRILLATION (H): Primary | ICD-10-CM

## 2021-05-26 DIAGNOSIS — R19.5 LOOSE STOOLS: ICD-10-CM

## 2021-05-26 DIAGNOSIS — I48.20 CHRONIC ATRIAL FIBRILLATION (H): ICD-10-CM

## 2021-05-26 DIAGNOSIS — N18.31 STAGE 3A CHRONIC KIDNEY DISEASE (H): ICD-10-CM

## 2021-05-26 LAB
CAPILLARY BLOOD COLLECTION: NORMAL
INR PPP: 2.5 (ref 0.86–1.14)

## 2021-05-26 PROCEDURE — 36416 COLLJ CAPILLARY BLOOD SPEC: CPT | Performed by: INTERNAL MEDICINE

## 2021-05-26 PROCEDURE — 99213 OFFICE O/P EST LOW 20 MIN: CPT | Performed by: INTERNAL MEDICINE

## 2021-05-26 PROCEDURE — 85610 PROTHROMBIN TIME: CPT | Performed by: INTERNAL MEDICINE

## 2021-05-26 PROCEDURE — 87506 IADNA-DNA/RNA PROBE TQ 6-11: CPT | Performed by: INTERNAL MEDICINE

## 2021-05-26 PROCEDURE — 87493 C DIFF AMPLIFIED PROBE: CPT | Performed by: INTERNAL MEDICINE

## 2021-05-26 PROCEDURE — 99207 PR NO CHARGE NURSE ONLY: CPT

## 2021-05-26 NOTE — PROGRESS NOTES
VM left at 1239 PM returning call.    Elmira Stanley RN  [facility]

## 2021-05-26 NOTE — PROGRESS NOTES
ANTICOAGULATION FOLLOW-UP CLINIC VISIT    [facility]  Date:  2021  Contact Type:  Telephone/ discussed with patient    SUBJECTIVE:  Patient Findings     Positives:  Change in health (loose stools for about 1 month. ), Change in medications (Cipro started about 7 days ago that was from .  Patient feels that this may have helped with is loose stools a little, but still has symptoms.)    Comments:  MD appointment today.  Patient will be traveling to [place] for the summer and having INRs checked at [facility].  Orders were sent previously.  The patient was assessed for diet, and activity level changes, missed or extra doses, bruising or bleeding, with no problem findings.            Clinical Outcomes     Negatives:  Major bleeding event, Thromboembolic event, Anticoagulation-related hospital admission, Anticoagulation-related ED visit, Anticoagulation-related fatality    Comments:  MD appointment today.  Patient will be traveling to [place] for the summer and having INRs checked at [facility].  Orders were sent previously.  The patient was assessed for diet, and activity level changes, missed or extra doses, bruising or bleeding, with no problem findings.               OBJECTIVE    Recent labs: (last 7 days)     21  1032   INR 2.50*       ASSESSMENT / PLAN  INR assessment THER    Recheck INR In: 6 WEEKS    INR Location Clinic      Anticoagulation Summary  As of 2021    INR goal:  2.0-3.0   TTR:  88.7 % (1 y)   INR used for dosin.50 (2021)   Warfarin maintenance plan:  2.5 mg (5 mg x 0.5) every Sat; 5 mg (5 mg x 1) all other days   Full warfarin instructions:  2.5 mg every Sat; 5 mg all other days   Weekly warfarin total:  32.5 mg   No change documented:  Mandi Phoenix RN   Plan last modified:  Vianca Kent, RN (3/2/2021)   Next INR check:  2021   Priority:  Maintenance   Target end date:  Indefinite    Indications    Long term current use of  anticoagulant therapy [Z79.01]  Chronic atrial fibrillation (H) [I48.20]             Anticoagulation Episode Summary     INR check location:      Preferred lab:      Send INR reminders to:  JOHNNIE LACEY    Comments:  5mg tabs // APPT CARD // cell phone [phone]      Anticoagulation Care Providers     Provider Role Specialty Phone number    Leisa Mistry MD Referring Internal Medicine - Pediatrics [phone]    Tod Callahan MD  Internal Medicine - Pediatrics [phone]            See the Encounter Report to view Anticoagulation Flowsheet and Dosing Calendar (Go to Encounters tab in chart review, and find the Anticoagulation Therapy Visit)    Dosage adjustment made based on physician directed care plan.    Mandi Phoenix RN

## 2021-05-26 NOTE — PROGRESS NOTES
Assessment & Plan       ICD-10-CM    1. Loose stools  R19.5 Enteric Bacteria and Virus Panel by [facility] Stool     Clostridium difficile Toxin B PCR     Capillary Blood Collection   2. Chronic atrial fibrillation (H)  I48.20 INR   3. Chronic systolic congestive heart failure (H)  I50.22    4. Stage 3a chronic kidney disease  N18.31      Loose stools. Not technically diarrhea. Since is a deviation from his norm, recommend starting w/ infectious evaluation.   Increase fiber.  If sx do not improve and no infectious cause found, may warrant GI consultation.     CHF managed by cardiology outside of our system.     Return in about 2 weeks (around 6/9/2021), or if symptoms worsen or fail to improve.    Tod Callahan MD  [facility] DEEPTHI Jessica is a 81 year old who presents for the following health issues     HPI     Diarrhea  Onset/Duration: 30 days  Description:       Consistency of stool: loose and pasty       Blood in stool: no but pt states darker in color       Number of loose stools past 24 hours: 1  Progression of Symptoms: same and intermittent  Accompanying signs and symptoms:       Fever: no       Nausea/Vomiting: no       Abdominal pain: no       Weight loss: no       Episodes of constipation: no  History   Ill contacts: no  Recent use of antibiotics: YES cipro from 2007 started 7 days ago  Recent travels: no  Recent medication-new or changes(Rx or OTC): no  Precipitating or alleviating factors: None  Therapies tried and outcome: Imodium AD and Ciprofloxacin    2 days ago had 2 loose BM's in the am  None yesterday  Once this am, loose    Sx ongoing for the past month    No cramping, no fever, change in appetite.  Does feel more tired than typical    No recent travel  [place], sx began prior to well water drinking    Wife w/o any GI sx    Lab Results   Component Value Date    INR 3.4 05/12/2021    INR 2.70 03/30/2021           Objective    BP 98/62   Pulse  78   Temp 96.8  F (36  C) (Tympanic)   Resp 16   Wt 93.9 kg (207 lb)   SpO2 98%   BMI 28.27 kg/m    Body mass index is 28.27 kg/m .  Physical Exam   GEN: No distress  SKIN: No rashes  NECK: Supple. No LAD or TM.  LUNGS: Clear to auscultation bilaterally. No rhonchi, rales, wheezes or retractions.   CV: Regular rate and rhythm.  No murmurs, rubs or gallops. Pulses 2+ radial.  ABD: Bowel sounds positive throughout. Soft, nontender, nondistended. No organomegaly. No masses.  EXTR: no edema

## 2021-05-26 NOTE — PROGRESS NOTES
ANTICOAGULATION MANAGEMENT      Malcolm HENDERSON [facility] due for annual renewal of referral to anticoagulation monitoring. Order pended for your review and signature.      ANTICOAGULATION SUMMARY      Warfarin indication(s)     Atrial fibrillation    Heart valve present?  NO       Current goal range   INR: 2.0-3.0     Goal appropriate for indication? Yes, INR 2-3 appropriate for hx of DVT, PE, hypercoagulable state, Afib, LVAD, or bileaflet AVR without risk factors     Current duration of therapy Indefinite/long term therapy   Time in Therapeutic Range (TTR)  (Goal > 60%) 88.7 %       Office visit with referring provider's group within last year yes on today       Mandi Phoenix RN

## 2021-05-27 LAB
C COLI+JEJUNI+LARI FUSA STL QL NAA+PROBE: NOT DETECTED
C DIFF TOX B STL QL: NEGATIVE
EC STX1 GENE STL QL NAA+PROBE: NOT DETECTED
EC STX2 GENE STL QL NAA+PROBE: NOT DETECTED
ENTERIC PATHOGEN COMMENT: NORMAL
NOROV GI+II ORF1-ORF2 JNC STL QL NAA+PR: NOT DETECTED
RVA NSP5 STL QL NAA+PROBE: NOT DETECTED
SALMONELLA SP RPOD STL QL NAA+PROBE: NOT DETECTED
SHIGELLA SP+EIEC IPAH STL QL NAA+PROBE: NOT DETECTED
SPECIMEN SOURCE: NORMAL
V CHOL+PARA RFBL+TRKH+TNAA STL QL NAA+PR: NOT DETECTED
Y ENTERO RECN STL QL NAA+PROBE: NOT DETECTED

## 2021-05-28 ENCOUNTER — MYC MEDICAL ADVICE (OUTPATIENT)
Dept: PEDIATRICS | Facility: CLINIC | Age: 81
End: 2021-05-28

## 2021-05-28 DIAGNOSIS — R19.5 LOOSE STOOLS: Primary | ICD-10-CM

## 2021-06-24 ENCOUNTER — ANTICOAGULATION THERAPY VISIT (OUTPATIENT)
Dept: PEDIATRICS | Facility: CLINIC | Age: 81
End: 2021-06-24

## 2021-06-24 ENCOUNTER — TRANSFERRED RECORDS (OUTPATIENT)
Dept: HEALTH INFORMATION MANAGEMENT | Facility: CLINIC | Age: 81
End: 2021-06-24

## 2021-06-24 DIAGNOSIS — I48.20 CHRONIC ATRIAL FIBRILLATION (H): ICD-10-CM

## 2021-06-24 DIAGNOSIS — Z79.01 LONG TERM CURRENT USE OF ANTICOAGULANT THERAPY: ICD-10-CM

## 2021-06-24 LAB — INR PPP: 3.1 (ref 0.9–1.1)

## 2021-06-24 NOTE — PROGRESS NOTES
Incoming fax from [facility] [place]    Date of INR 6/24    INR result 3.1

## 2021-06-24 NOTE — PROGRESS NOTES
ANTICOAGULATION MANAGEMENT     Malcolm HENDERSON [facility] 81 year old male is on warfarin with supratherapeutic INR result. (Goal INR 2.0-3.0)    Recent labs: (last 7 days)     06/24/21   INR 3.1*       ASSESSMENT     Source(s): Chart Review and Patient/Caregiver Call. Patient called [facility] directly with result.       Warfarin doses taken: Warfarin taken as instructed    Diet: Per Neurologist, he is to reduce his alcohol intake to 1 drink per day or less. He has not done this. Has at least 2 drinks per day. Says he will try and cut back to 1.5 drinks per day.    New illness, injury, or hospitalization: No    Medication/supplement changes: alpha lipoic acid (OTC) started on this week.  No interaction anticipated.    Signs or symptoms of bleeding or clotting: No    Previous INR: Therapeutic last visit; previously outside of goal range    Additional findings: None     PLAN     Recommended plan for no diet, medication or health factor changes affecting INR     Dosing Instructions: Continue your current warfarin dose with next INR in 2 weeks       Summary  As of 6/24/2021    Full warfarin instructions:  2.5 mg every Sat; 5 mg all other days   Next INR check:  7/8/2021             Telephone call with Malcolm whom verbalizes understanding and agrees to plan    Patient using outside facility for labs    Education provided: Importance of consistent vitamin K intake, Impact of vitamin K foods on INR, Potential interaction between warfarin and alcohol, Target INR goal and significance of current INR result and No interaction anticipated between warfarin and alpha lipoic acid.    Plan made per [facility] anticoagulation protocol    Vianca Kent RN  Anticoagulation Clinic  6/24/2021    _______________________________________________________________________     Anticoagulation Episode Summary     Current INR goal:  2.0-3.0   TTR:  87.4 % (1 y)   Target end date:  Indefinite   Send INR reminders to:  JOHNNIE LACEY    Indications     Long term current use of anticoagulant therapy [Z79.01]  Chronic atrial fibrillation (H) [I48.20]           Comments:  5mg tabs // APPT CARD // cell phone [phone]         Anticoagulation Care Providers     Provider Role Specialty Phone number    Tod Callahan MD Referring Internal Medicine - Pediatrics [phone]    Leisa Mistry MD Referring Internal Medicine - Pediatrics [phone]

## 2021-07-08 ENCOUNTER — ANTICOAGULATION THERAPY VISIT (OUTPATIENT)
Dept: PEDIATRICS | Facility: CLINIC | Age: 81
End: 2021-07-08

## 2021-07-08 ENCOUNTER — TRANSFERRED RECORDS (OUTPATIENT)
Dept: HEALTH INFORMATION MANAGEMENT | Facility: CLINIC | Age: 81
End: 2021-07-08

## 2021-07-08 DIAGNOSIS — I48.20 CHRONIC ATRIAL FIBRILLATION (H): ICD-10-CM

## 2021-07-08 DIAGNOSIS — Z79.01 LONG TERM CURRENT USE OF ANTICOAGULANT THERAPY: ICD-10-CM

## 2021-07-08 LAB — INR PPP: 3.3 (ref 0.9–1.1)

## 2021-07-08 NOTE — PROGRESS NOTES
ANTICOAGULATION MANAGEMENT     Malcolm HENDERSON [facility] 81 year old male is on warfarin with supratherapeutic INR result. (Goal INR 2.0-3.0)    Recent labs: (last 7 days)     07/08/21   INR 3.3*       ASSESSMENT     Source(s): Patient/Caregiver Call       Warfarin doses taken: Warfarin taken differently, but did not change total weekly dose    Diet: No new diet changes identified    New illness, injury, or hospitalization: No    Medication/supplement changes: None noted    Signs or symptoms of bleeding or clotting: No    Previous INR: Supratherapeutic    Additional findings: Diarrhea on and off since may, has seen PCP about this and will see gastroentologist     PLAN     Recommended plan for ongoing change(s) affecting INR     Dosing Instructions:  Decrease your warfarin dose (7.7% change) with next INR in 2 weeks       Summary  As of 7/8/2021    Full warfarin instructions:  2.5 mg every Sat; 5 mg all other days   Next INR check:               Telephone call with Malcolm who verbalizes understanding and agrees to plan    Patient using outside facility for labs    Education provided: None required    Plan made per [facility] anticoagulation protocol    Shirley Newton RN  Anticoagulation Clinic  7/8/2021    _______________________________________________________________________     Anticoagulation Episode Summary     Current INR goal:  2.0-3.0   TTR:  83.6 % (1 y)   Target end date:  Indefinite   Send INR reminders to:  JOHNNIE LACEY    Indications    Long term current use of anticoagulant therapy [Z79.01]  Chronic atrial fibrillation (H) [I48.20]           Comments:  5mg tabs // APPT CARD // cell phone [phone]         Anticoagulation Care Providers     Provider Role Specialty Phone number    Tod Callahan MD Referring Internal Medicine - Pediatrics [phone]    Leisa Mistry MD Referring Internal Medicine - Pediatrics [phone]

## 2021-07-08 NOTE — PROGRESS NOTES
Pt called today at 9:02 am to notify us of his INR of 3.3 done in [place]. Please call him at [phone] when able.

## 2021-07-12 DIAGNOSIS — Z79.01 LONG TERM CURRENT USE OF ANTICOAGULANT THERAPY: ICD-10-CM

## 2021-07-12 DIAGNOSIS — I48.20 CHRONIC ATRIAL FIBRILLATION (H): Primary | ICD-10-CM

## 2021-07-13 RX ORDER — WARFARIN SODIUM 5 MG/1
TABLET ORAL
Qty: 90 TABLET | Refills: 1 | Status: SHIPPED | OUTPATIENT
Start: 2021-07-13 | End: 2022-02-01

## 2021-07-13 NOTE — TELEPHONE ENCOUNTER
Prescription approved per [facility] Refill Protocol.  Deb Reddy RN, BSN  Anticoagulation Clinic

## 2021-07-19 ENCOUNTER — TRANSFERRED RECORDS (OUTPATIENT)
Dept: HEALTH INFORMATION MANAGEMENT | Facility: CLINIC | Age: 81
End: 2021-07-19

## 2021-07-22 ENCOUNTER — TRANSFERRED RECORDS (OUTPATIENT)
Dept: HEALTH INFORMATION MANAGEMENT | Facility: CLINIC | Age: 81
End: 2021-07-22

## 2021-07-22 ENCOUNTER — ANTICOAGULATION THERAPY VISIT (OUTPATIENT)
Dept: PEDIATRICS | Facility: CLINIC | Age: 81
End: 2021-07-22

## 2021-07-22 ENCOUNTER — ANTICOAGULATION THERAPY VISIT (OUTPATIENT)
Dept: ANTICOAGULATION | Facility: CLINIC | Age: 81
End: 2021-07-22

## 2021-07-22 DIAGNOSIS — Z79.01 LONG TERM CURRENT USE OF ANTICOAGULANT THERAPY: Primary | ICD-10-CM

## 2021-07-22 DIAGNOSIS — I48.20 CHRONIC ATRIAL FIBRILLATION (H): ICD-10-CM

## 2021-07-22 LAB
INR (EXTERNAL) - DO NOT USE: 2 (ref 2–3)
INR (EXTERNAL): 2.4 (ref 0.9–1.1)

## 2021-07-22 NOTE — PROGRESS NOTES
ANTICOAGULATION MANAGEMENT     Malcolm HENDERSON [facility] 81 year old male is on warfarin with therapeutic INR result. (Goal INR 2.0-3.0)    Patient reported INR result of 2.4 today from [facility] in [place].    ASSESSMENT     Source(s): Chart Review and Patient/Caregiver Call       Warfarin doses taken: Warfarin taken as instructed    Diet: No new diet changes identified    New illness, injury, or hospitalization: No    Medication/supplement changes: None noted    Signs or symptoms of bleeding or clotting: Yes: Right eye subconjunctival hemorrhage approximately 2 weeks ago. Is mostly resolved.     Previous INR: Supratherapeutic    Additional findings: None     PLAN     Recommended plan for no diet, medication or health factor changes affecting INR     Dosing Instructions: Continue your current warfarin dose with next INR in 3 weeks       Summary  As of 7/22/2021    Full warfarin instructions:  2.5 mg every Tue, Sat; 5 mg all other days   Next INR check:  8/12/2021             Telephone call with  Jaskaran who verbalizes understanding and agrees to plan    He will schedule his next visit at [facility] lab in [place].    Education provided: Potential interaction between warfarin and alcohol and Monitoring for bleeding signs and symptoms    Plan made per [facility] anticoagulation protocol    Vianca Kent RN  Anticoagulation Clinic  7/22/2021    _______________________________________________________________________     Anticoagulation Episode Summary     Current INR goal:  2.0-3.0   TTR:  82.7 % (1 y)   Target end date:  Indefinite   Send INR reminders to:  JOHNNIE LACEY    Indications    Long term current use of anticoagulant therapy [Z79.01]  Chronic atrial fibrillation (H) [I48.20]           Comments:  5mg tabs // APPT CARD // cell phone [phone]         Anticoagulation Care Providers     Provider Role Specialty Phone number    Tod Callahan MD Referring Internal Medicine - Pediatrics [phone]     Leisa Mistry MD Referring Internal Medicine - Pediatrics [phone]

## 2021-07-28 ENCOUNTER — TELEPHONE (OUTPATIENT)
Dept: PEDIATRICS | Facility: CLINIC | Age: 81
End: 2021-07-28

## 2021-07-28 DIAGNOSIS — Z79.01 LONG TERM CURRENT USE OF ANTICOAGULANT THERAPY: Primary | ICD-10-CM

## 2021-07-28 DIAGNOSIS — I48.20 CHRONIC ATRIAL FIBRILLATION (H): ICD-10-CM

## 2021-07-28 NOTE — TELEPHONE ENCOUNTER
Call placed to pt to let him know it is a day early to have his suture removed    According to the discharge instructions they should remain for 5 days  They were placed 7/24/21    Left detailed message on pt's VM    Thank you  Neville Acevedo RN on 7/28/2021 at 12:01 PM

## 2021-07-29 ENCOUNTER — ALLIED HEALTH/NURSE VISIT (OUTPATIENT)
Dept: PEDIATRICS | Facility: CLINIC | Age: 81
End: 2021-07-29
Payer: COMMERCIAL

## 2021-07-29 DIAGNOSIS — Z48.02 ENCOUNTER FOR REMOVAL OF SUTURES: Primary | ICD-10-CM

## 2021-07-29 PROCEDURE — 99207 PR NO CHARGE NURSE ONLY: CPT

## 2021-07-29 NOTE — PROGRESS NOTES
[facility]ures and sutures,staples, steri strips. The patient has had sutures in place for 5 days. There has been no patient reported signs or symptoms of infection or drainage. 3  sutures and sutures,staples, staple, steri strips are seen and located on the right cheek. Tetanus status is up to date. All sutures and sutures,staples, steri strips were easily removed today. Routine wound care discussed by the RN or provider. The patient will follow up as needed.    Thank you  Neville Acevedo RN on 7/29/2021 at 9:12 AM

## 2021-08-11 ENCOUNTER — TELEPHONE (OUTPATIENT)
Dept: PEDIATRICS | Facility: CLINIC | Age: 81
End: 2021-08-11

## 2021-08-11 ENCOUNTER — ANTICOAGULATION THERAPY VISIT (OUTPATIENT)
Dept: PEDIATRICS | Facility: CLINIC | Age: 81
End: 2021-08-11

## 2021-08-11 ENCOUNTER — MYC MEDICAL ADVICE (OUTPATIENT)
Dept: PEDIATRICS | Facility: CLINIC | Age: 81
End: 2021-08-11

## 2021-08-11 ENCOUNTER — TRANSFERRED RECORDS (OUTPATIENT)
Dept: HEALTH INFORMATION MANAGEMENT | Facility: CLINIC | Age: 81
End: 2021-08-11

## 2021-08-11 DIAGNOSIS — Z79.01 LONG TERM CURRENT USE OF ANTICOAGULANT THERAPY: Primary | ICD-10-CM

## 2021-08-11 DIAGNOSIS — G60.3 IDIOPATHIC PROGRESSIVE NEUROPATHY: ICD-10-CM

## 2021-08-11 DIAGNOSIS — I48.20 CHRONIC ATRIAL FIBRILLATION (H): ICD-10-CM

## 2021-08-11 LAB — INR (EXTERNAL): 2.3 (ref 0.8–1.1)

## 2021-08-11 RX ORDER — GABAPENTIN 300 MG/1
600 CAPSULE ORAL 4 TIMES DAILY
Qty: 720 CAPSULE | Refills: 3 | Status: SHIPPED | OUTPATIENT
Start: 2021-08-11 | End: 2022-01-03

## 2021-08-11 NOTE — PROGRESS NOTES
Anticoagulation Management    Unable to reach Jaskaran today.    Today's INR result of 2.3 is therapeutic (goal INR of 2.0-3.0).  Result received from: Outside lab    Follow up required to confirm warfarin dose taken and assess for changes    Left message to continue current dose of warfarin 5 mg tonight.      Anticoagulation clinic to follow up    Mandi Phoenix RN

## 2021-08-11 NOTE — TELEPHONE ENCOUNTER
Reason for Call:  Other call back    Detailed comments: Pt called to report INR results to nurse. Pt received INR result of 2.3 on 8/11/21. Please call pt back to discuss.    Phone Number Patient can be reached at: Cell number on file:    Telephone Information:   Mobile [phone]       Best Time: anytime    Can we leave a detailed message on this number? YES    Call taken on 8/11/2021 at 12:31 PM by Donna Finley

## 2021-08-11 NOTE — TELEPHONE ENCOUNTER
Dr. Callahan-    Please see  request. He increased his dose of gabapentin. Aleja Rodriguez RN on 8/11/2021 at 2:58 PM

## 2021-09-04 ENCOUNTER — HEALTH MAINTENANCE LETTER (OUTPATIENT)
Age: 81
End: 2021-09-04

## 2021-09-14 ENCOUNTER — LAB (OUTPATIENT)
Dept: LAB | Facility: CLINIC | Age: 81
End: 2021-09-14
Payer: COMMERCIAL

## 2021-09-14 ENCOUNTER — ANTICOAGULATION THERAPY VISIT (OUTPATIENT)
Dept: ANTICOAGULATION | Facility: CLINIC | Age: 81
End: 2021-09-14

## 2021-09-14 DIAGNOSIS — Z79.01 LONG TERM CURRENT USE OF ANTICOAGULANT THERAPY: ICD-10-CM

## 2021-09-14 DIAGNOSIS — Z79.01 LONG TERM CURRENT USE OF ANTICOAGULANT THERAPY: Primary | ICD-10-CM

## 2021-09-14 DIAGNOSIS — I48.20 CHRONIC ATRIAL FIBRILLATION (H): ICD-10-CM

## 2021-09-14 LAB — INR BLD: 2.4 (ref 0.9–1.1)

## 2021-09-14 PROCEDURE — 36416 COLLJ CAPILLARY BLOOD SPEC: CPT

## 2021-09-14 PROCEDURE — 85610 PROTHROMBIN TIME: CPT

## 2021-09-14 NOTE — PROGRESS NOTES
ANTICOAGULATION MANAGEMENT     Malcolm HENDERSON [facility] 81 year old male is on warfarin with therapeutic INR result. (Goal INR 2.0-3.0)    Recent labs: (last 7 days)     09/14/21  0854   INR 2.4*       ASSESSMENT     Source(s): Chart Review and Patient/Caregiver Call       Warfarin doses taken: Warfarin taken as instructed    Diet: No new diet changes identified    New illness, injury, or hospitalization: No    Medication/supplement changes: None noted    Signs or symptoms of bleeding or clotting: No    Previous INR: Therapeutic last 2(+) visits    Additional findings: None     PLAN     Recommended plan for no diet, medication or health factor changes affecting INR     Dosing Instructions: Continue your current warfarin dose with next INR in 6 weeks       Summary  As of 9/14/2021    Full warfarin instructions:  2.5 mg every Tue, Sat; 5 mg all other days   Next INR check:  10/26/2021             Telephone call with Malcolm who verbalizes understanding and agrees to plan    Lab visit scheduled    Education provided: Please call back if any changes to your diet, medications or how you've been taking warfarin and Contact [phone]  with any changes, questions or concerns.     Plan made per [facility] anticoagulation protocol    Deb Reddy RN  Anticoagulation Clinic  9/14/2021    _______________________________________________________________________     Anticoagulation Episode Summary     Current INR goal:  2.0-3.0   TTR:  82.7 % (1 y)   Target end date:  Indefinite   Send INR reminders to:  JOHNNIE DEEPTHI    Indications    Long term current use of anticoagulant therapy [Z79.01]  Chronic atrial fibrillation (H) [I48.20]           Comments:  5mg tabs // APPT CARD // cell phone [phone]         Anticoagulation Care Providers     Provider Role Specialty Phone number    Tod Callahan MD Referring Internal Medicine - Pediatrics [phone]    Leisa Mistry MD Referring Internal Medicine - Pediatrics [phone]

## 2021-09-23 ENCOUNTER — ANTICOAGULATION THERAPY VISIT (OUTPATIENT)
Dept: PEDIATRICS | Facility: CLINIC | Age: 81
End: 2021-09-23

## 2021-09-23 ENCOUNTER — TRANSFERRED RECORDS (OUTPATIENT)
Dept: HEALTH INFORMATION MANAGEMENT | Facility: CLINIC | Age: 81
End: 2021-09-23

## 2021-09-23 DIAGNOSIS — I48.20 CHRONIC ATRIAL FIBRILLATION (H): ICD-10-CM

## 2021-09-23 DIAGNOSIS — Z79.01 LONG TERM CURRENT USE OF ANTICOAGULANT THERAPY: Primary | ICD-10-CM

## 2021-09-23 LAB — INR (EXTERNAL): 3 (ref 2–3.5)

## 2021-09-23 NOTE — PROGRESS NOTES
ANTICOAGULATION MANAGEMENT     aMlcolm HENDERSON [facility] 81 year old male is on warfarin with therapeutic INR result. (Goal INR 2.0-3.0)    Recent labs: (last 7 days)     09/23/21  1231   INR 3.0*       ASSESSMENT     Source(s): Chart Review and Patient/Caregiver Call       Warfarin doses taken: Warfarin taken as instructed    Diet: No new diet changes identified    New illness, injury, or hospitalization: No    Medication/supplement changes: None noted    Signs or symptoms of bleeding or clotting: No    Previous INR: Therapeutic last 2(+) visits    Additional findings: None     PLAN     Recommended plan for no diet, medication or health factor changes affecting INR     Dosing Instructions: Continue your current warfarin dose with next INR in 6 weeks   (patient has been stabl since 7/8)    Summary  As of 9/23/2021    Full warfarin instructions:  2.5 mg every Tue, Sat; 5 mg all other days   Next INR check:  10/7/2021             Telephone call with Malcolm who verbalizes understanding and agrees to plan    Patient using outside facility for labs    Education provided: Please call back if any changes to your diet, medications or how you've been taking warfarin    Plan made per [facility] anticoagulation protocol    Shirley Newton RN  Anticoagulation Clinic  9/23/2021    _______________________________________________________________________     Anticoagulation Episode Summary     Current INR goal:  2.0-3.0   TTR:  82.7 % (1 y)   Target end date:  Indefinite   Send INR reminders to:  [facility]    Indications    Long term current use of anticoagulant therapy [Z79.01]  Chronic atrial fibrillation (H) [I48.20]           Comments:  5mg tabs // APPT CARD // cell phone [phone]         Anticoagulation Care Providers     Provider Role Specialty Phone number    Tod Callahan MD Referring Internal Medicine - Pediatrics [phone]    Leisa Mistry MD Referring Internal Medicine - Pediatrics [phone]

## 2021-09-28 ENCOUNTER — TRANSFERRED RECORDS (OUTPATIENT)
Dept: HEALTH INFORMATION MANAGEMENT | Facility: CLINIC | Age: 81
End: 2021-09-28

## 2021-10-21 ENCOUNTER — ANTICOAGULATION THERAPY VISIT (OUTPATIENT)
Dept: PEDIATRICS | Facility: CLINIC | Age: 81
End: 2021-10-21

## 2021-10-21 ENCOUNTER — TRANSFERRED RECORDS (OUTPATIENT)
Dept: HEALTH INFORMATION MANAGEMENT | Facility: CLINIC | Age: 81
End: 2021-10-21

## 2021-10-21 DIAGNOSIS — Z79.01 LONG TERM CURRENT USE OF ANTICOAGULANT THERAPY: Primary | ICD-10-CM

## 2021-10-21 DIAGNOSIS — I48.20 CHRONIC ATRIAL FIBRILLATION (H): ICD-10-CM

## 2021-10-21 LAB — INR (EXTERNAL): 3.3 (ref 2–3.5)

## 2021-10-21 NOTE — PROGRESS NOTES
ANTICOAGULATION MANAGEMENT     Malcoml HENDERSON [facility] 81 year old male is on warfarin with supratherapeutic INR result. (Goal INR 2.0-3.0)    Recent labs: (last 7 days)     10/21/21  1118   INR 3.3*       ASSESSMENT     Source(s): Chart Review and Patient/Caregiver Call       Warfarin doses taken: Warfarin taken as instructed    Diet: Decreased greens/vitamin K in diet; plans to resume previous intake    New illness, injury, or hospitalization: Yes: Patient reports he had a cardioversion on 10/18/21 for atrial fibrillation     Medication/supplement changes: Patient reports he will be starting sotalol 80 mg BID on 10/25/21.     Signs or symptoms of bleeding or clotting: No    Previous INR: Therapeutic last 2(+) visits    Additional findings: Upcoming surgery/procedure Patient reports he has another cardioversion scheduled on 10/27/21    -Patient states he has already sent his INR result from today to his cardiology office. Patient reports he will be speaking with his cardiologist later today.     PLAN     Recommended plan for temporary change(s) affecting INR     Dosing Instructions: Hold dose then continue your current warfarin dose with next INR in 5 days       Summary  As of 10/21/2021    Full warfarin instructions:  10/21: 2.5 mg; Otherwise 2.5 mg every Tue, Sat; 5 mg all other days   Next INR check:  10/26/2021             Telephone call with Malcolm who verbalizes understanding and agrees to plan    Lab visit scheduled    Education provided: Please call back if any changes to your diet, medications or how you've been taking warfarin    Plan made per [facility] anticoagulation protocol    Anaya Mcdonough RN  Anticoagulation Clinic  10/21/2021    _______________________________________________________________________     Anticoagulation Episode Summary     Current INR goal:  2.0-3.0   TTR:  75.1 % (1 y)   Target end date:  Indefinite   Send INR reminders to:  [facility]    Indications    Long term current use of  anticoagulant therapy [Z79.01]  Chronic atrial fibrillation (H) [I48.20]           Comments:  5mg tabs // APPT CARD // cell phone [phone]         Anticoagulation Care Providers     Provider Role Specialty Phone number    Tod Callahan MD Referring Internal Medicine - Pediatrics [phone]    Leisa Mistry MD Referring Internal Medicine - Pediatrics [phone]

## 2021-10-27 ENCOUNTER — TRANSFERRED RECORDS (OUTPATIENT)
Dept: HEALTH INFORMATION MANAGEMENT | Facility: CLINIC | Age: 81
End: 2021-10-27
Payer: COMMERCIAL

## 2021-10-27 LAB
INR (EXTERNAL): 3.4
POTASSIUM (EXTERNAL): 4.9 MMOL/L (ref 3.5–5)

## 2021-10-28 ENCOUNTER — ANTICOAGULATION THERAPY VISIT (OUTPATIENT)
Dept: ANTICOAGULATION | Facility: CLINIC | Age: 81
End: 2021-10-28

## 2021-10-28 DIAGNOSIS — I48.20 CHRONIC ATRIAL FIBRILLATION (H): ICD-10-CM

## 2021-10-28 DIAGNOSIS — Z79.01 LONG TERM CURRENT USE OF ANTICOAGULANT THERAPY: Primary | ICD-10-CM

## 2021-10-28 NOTE — PROGRESS NOTES
ANTICOAGULATION MANAGEMENT     Malcolm HENDERSON [facility] 81 year old male is on warfarin with supratherapeutic INR result. (Goal INR 2.0-3.0)    Recent labs: (last 7 days)     10/27/21  0807   INR 3.4*       ASSESSMENT     Source(s): Chart Review and Patient/Caregiver Call       Warfarin doses taken: Warfarin taken as instructed    Diet: No new diet changes identified    New illness, injury, or hospitalization: cardioversion scheduled on 10/27/21 was cancelled.    Medication/supplement changes: Started sotalol on 10/25/21. Per [employer]: No interactions with warfarin found.    Signs or symptoms of bleeding or clotting: No    Previous INR: Supratherapeutic    Additional findings: None     PLAN     Recommended plan for ongoing change(s) affecting INR     Dosing Instructions:  Decrease your warfarin dose (8.3% change) with next INR in 2 weeks       Summary  As of 10/28/2021    Full warfarin instructions:  2.5 mg every Tue, Sat; 5 mg all other days   Next INR check:  11/9/2021             Telephone call with  Jaskaran who agrees to plan and repeated back plan correctly    Lab visit scheduled    Education provided: Contact [phone]  with any changes, questions or concerns.     Plan made per [facility] anticoagulation protocol    Vianca Kent RN  Anticoagulation Clinic  10/28/2021    _______________________________________________________________________     Anticoagulation Episode Summary     Current INR goal:  2.0-3.0   TTR:  73.4 % (1 y)   Target end date:  Indefinite   Send INR reminders to:  [facility]    Indications    Long term current use of anticoagulant therapy [Z79.01]  Chronic atrial fibrillation (H) [I48.20]           Comments:  5mg tabs // APPT CARD // cell phone [phone]         Anticoagulation Care Providers     Provider Role Specialty Phone number    Tod Callahan MD Referring Internal Medicine - Pediatrics [phone]    Leisa Mistry MD Referring Internal Medicine - Pediatrics [phone]

## 2021-11-08 DIAGNOSIS — N52.9 ERECTILE DYSFUNCTION, UNSPECIFIED ERECTILE DYSFUNCTION TYPE: ICD-10-CM

## 2021-11-09 ENCOUNTER — LAB (OUTPATIENT)
Dept: LAB | Facility: CLINIC | Age: 81
End: 2021-11-09
Payer: COMMERCIAL

## 2021-11-09 ENCOUNTER — ANTICOAGULATION THERAPY VISIT (OUTPATIENT)
Dept: ANTICOAGULATION | Facility: CLINIC | Age: 81
End: 2021-11-09

## 2021-11-09 DIAGNOSIS — Z79.01 LONG TERM CURRENT USE OF ANTICOAGULANT THERAPY: ICD-10-CM

## 2021-11-09 DIAGNOSIS — I48.20 CHRONIC ATRIAL FIBRILLATION (H): ICD-10-CM

## 2021-11-09 DIAGNOSIS — Z79.01 LONG TERM CURRENT USE OF ANTICOAGULANT THERAPY: Primary | ICD-10-CM

## 2021-11-09 LAB — INR BLD: 3 (ref 0.9–1.1)

## 2021-11-09 PROCEDURE — 85610 PROTHROMBIN TIME: CPT

## 2021-11-09 PROCEDURE — 36415 COLL VENOUS BLD VENIPUNCTURE: CPT

## 2021-11-09 RX ORDER — SILDENAFIL CITRATE 20 MG/1
TABLET ORAL
Qty: 30 TABLET | Refills: 1 | Status: SHIPPED | OUTPATIENT
Start: 2021-11-09 | End: 2023-01-01

## 2021-11-09 NOTE — PROGRESS NOTES
ANTICOAGULATION MANAGEMENT     Malcolm HENDERSON [facility] 81 year old male is on warfarin with therapeutic INR result. (Goal INR 2.0-3.0)    Recent labs: (last 7 days)     11/09/21  0941   INR 3.0*       ASSESSMENT     Source(s): Chart Review and Patient/Caregiver Call       Warfarin doses taken: Warfarin taken as instructed    Diet: No new diet changes identified    New illness, injury, or hospitalization: No    Medication/supplement changes: None noted    Signs or symptoms of bleeding or clotting: No    Previous INR: Supratherapeutic    Additional findings: Had a cardioversion scheduled for 11/8/21, but it was cancelled. He was back in sinus rhythm.  Going to his son's in [place] for Thanksgiving   PLAN     Recommended plan for no diet, medication or health factor changes affecting INR     Dosing Instructions: Continue your current warfarin dose with next INR in 3 weeks       Summary  As of 11/9/2021    Full warfarin instructions:  2.5 mg every Tue, Thu, Sat; 5 mg all other days   Next INR check:  11/30/2021             Telephone call with  Jaskaran who agrees to plan and repeated back plan correctly    Lab visit scheduled    Education provided: Contact [phone]  with any changes, questions or concerns.     Plan made per [facility] anticoagulation protocol    Vianca Kent RN  Anticoagulation Clinic  11/9/2021    _______________________________________________________________________     Anticoagulation Episode Summary     Current INR goal:  2.0-3.0   TTR:  69.8 % (1 y)   Target end date:  Indefinite   Send INR reminders to:  [facility]    Indications    Long term current use of anticoagulant therapy [Z79.01]  Chronic atrial fibrillation (H) [I48.20]           Comments:  5mg tabs // APPT CARD // cell phone [phone]         Anticoagulation Care Providers     Provider Role Specialty Phone number    Tod Callahan MD Referring Internal Medicine - Pediatrics [phone]    Leisa Mistry MD Referring Internal  Medicine - Pediatrics [phone]

## 2021-11-11 ENCOUNTER — OFFICE VISIT (OUTPATIENT)
Dept: URGENT CARE | Facility: URGENT CARE | Age: 81
End: 2021-11-11
Payer: COMMERCIAL

## 2021-11-11 VITALS
SYSTOLIC BLOOD PRESSURE: 95 MMHG | OXYGEN SATURATION: 96 % | TEMPERATURE: 97.2 F | DIASTOLIC BLOOD PRESSURE: 60 MMHG | HEART RATE: 77 BPM

## 2021-11-11 DIAGNOSIS — N18.31 STAGE 3A CHRONIC KIDNEY DISEASE (H): ICD-10-CM

## 2021-11-11 DIAGNOSIS — Z79.01 LONG TERM CURRENT USE OF ANTICOAGULANT THERAPY: ICD-10-CM

## 2021-11-11 DIAGNOSIS — I50.22 CHRONIC SYSTOLIC CONGESTIVE HEART FAILURE (H): ICD-10-CM

## 2021-11-11 DIAGNOSIS — M79.661 PAIN OF RIGHT LOWER LEG: Primary | ICD-10-CM

## 2021-11-11 DIAGNOSIS — R60.0 EDEMA OF BOTH LOWER EXTREMITIES: ICD-10-CM

## 2021-11-11 DIAGNOSIS — M79.661 PAIN OF RIGHT LOWER LEG: ICD-10-CM

## 2021-11-11 DIAGNOSIS — D64.9 ANEMIA, UNSPECIFIED TYPE: ICD-10-CM

## 2021-11-11 LAB
ALBUMIN SERPL-MCNC: 3.4 G/DL (ref 3.4–5)
ALP SERPL-CCNC: 79 U/L (ref 40–150)
ALT SERPL W P-5'-P-CCNC: 28 U/L
ANION GAP SERPL CALCULATED.3IONS-SCNC: 1 MMOL/L (ref 3–14)
AST SERPL W P-5'-P-CCNC: 22 U/L (ref 0–45)
BILIRUB SERPL-MCNC: 0.9 MG/DL (ref 0.2–1.3)
BUN SERPL-MCNC: 34 MG/DL (ref 7–30)
CALCIUM SERPL-MCNC: 9.3 MG/DL (ref 8.5–10.1)
CHLORIDE BLD-SCNC: 102 MMOL/L (ref 94–109)
CO2 SERPL-SCNC: 34 MMOL/L (ref 20–32)
CREAT SERPL-MCNC: 1.3 MG/DL (ref 0.66–1.25)
D DIMER PPP FEU-MCNC: <0.27 UG/ML FEU (ref 0–0.5)
ERYTHROCYTE [DISTWIDTH] IN BLOOD BY AUTOMATED COUNT: 13.3 % (ref 10–15)
GFR SERPL CREATININE-BSD FRML MDRD: 52 ML/MIN/1.73M2
GLUCOSE BLD-MCNC: 102 MG/DL (ref 70–99)
HCT VFR BLD AUTO: 37.6 % (ref 40–53)
HGB BLD-MCNC: 12.9 G/DL (ref 13.3–17.7)
MCH RBC QN AUTO: 33.9 PG (ref 26.5–33)
MCHC RBC AUTO-ENTMCNC: 34.3 G/DL (ref 31.5–36.5)
MCV RBC AUTO: 99 FL (ref 78–100)
NT-PROBNP SERPL-MCNC: 5048 PG/ML (ref 0–450)
PLATELET # BLD AUTO: 214 10E3/UL (ref 150–450)
POTASSIUM BLD-SCNC: 3.6 MMOL/L (ref 3.4–5.3)
PROT SERPL-MCNC: 6.2 G/DL (ref 6.8–8.8)
RBC # BLD AUTO: 3.8 10E6/UL (ref 4.4–5.9)
SODIUM SERPL-SCNC: 137 MMOL/L (ref 133–144)
WBC # BLD AUTO: 6.7 10E3/UL (ref 4–11)

## 2021-11-11 PROCEDURE — 85379 FIBRIN DEGRADATION QUANT: CPT

## 2021-11-11 PROCEDURE — 99214 OFFICE O/P EST MOD 30 MIN: CPT | Performed by: PHYSICIAN ASSISTANT

## 2021-11-11 PROCEDURE — 83880 ASSAY OF NATRIURETIC PEPTIDE: CPT

## 2021-11-11 PROCEDURE — 80053 COMPREHEN METABOLIC PANEL: CPT

## 2021-11-11 PROCEDURE — 85027 COMPLETE CBC AUTOMATED: CPT

## 2021-11-11 PROCEDURE — 36415 COLL VENOUS BLD VENIPUNCTURE: CPT

## 2021-11-11 NOTE — Clinical Note
Sanjiv Callahan!   I saw Jaskaran in urgent care today for R calf pain. D dimer negative (and he is on warfarin) so DVT very unlikely. Suspect tendonitis/calf strain or Baker's cyst. His labs showed a mild anemia, however- so wanted to make you aware of this.     Chantal Murphy PA-C

## 2021-11-11 NOTE — PROGRESS NOTES
Assessment/Plan:    Pt low probability of DVT per Wells' criteria, so D-dimer ordered and was negative. Pt is also on warfarin (at therapeutic dose), so DVT unlikely.  CMP unremarkable- no hepatic disease, kidney function stable.   Mild anemia noted on CBC- f/u with PCP.   N Terminal Pro BNP ordered to rule out acute heart failure- this is pending. No other symptoms suggestive of acute HF.   Pt with edema in BLE but this is not unusual for him. Continue Bumex.  Suspect symptoms may be due to ruptured Baker's cyst vs Achilles tendonitis. Advised RICE and Tylenol (no NSAIDs due to being on anticoagulant and hx of CKD), f/u with PCP if symptoms persist.    See patient instructions below.    At the end of the encounter, I discussed results, diagnosis, medications. Discussed red flags for immediate return to clinic/ER, as well as indications for follow up if no improvement. Patient understood and agreed to plan. Patient was stable for discharge.      ICD-10-CM    1. Pain of right lower leg  M79.661 D dimer, quantitative   2. Edema of both lower extremities  R60.0 Comprehensive metabolic panel (BMP + Alb, Alk Phos, ALT, AST, Total. Bili, TP)     CBC with platelets and differential   3. Chronic systolic congestive heart failure (H)  I50.22 BNP-N terminal pro   4. Stage 3a chronic kidney disease (H)  N18.31    5. Long term current use of anticoagulant therapy  Z79.01    6. Anemia, unspecified type  D64.9          Return in about 2 weeks (around 11/25/2021) for Follow up w/ primary care provider if not better.    YANIRA Moncada, PAKALLIE ANDRADE [facility]  -----------------------------------------------------------------------------------------------------------------------------------------------------    HPI:  Malcolm Barker is a 81 year old male with hx of stage 3 CKD, CHF, ischemic cardiomyopathy, HTN, HLD, CAD/MI, and Afib who presents for evaluation of R calf pain onset 1 week ago. Pain  is worse when walking or flexing the foot. No injury. He used to play tennis and reports he does get occasional Achilles tendonitis. He has swelling in BLE which is not unusual for him. He does get SOB occasionally but this is not new or worsening. Patient reports no fever/chills, weight gain, PND, orthopnea, erythema/bruising, chest pain, abdominal pain, rash, or any other symptoms. Patient reports no history of DVT/PE, recent travel/surgery, tobacco use, or history of cancer.    Pt takes warfarin and Plavix. INR was checked on 11/9 and was 3.0.  He also takes Bumex 1 mg daily.    Past Medical History:   Diagnosis Date     Contracture of palmar fascia     both hands, mild     Esophageal reflux      Essential hypertension, benign      Generalized osteoarthrosis, unspecified site     L shoulder, r hip..  improved now with exercise, glucosamine     Glaucoma      Hernia, abdominal      Hypertrophy (benign) of prostate     mild slowing       Vitals:    11/11/21 1559   BP: 95/60   Pulse: 77   Temp: 97.2  F (36.2  C)   SpO2: 96%       Physical Exam  Vitals and nursing note reviewed.   Cardiovascular:      Pulses:           Dorsalis pedis pulses are 2+ on the right side and 2+ on the left side.   Pulmonary:      Effort: Pulmonary effort is normal.      Breath sounds: Normal breath sounds.   Musculoskeletal:      Right lower leg: Tenderness (R calf) present. 3+ Pitting Edema present.      Left lower leg: 3+ Pitting Edema present.      Comments: Some tenderness to shins bilaterally; no erythema/bruising    No difference in size between R and L calf   Neurological:      Mental Status: He is alert.         Labs/Imaging:  No results found for this or any previous visit (from the past 24 hour(s)).  Results for orders placed or performed in visit on 11/11/21   Comprehensive metabolic panel (BMP + Alb, Alk Phos, ALT, AST, Total. Bili, TP)     Status: Abnormal   Result Value Ref Range    Sodium 137 133 - 144 mmol/L    Potassium 3.6  3.4 - 5.3 mmol/L    Chloride 102 94 - 109 mmol/L    Carbon Dioxide (CO2) 34 (H) 20 - 32 mmol/L    Anion Gap 1 (L) 3 - 14 mmol/L    Urea Nitrogen 34 (H) 7 - 30 mg/dL    Creatinine 1.30 (H) 0.66 - 1.25 mg/dL    Calcium 9.3 8.5 - 10.1 mg/dL    Glucose 102 (H) 70 - 99 mg/dL    Alkaline Phosphatase 79 40 - 150 U/L    AST 22 0 - 45 U/L    ALT 28 U/L    Protein Total 6.2 (L) 6.8 - 8.8 g/dL    Albumin 3.4 3.4 - 5.0 g/dL    Bilirubin Total 0.9 0.2 - 1.3 mg/dL    GFR Estimate 52 (L) >60 mL/min/1.73m2   D dimer, quantitative     Status: Normal   Result Value Ref Range    D-Dimer Quantitative <0.27 0.00 - 0.50 ug/mL FEU    Narrative    This D-dimer assay is intended for use in conjunction with a clinical pretest probability assessment model to exclude pulmonary embolism (PE) and deep venous thrombosis (DVT) in outpatients suspected of PE or DVT. The cut-off value is 0.50 ug/mL FEU.   CBC with platelets and differential     Status: Abnormal   Result Value Ref Range    WBC Count 6.7 4.0 - 11.0 10e3/uL    RBC Count 3.80 (L) 4.40 - 5.90 10e6/uL    Hemoglobin 12.9 (L) 13.3 - 17.7 g/dL    Hematocrit 37.6 (L) 40.0 - 53.0 %    MCV 99 78 - 100 fL    MCH 33.9 (H) 26.5 - 33.0 pg    MCHC 34.3 31.5 - 36.5 g/dL    RDW 13.3 10.0 - 15.0 %    Platelet Count 214 150 - 450 10e3/uL    Narrative    Result confirmed by repeat test.     CBC with platelets and differential     Status: Abnormal    Narrative    The following orders were created for panel order CBC with platelets and differential.  Procedure                               Abnormality         Status                     ---------                               -----------         ------                     CBC with platelets and d...[[id number]]  Abnormal            Final result                 Please view results for these tests on the individual orders.       Patient Instructions   1) Take Tylenol up to 1000 mg four times a day for pain as needed.  2) Ice to the affected area 20 minutes  three times daily.  3) Elevate the affected injury above the level of the heart when you are able to.   4) Compress the affected area using an ACE wrap or brace. This will help to keep swelling down and helps with pain relief.   5) Follow up in 10-14 days if not improving, sooner if worsening.

## 2021-11-12 NOTE — PATIENT INSTRUCTIONS
1) Take Tylenol up to 1000 mg four times a day for pain as needed.  2) Ice to the affected area 20 minutes three times daily.  3) Elevate the affected injury above the level of the heart when you are able to.   4) Compress the affected area using an ACE wrap or brace. This will help to keep swelling down and helps with pain relief.   5) Follow up in 10-14 days if not improving, sooner if worsening.

## 2021-11-30 ENCOUNTER — LAB (OUTPATIENT)
Dept: LAB | Facility: CLINIC | Age: 81
End: 2021-11-30
Payer: COMMERCIAL

## 2021-11-30 ENCOUNTER — ANTICOAGULATION THERAPY VISIT (OUTPATIENT)
Dept: ANTICOAGULATION | Facility: CLINIC | Age: 81
End: 2021-11-30

## 2021-11-30 DIAGNOSIS — Z79.01 LONG TERM CURRENT USE OF ANTICOAGULANT THERAPY: Primary | ICD-10-CM

## 2021-11-30 DIAGNOSIS — Z79.01 LONG TERM CURRENT USE OF ANTICOAGULANT THERAPY: ICD-10-CM

## 2021-11-30 DIAGNOSIS — I48.20 CHRONIC ATRIAL FIBRILLATION (H): ICD-10-CM

## 2021-11-30 LAB — INR BLD: 3.3 (ref 0.9–1.1)

## 2021-11-30 PROCEDURE — 36416 COLLJ CAPILLARY BLOOD SPEC: CPT

## 2021-11-30 PROCEDURE — 85610 PROTHROMBIN TIME: CPT

## 2021-11-30 NOTE — PROGRESS NOTES
ANTICOAGULATION MANAGEMENT     Malcolm HENDERSON [facility] 81 year old male is on warfarin with supratherapeutic INR result. (Goal INR 2.0-3.0)    Recent labs: (last 7 days)     11/30/21  1409   INR 3.3*       ASSESSMENT     Source(s): Chart Review and Patient/Caregiver Call       Warfarin doses taken: Warfarin taken as instructed    Diet: Patient reports possible less green veggie intake but is not sure.    New illness, injury, or hospitalization: No    Medication/supplement changes: None noted    Signs or symptoms of bleeding or clotting: No    Previous INR: Therapeutic last visit; previously outside of goal range    Additional findings: Upcoming surgery/procedure Patient reports a pacemaker battery change on 1/13/22, will need INR 1/10/22, if INR is in goal range, he will not need to hold warfarin, patient reports he does not need to have a pre-op physical, patient requested to have pre-op and INR lab appt cancelled on 12/10/21.     PLAN     Recommended plan for no diet, medication or health factor changes affecting INR     Dosing Instructions: Hold dose then continue your current warfarin dose with next INR in 2 weeks       Summary  As of 11/30/2021    Full warfarin instructions:  11/30: Hold; Otherwise 2.5 mg every Tue, Thu, Sat; 5 mg all other days   Next INR check:  12/14/2021             Telephone call with Malcolm who agrees to plan and repeated back plan correctly    Lab visit scheduled    Education provided: Please call back if any changes to your diet, medications or how you've been taking warfarin and Contact [phone]  with any changes, questions or concerns.     Plan made per [facility] anticoagulation protocol    Deb Reddy RN  Anticoagulation Clinic  11/30/2021    _______________________________________________________________________     Anticoagulation Episode Summary     Current INR goal:  2.0-3.0   TTR:  64.1 % (1 y)   Target end date:  Indefinite   Send INR reminders to:  [facility]     Indications    Long term current use of anticoagulant therapy [Z79.01]  Chronic atrial fibrillation (H) [I48.20]           Comments:  5mg tabs // APPT CARD // cell phone [phone]         Anticoagulation Care Providers     Provider Role Specialty Phone number    Tod Callahan MD Referring Internal Medicine - Pediatrics [phone]    Leisa Mistry MD Referring Internal Medicine - Pediatrics [phone]

## 2021-12-10 ENCOUNTER — TRANSCRIBE ORDERS (OUTPATIENT)
Dept: OTHER | Age: 81
End: 2021-12-10

## 2021-12-10 DIAGNOSIS — I25.5 ISCHEMIC CARDIOMYOPATHY: Primary | ICD-10-CM

## 2021-12-15 ENCOUNTER — ANTICOAGULATION THERAPY VISIT (OUTPATIENT)
Dept: ANTICOAGULATION | Facility: CLINIC | Age: 81
End: 2021-12-15

## 2021-12-15 ENCOUNTER — TRANSCRIBE ORDERS (OUTPATIENT)
Dept: OTHER | Age: 81
End: 2021-12-15

## 2021-12-15 ENCOUNTER — LAB (OUTPATIENT)
Dept: LAB | Facility: CLINIC | Age: 81
End: 2021-12-15
Payer: COMMERCIAL

## 2021-12-15 DIAGNOSIS — I48.20 CHRONIC ATRIAL FIBRILLATION (H): ICD-10-CM

## 2021-12-15 DIAGNOSIS — I50.22 CHRONIC SYSTOLIC HEART FAILURE (H): Primary | ICD-10-CM

## 2021-12-15 DIAGNOSIS — Z79.01 LONG TERM CURRENT USE OF ANTICOAGULANT THERAPY: ICD-10-CM

## 2021-12-15 DIAGNOSIS — Z79.01 LONG TERM CURRENT USE OF ANTICOAGULANT THERAPY: Primary | ICD-10-CM

## 2021-12-15 LAB — INR BLD: 3.1 (ref 0.9–1.1)

## 2021-12-15 PROCEDURE — 85610 PROTHROMBIN TIME: CPT

## 2021-12-15 PROCEDURE — 36416 COLLJ CAPILLARY BLOOD SPEC: CPT

## 2021-12-15 NOTE — PROGRESS NOTES
ANTICOAGULATION MANAGEMENT     Malcolm HENDERSON [facility] 81 year old male is on warfarin with supratherapeutic INR result. (Goal INR 2.0-3.0)    Recent labs: (last 7 days)     12/15/21  1143   INR 3.1*       ASSESSMENT     Source(s): Chart Review and Patient/Caregiver Call       Warfarin doses taken: Warfarin taken as instructed    Diet: No new diet changes identified    New illness, injury, or hospitalization: No    Medication/supplement changes: None noted    Signs or symptoms of bleeding or clotting: No    Previous INR: Supratherapeutic    Additional findings: Upcoming surgery/procedure Pacemaker battery replacement 1/13/21     PLAN     Recommended plan for no diet, medication or health factor changes affecting INR. If INR continues to be supratherpeutic 12/30/21, consider warfarin maintenance dose decrease.     Dosing Instructions: Continue your current warfarin dose with next INR in 2 weeks       Summary  As of 12/15/2021    Full warfarin instructions:  2.5 mg every Tue, Thu, Sat; 5 mg all other days   Next INR check:  12/30/2021             Telephone call with Malcolm who verbalizes understanding and agrees to plan    Lab visit scheduled    Education provided: Please call back if any changes to your diet, medications or how you've been taking warfarin and Contact [phone]  with any changes, questions or concerns.     Plan made per [facility] anticoagulation protocol    Deb Reddy RN  Anticoagulation Clinic  12/15/2021    _______________________________________________________________________     Anticoagulation Episode Summary     Current INR goal:  2.0-3.0   TTR:  60.0 % (1 y)   Target end date:  Indefinite   Send INR reminders to:  [facility]    Indications    Long term current use of anticoagulant therapy [Z79.01]  Chronic atrial fibrillation (H) [I48.20]           Comments:  5mg tabs // APPT CARD // cell phone [phone]         Anticoagulation Care Providers     Provider Role Specialty Phone number     Tod Callahan MD Referring Internal Medicine - Pediatrics [phone]    Leisa Mistry MD Referring Internal Medicine - Pediatrics [phone]

## 2021-12-23 ENCOUNTER — HOSPITAL ENCOUNTER (OUTPATIENT)
Dept: CARDIAC REHAB | Facility: CLINIC | Age: 81
End: 2021-12-23
Payer: COMMERCIAL

## 2021-12-23 ENCOUNTER — MEDICAL CORRESPONDENCE (OUTPATIENT)
Dept: HEALTH INFORMATION MANAGEMENT | Facility: CLINIC | Age: 81
End: 2021-12-23

## 2021-12-23 DIAGNOSIS — I50.22 CHRONIC SYSTOLIC HEART FAILURE (H): ICD-10-CM

## 2021-12-23 PROCEDURE — 93797 PHYS/QHP OP CAR RHAB WO ECG: CPT | Mod: XU | Performed by: OCCUPATIONAL THERAPIST

## 2021-12-23 PROCEDURE — 999N000109 HC STATISTIC OP CR VISIT: Performed by: OCCUPATIONAL THERAPIST

## 2021-12-23 PROCEDURE — 93798 PHYS/QHP OP CAR RHAB W/ECG: CPT | Performed by: OCCUPATIONAL THERAPIST

## 2021-12-23 PROCEDURE — 999N000108 HC STATISTIC OP CARDIAC VISIT #2: Performed by: OCCUPATIONAL THERAPIST

## 2021-12-24 DIAGNOSIS — I50.22 CHRONIC SYSTOLIC HF (HEART FAILURE) (H): Primary | ICD-10-CM

## 2021-12-25 ENCOUNTER — HEALTH MAINTENANCE LETTER (OUTPATIENT)
Age: 81
End: 2021-12-25

## 2022-01-01 ENCOUNTER — TELEPHONE (OUTPATIENT)
Dept: ANTICOAGULATION | Facility: CLINIC | Age: 82
End: 2022-01-01

## 2022-01-01 ENCOUNTER — LAB (OUTPATIENT)
Dept: LAB | Facility: CLINIC | Age: 82
End: 2022-01-01
Payer: COMMERCIAL

## 2022-01-01 ENCOUNTER — TELEPHONE (OUTPATIENT)
Dept: PEDIATRICS | Facility: CLINIC | Age: 82
End: 2022-01-01

## 2022-01-01 ENCOUNTER — ANTICOAGULATION THERAPY VISIT (OUTPATIENT)
Dept: ANTICOAGULATION | Facility: CLINIC | Age: 82
End: 2022-01-01

## 2022-01-01 DIAGNOSIS — I48.20 CHRONIC ATRIAL FIBRILLATION (H): ICD-10-CM

## 2022-01-01 DIAGNOSIS — Z79.01 LONG TERM CURRENT USE OF ANTICOAGULANT THERAPY: Primary | ICD-10-CM

## 2022-01-01 DIAGNOSIS — Z79.01 LONG TERM CURRENT USE OF ANTICOAGULANT THERAPY: ICD-10-CM

## 2022-01-01 LAB
INR (EXTERNAL): 2.9 (ref 0.9–1.1)
INR (EXTERNAL): 3 (ref 0.8–1.1)
INR (EXTERNAL): 3.9 (ref 0.9–1.1)
INR BLD: 1.5 (ref 0.9–1.1)
INR BLD: 2.1 (ref 0.9–1.1)
INR BLD: 3.9 (ref 0.9–1.1)

## 2022-01-01 PROCEDURE — 85610 PROTHROMBIN TIME: CPT

## 2022-01-01 PROCEDURE — 36416 COLLJ CAPILLARY BLOOD SPEC: CPT

## 2022-01-01 PROCEDURE — 36415 COLL VENOUS BLD VENIPUNCTURE: CPT

## 2022-01-01 RX ORDER — WARFARIN SODIUM 5 MG/1
TABLET ORAL
Qty: 78 TABLET | Refills: 0 | Status: SHIPPED | OUTPATIENT
Start: 2022-01-01 | End: 2023-01-01

## 2022-01-01 RX ORDER — WARFARIN SODIUM 5 MG/1
TABLET ORAL
Qty: 78 TABLET | Refills: 0 | Status: SHIPPED
Start: 2022-01-01 | End: 2022-01-01

## 2022-01-03 DIAGNOSIS — G60.3 IDIOPATHIC PROGRESSIVE NEUROPATHY: ICD-10-CM

## 2022-01-03 RX ORDER — GABAPENTIN 300 MG/1
600 CAPSULE ORAL 4 TIMES DAILY
Qty: 720 CAPSULE | Refills: 3 | Status: SHIPPED | OUTPATIENT
Start: 2022-01-03 | End: 2022-09-01

## 2022-01-06 ENCOUNTER — HOSPITAL ENCOUNTER (OUTPATIENT)
Dept: CARDIAC REHAB | Facility: CLINIC | Age: 82
End: 2022-01-06
Attending: INTERNAL MEDICINE
Payer: COMMERCIAL

## 2022-01-06 PROCEDURE — 93798 PHYS/QHP OP CAR RHAB W/ECG: CPT | Performed by: REHABILITATION PRACTITIONER

## 2022-01-07 ENCOUNTER — MEDICAL CORRESPONDENCE (OUTPATIENT)
Dept: HEALTH INFORMATION MANAGEMENT | Facility: CLINIC | Age: 82
End: 2022-01-07
Payer: COMMERCIAL

## 2022-01-10 ENCOUNTER — ANTICOAGULATION THERAPY VISIT (OUTPATIENT)
Dept: ANTICOAGULATION | Facility: CLINIC | Age: 82
End: 2022-01-10

## 2022-01-10 ENCOUNTER — LAB (OUTPATIENT)
Dept: LAB | Facility: CLINIC | Age: 82
End: 2022-01-10
Payer: COMMERCIAL

## 2022-01-10 DIAGNOSIS — I48.20 CHRONIC ATRIAL FIBRILLATION (H): ICD-10-CM

## 2022-01-10 DIAGNOSIS — Z11.52 ENCOUNTER FOR PREPROCEDURE SCREENING LABORATORY TESTING FOR COVID-19: ICD-10-CM

## 2022-01-10 DIAGNOSIS — Z01.812 ENCOUNTER FOR PREPROCEDURE SCREENING LABORATORY TESTING FOR COVID-19: ICD-10-CM

## 2022-01-10 DIAGNOSIS — I50.22 CHRONIC SYSTOLIC HF (HEART FAILURE) (H): ICD-10-CM

## 2022-01-10 DIAGNOSIS — Z79.01 LONG TERM CURRENT USE OF ANTICOAGULANT THERAPY: Primary | ICD-10-CM

## 2022-01-10 DIAGNOSIS — Z79.01 LONG TERM CURRENT USE OF ANTICOAGULANT THERAPY: ICD-10-CM

## 2022-01-10 LAB — INR BLD: 2 (ref 0.9–1.1)

## 2022-01-10 PROCEDURE — 85610 PROTHROMBIN TIME: CPT

## 2022-01-10 PROCEDURE — U0003 INFECTIOUS AGENT DETECTION BY NUCLEIC ACID (DNA OR RNA); SEVERE ACUTE RESPIRATORY SYNDROME CORONAVIRUS 2 (SARS-COV-2) (CORONAVIRUS DISEASE [COVID-19]), AMPLIFIED PROBE TECHNIQUE, MAKING USE OF HIGH THROUGHPUT TECHNOLOGIES AS DESCRIBED BY CMS-2020-01-R: HCPCS

## 2022-01-10 PROCEDURE — 36415 COLL VENOUS BLD VENIPUNCTURE: CPT

## 2022-01-10 PROCEDURE — U0005 INFEC AGEN DETEC AMPLI PROBE: HCPCS

## 2022-01-10 RX ORDER — SOTALOL HYDROCHLORIDE 80 MG/1
80 TABLET ORAL EVERY 12 HOURS
COMMUNITY
Start: 2021-11-22 | End: 2023-01-01

## 2022-01-10 NOTE — PROGRESS NOTES
ANTICOAGULATION MANAGEMENT     Malcolm HENDERSON [facility] 81 year old male is on warfarin with therapeutic INR result. (Goal INR 2.0-3.0)    Recent labs: (last 7 days)     01/10/22  1426   INR 2.0*       ASSESSMENT     Source(s): Chart Review and Patient/Caregiver Call       Warfarin doses taken: Warfarin taken as instructed    Diet: No new diet changes identified, but he did have a lot of broccoli and coleslaw last night. No change in alcohol intake.    New illness, injury, or hospitalization: No    Medication/supplement changes: started on sotalol. Should not have an effect on INR.    Signs or symptoms of bleeding or clotting: No    Previous INR: Supratherapeutic    Additional findings: Upcoming surgery/procedure - Pacemaker battery change on 1/13/22. He will not need to hold warfarin.     PLAN     Recommended plan for no diet, medication or health factor changes affecting INR     Dosing Instructions: Continue your current warfarin dose with next INR in 3 weeks       Summary  As of 1/10/2022    Full warfarin instructions:  2.5 mg every Tue, Thu, Sat; 5 mg all other days   Next INR check:  1/31/2022             Telephone call with  Jaskaran who agrees to plan and repeated back plan correctly    Lab visit scheduled    Education provided: Importance of consistent vitamin K intake, No interaction anticipated between warfarin and sotalol and Contact [phone]  with any changes, questions or concerns.     Plan made per [facility] anticoagulation protocol    Vianca Kent RN  Anticoagulation Clinic  1/10/2022    _______________________________________________________________________     Anticoagulation Episode Summary     Current INR goal:  2.0-3.0   TTR:  59.3 % (1 y)   Target end date:  Indefinite   Send INR reminders to:  [facility]    Indications    Long term current use of anticoagulant therapy [Z79.01]  Chronic atrial fibrillation (H) [I48.20]           Comments:  5mg tabs // APPT CARD // cell phone [phone]          Anticoagulation Care Providers     Provider Role Specialty Phone number    Tod Callahan MD Referring Internal Medicine - Pediatrics [phone]    Leisa Mistry MD Referring Internal Medicine - Pediatrics [phone]

## 2022-01-11 ENCOUNTER — HOSPITAL ENCOUNTER (OUTPATIENT)
Dept: CARDIAC REHAB | Facility: CLINIC | Age: 82
End: 2022-01-11
Attending: INTERNAL MEDICINE
Payer: COMMERCIAL

## 2022-01-11 LAB — SARS-COV-2 RNA RESP QL NAA+PROBE: NEGATIVE

## 2022-01-11 PROCEDURE — 93798 PHYS/QHP OP CAR RHAB W/ECG: CPT

## 2022-01-14 ENCOUNTER — MYC MEDICAL ADVICE (OUTPATIENT)
Dept: PEDIATRICS | Facility: CLINIC | Age: 82
End: 2022-01-14
Payer: COMMERCIAL

## 2022-01-17 DIAGNOSIS — I50.22 CHRONIC SYSTOLIC HEART FAILURE (H): Primary | ICD-10-CM

## 2022-01-21 ENCOUNTER — HOSPITAL ENCOUNTER (OUTPATIENT)
Dept: CARDIAC REHAB | Facility: CLINIC | Age: 82
End: 2022-01-21
Attending: INTERNAL MEDICINE
Payer: COMMERCIAL

## 2022-01-21 ENCOUNTER — OFFICE VISIT (OUTPATIENT)
Dept: PEDIATRICS | Facility: CLINIC | Age: 82
End: 2022-01-21
Payer: COMMERCIAL

## 2022-01-21 VITALS
OXYGEN SATURATION: 97 % | WEIGHT: 215 LBS | BODY MASS INDEX: 29.36 KG/M2 | HEART RATE: 82 BPM | TEMPERATURE: 96.4 F | DIASTOLIC BLOOD PRESSURE: 60 MMHG | SYSTOLIC BLOOD PRESSURE: 100 MMHG

## 2022-01-21 DIAGNOSIS — I50.22 CHRONIC SYSTOLIC CONGESTIVE HEART FAILURE (H): ICD-10-CM

## 2022-01-21 DIAGNOSIS — N18.31 STAGE 3A CHRONIC KIDNEY DISEASE (H): ICD-10-CM

## 2022-01-21 DIAGNOSIS — I48.0 PAF (PAROXYSMAL ATRIAL FIBRILLATION) (H): ICD-10-CM

## 2022-01-21 PROCEDURE — 93798 PHYS/QHP OP CAR RHAB W/ECG: CPT

## 2022-01-21 PROCEDURE — 99213 OFFICE O/P EST LOW 20 MIN: CPT | Performed by: INTERNAL MEDICINE

## 2022-01-21 NOTE — PROGRESS NOTES
Assessment & Plan       ICD-10-CM    1. Chronic systolic congestive heart failure (H)  I50.22    2. PAF (paroxysmal atrial fibrillation) (H)  I48.0    3. Stage 3a chronic kidney disease (H)  N18.31      Pacemaker implantation site appears intact.   Steri strips left in place.  2x2 gauze and tegaderm given to pt in case needs a dressing.   Should f/u with cardiology as scheduled.       No follow-ups on file.    Tod Callahan MD  [facility] Jessica is a 81 year old who presents for the following health issues     History of Present Illness       He eats 2-3 servings of fruits and vegetables daily.He consumes 1 sweetened beverage(s) daily.He exercises with enough effort to increase his heart rate 20 to 29 minutes per day.  He exercises with enough effort to increase his heart rate 4 days per week.   He is taking medications regularly.       Pace maker was replaced the 13th of January. Bandage and incision need to be checked per cardiology.   Feeling well.  Dressing remains in place from battery replacement.   No arm pain.  No active cardiac symptoms.           Objective    /60   Pulse 82   Temp (!) 96.4  F (35.8  C)   Wt 97.5 kg (215 lb)   SpO2 97%   BMI 29.36 kg/m    Body mass index is 29.36 kg/m .  Physical Exam   GEN: no distress  SKIN: Dressing remove easily from pacemaker site left upper chest. Steri-strips in place, not removed today.  LUNGS: CTA wyatt.  CV: RRR w/ occasional early systole.

## 2022-01-25 ENCOUNTER — LAB (OUTPATIENT)
Dept: LAB | Facility: CLINIC | Age: 82
End: 2022-01-25
Payer: COMMERCIAL

## 2022-01-25 DIAGNOSIS — I50.22 CHRONIC SYSTOLIC HEART FAILURE (H): ICD-10-CM

## 2022-01-25 PROCEDURE — 36415 COLL VENOUS BLD VENIPUNCTURE: CPT

## 2022-01-25 PROCEDURE — 80048 BASIC METABOLIC PNL TOTAL CA: CPT

## 2022-01-26 ENCOUNTER — HOSPITAL ENCOUNTER (OUTPATIENT)
Dept: CARDIAC REHAB | Facility: CLINIC | Age: 82
End: 2022-01-26
Attending: INTERNAL MEDICINE
Payer: COMMERCIAL

## 2022-01-26 LAB
ANION GAP SERPL CALCULATED.3IONS-SCNC: 6 MMOL/L (ref 3–14)
BUN SERPL-MCNC: 35 MG/DL (ref 7–30)
CALCIUM SERPL-MCNC: 9 MG/DL (ref 8.5–10.1)
CHLORIDE BLD-SCNC: 102 MMOL/L (ref 94–109)
CO2 SERPL-SCNC: 30 MMOL/L (ref 20–32)
CREAT SERPL-MCNC: 1.4 MG/DL (ref 0.66–1.25)
GFR SERPL CREATININE-BSD FRML MDRD: 51 ML/MIN/1.73M2
GLUCOSE BLD-MCNC: 105 MG/DL (ref 70–99)
POTASSIUM BLD-SCNC: 4.4 MMOL/L (ref 3.4–5.3)
SODIUM SERPL-SCNC: 138 MMOL/L (ref 133–144)

## 2022-01-26 PROCEDURE — 93798 PHYS/QHP OP CAR RHAB W/ECG: CPT | Performed by: REHABILITATION PRACTITIONER

## 2022-01-28 ENCOUNTER — HOSPITAL ENCOUNTER (OUTPATIENT)
Dept: CARDIAC REHAB | Facility: CLINIC | Age: 82
End: 2022-01-28
Attending: INTERNAL MEDICINE
Payer: COMMERCIAL

## 2022-01-28 PROCEDURE — 93798 PHYS/QHP OP CAR RHAB W/ECG: CPT | Performed by: REHABILITATION PRACTITIONER

## 2022-01-31 ENCOUNTER — ANTICOAGULATION THERAPY VISIT (OUTPATIENT)
Dept: ANTICOAGULATION | Facility: CLINIC | Age: 82
End: 2022-01-31

## 2022-01-31 ENCOUNTER — LAB (OUTPATIENT)
Dept: LAB | Facility: CLINIC | Age: 82
End: 2022-01-31
Payer: COMMERCIAL

## 2022-01-31 DIAGNOSIS — Z79.01 LONG TERM CURRENT USE OF ANTICOAGULANT THERAPY: Primary | ICD-10-CM

## 2022-01-31 DIAGNOSIS — Z79.01 LONG TERM CURRENT USE OF ANTICOAGULANT THERAPY: ICD-10-CM

## 2022-01-31 DIAGNOSIS — I48.20 CHRONIC ATRIAL FIBRILLATION (H): ICD-10-CM

## 2022-01-31 LAB — INR BLD: 3.5 (ref 0.9–1.1)

## 2022-01-31 PROCEDURE — 36415 COLL VENOUS BLD VENIPUNCTURE: CPT

## 2022-01-31 PROCEDURE — 85610 PROTHROMBIN TIME: CPT

## 2022-01-31 NOTE — PROGRESS NOTES
ANTICOAGULATION MANAGEMENT     Malcolm [facility]END[facility] 81 [facility] male is on warfarin with supratherapeutic INR result. (Goal INR 2.0-3.0)    Recent labs: (last 7 days)     01/31/22  1356   INR 3.5*       ASSESSMENT     Source(s): Chart Review and Patient/Caregiver Call       Warfarin doses taken: Warfarin taken as instructed    Diet: Decreased greens/vitamin K in diet; plans to resume previous intake. Increased alcohol yesterday during the football game.    New illness, injury, or hospitalization: No    Medication/supplement changes: None noted    Signs or symptoms of bleeding or clotting: No    Previous INR: Therapeutic last visit; previously outside of goal range    Additional findings: None     PLAN     Recommended plan for ongoing change(s) affecting INR     Dosing Instructions: Partial hold then Decrease your warfarin dose (9.1% change) with next INR in 2 weeks per protocol. He requested 3 weeks.       Summary  As of 1/31/2022    Full warfarin instructions:  1/31: 2.5 mg; Otherwise 5 mg every Mon, Wed, Fri; 2.5 mg all other days   Next INR check:  2/21/2022             Telephone call with  Jaskaran who agrees to plan and repeated back plan correctly    Patient elected to schedule next visit in 3 weeks on 2/21/22.    Education provided: Importance of consistent vitamin K intake, Potential interaction between warfarin and alcohol and Monitoring for bleeding signs and symptoms    Plan made per [facility] anticoagulation protocol    Vianca Kent RN  Anticoagulation Clinic  1/31/2022    _______________________________________________________________________     Anticoagulation Episode Summary     Current INR goal:  2.0-3.0   TTR:  57.4 % (1 y)   Target end date:  Indefinite   Send INR reminders to:  JOHNNIE [facility]ACE[facility]    Indications    Long term current use of anticoagulant therapy [Z79.01]  Chronic atrial fibrillation (H) [I48.20]           Comments:  5mg tabs // APPT CARD // cell phone [phone]         Anticoagulation  Care Providers     Provider Role Specialty Phone number    Tod Callahan MD Referring Internal Medicine - Pediatrics [phone]    Leisa Mistry MD Referring Internal Medicine - Pediatrics [phone]

## 2022-02-01 DIAGNOSIS — Z79.01 LONG TERM CURRENT USE OF ANTICOAGULANT THERAPY: ICD-10-CM

## 2022-02-01 DIAGNOSIS — I48.20 CHRONIC ATRIAL FIBRILLATION (H): ICD-10-CM

## 2022-02-01 RX ORDER — WARFARIN SODIUM 5 MG/1
TABLET ORAL
Qty: 90 TABLET | Refills: 1 | Status: SHIPPED | OUTPATIENT
Start: 2022-02-01 | End: 2022-01-01

## 2022-02-01 NOTE — TELEPHONE ENCOUNTER
Warfarin - Prescription approved per [facility] Refill Protocol.    Rita Eubanks RN   [facility]

## 2022-02-08 ENCOUNTER — HOSPITAL ENCOUNTER (OUTPATIENT)
Dept: CARDIAC REHAB | Facility: CLINIC | Age: 82
End: 2022-02-08
Attending: INTERNAL MEDICINE
Payer: COMMERCIAL

## 2022-02-08 PROCEDURE — 93798 PHYS/QHP OP CAR RHAB W/ECG: CPT

## 2022-02-10 ENCOUNTER — HOSPITAL ENCOUNTER (OUTPATIENT)
Dept: CARDIAC REHAB | Facility: CLINIC | Age: 82
End: 2022-02-10
Attending: INTERNAL MEDICINE
Payer: COMMERCIAL

## 2022-02-10 PROCEDURE — 93798 PHYS/QHP OP CAR RHAB W/ECG: CPT | Performed by: REHABILITATION PRACTITIONER

## 2022-02-14 ENCOUNTER — DOCUMENTATION ONLY (OUTPATIENT)
Dept: LAB | Facility: CLINIC | Age: 82
End: 2022-02-14
Payer: COMMERCIAL

## 2022-02-14 DIAGNOSIS — Z11.52 ENCOUNTER FOR SCREENING FOR COVID-19: Primary | ICD-10-CM

## 2022-02-18 ENCOUNTER — LAB (OUTPATIENT)
Dept: LAB | Facility: CLINIC | Age: 82
End: 2022-02-18
Attending: INTERNAL MEDICINE
Payer: COMMERCIAL

## 2022-02-18 DIAGNOSIS — Z11.52 ENCOUNTER FOR SCREENING FOR COVID-19: ICD-10-CM

## 2022-02-18 PROCEDURE — U0005 INFEC AGEN DETEC AMPLI PROBE: HCPCS

## 2022-02-18 PROCEDURE — U0003 INFECTIOUS AGENT DETECTION BY NUCLEIC ACID (DNA OR RNA); SEVERE ACUTE RESPIRATORY SYNDROME CORONAVIRUS 2 (SARS-COV-2) (CORONAVIRUS DISEASE [COVID-19]), AMPLIFIED PROBE TECHNIQUE, MAKING USE OF HIGH THROUGHPUT TECHNOLOGIES AS DESCRIBED BY CMS-2020-01-R: HCPCS

## 2022-02-19 LAB — SARS-COV-2 RNA RESP QL NAA+PROBE: NEGATIVE

## 2022-02-21 ENCOUNTER — ANTICOAGULATION THERAPY VISIT (OUTPATIENT)
Dept: ANTICOAGULATION | Facility: CLINIC | Age: 82
End: 2022-02-21

## 2022-02-21 ENCOUNTER — LAB (OUTPATIENT)
Dept: LAB | Facility: CLINIC | Age: 82
End: 2022-02-21
Payer: COMMERCIAL

## 2022-02-21 DIAGNOSIS — I48.20 CHRONIC ATRIAL FIBRILLATION (H): ICD-10-CM

## 2022-02-21 DIAGNOSIS — Z79.01 LONG TERM CURRENT USE OF ANTICOAGULANT THERAPY: Primary | ICD-10-CM

## 2022-02-21 DIAGNOSIS — Z79.01 LONG TERM CURRENT USE OF ANTICOAGULANT THERAPY: ICD-10-CM

## 2022-02-21 LAB — INR BLD: 2.8 (ref 0.9–1.1)

## 2022-02-21 PROCEDURE — 85610 PROTHROMBIN TIME: CPT

## 2022-02-21 PROCEDURE — 36416 COLLJ CAPILLARY BLOOD SPEC: CPT

## 2022-02-21 NOTE — PROGRESS NOTES
ANTICOAGULATION MANAGEMENT     Malcolm HENDERSON [facility] 81 year old male is on warfarin with therapeutic INR result. (Goal INR 2.0-3.0)    Recent labs: (last 7 days)     02/21/22  1409   INR 2.8*       ASSESSMENT     Source(s): Chart Review and Patient/Caregiver Call       Warfarin doses taken: Warfarin taken as instructed    Diet: No new diet changes identified    New illness, injury, or hospitalization: No    Medication/supplement changes: None noted    Signs or symptoms of bleeding or clotting: No    Previous INR: Supratherapeutic    Additional findings: Upcoming surgery/procedure - transesophageal echo at [facility] on Wed 2/23/22.     PLAN     Recommended plan for no diet, medication or health factor changes affecting INR     Dosing Instructions: Continue your current warfarin dose with next INR in 4 weeks. He is going on a road trip to the [facility] for about a month. He won't be back until the end of March.      Summary  As of 2/21/2022    Full warfarin instructions:  5 mg every Mon, Wed, Fri; 2.5 mg all other days   Next INR check:  3/30/2022             Telephone call with  Jaskaran who agrees to plan and repeated back plan correctly    Patient elected to schedule next visit 3/30/22    Education provided: Please call back if any changes to your diet, medications or how you've been taking warfarin and Travel related clotting risk and prevention    Plan made per [facility] anticoagulation protocol    Vianca Kent RN  Anticoagulation Clinic  2/21/2022    _______________________________________________________________________     Anticoagulation Episode Summary     Current INR goal:  2.0-3.0   TTR:  53.3 % (1 y)   Target end date:  Indefinite   Send INR reminders to:  JOHNNIE LACEY    Indications    Long term current use of anticoagulant therapy [Z79.01]  Chronic atrial fibrillation (H) [I48.20]           Comments:  5mg tabs // APPT CARD // cell phone [phone]         Anticoagulation Care Providers      Provider Role Specialty Phone number    Tod Callahan MD Referring Internal Medicine - Pediatrics [phone]    Leisa Mistry MD Referring Internal Medicine - Pediatrics [phone]

## 2022-02-23 LAB — EJECTION FRACTION: NORMAL %

## 2022-03-30 ENCOUNTER — ANTICOAGULATION THERAPY VISIT (OUTPATIENT)
Dept: ANTICOAGULATION | Facility: CLINIC | Age: 82
End: 2022-03-30

## 2022-03-30 ENCOUNTER — LAB (OUTPATIENT)
Dept: LAB | Facility: CLINIC | Age: 82
End: 2022-03-30
Payer: COMMERCIAL

## 2022-03-30 DIAGNOSIS — I48.20 CHRONIC ATRIAL FIBRILLATION (H): ICD-10-CM

## 2022-03-30 DIAGNOSIS — Z79.01 LONG TERM CURRENT USE OF ANTICOAGULANT THERAPY: ICD-10-CM

## 2022-03-30 DIAGNOSIS — Z79.01 LONG TERM CURRENT USE OF ANTICOAGULANT THERAPY: Primary | ICD-10-CM

## 2022-03-30 LAB — INR BLD: 2.5 (ref 0.9–1.1)

## 2022-03-30 PROCEDURE — 85610 PROTHROMBIN TIME: CPT

## 2022-03-30 PROCEDURE — 36416 COLLJ CAPILLARY BLOOD SPEC: CPT

## 2022-03-30 NOTE — PROGRESS NOTES
ANTICOAGULATION MANAGEMENT     Malcolm HENDERSON [facility] 82 year old male is on warfarin with therapeutic INR result. (Goal INR 2.0-3.0)    Recent labs: (last 7 days)     03/30/22  1333   INR 2.5*       ASSESSMENT       Source(s): Chart Review and Patient/Caregiver Call       Warfarin doses taken: Warfarin taken as instructed    Diet: No new diet changes identified    New illness, injury, or hospitalization: No    Medication/supplement changes: None noted    Signs or symptoms of bleeding or clotting: No    Previous INR: Therapeutic last visit; previously outside of goal range    Additional findings: None       PLAN     Recommended plan for no diet, medication or health factor changes affecting INR     Dosing Instructions: continue your current warfarin dose with next INR in 5 weeks       Summary  As of 3/30/2022    Full warfarin instructions:  5 mg every Mon, Wed, Fri; 2.5 mg all other days   Next INR check:  5/4/2022             Telephone call with Jaskaran who agrees to plan and repeated back plan correctly    Lab visit scheduled    Education provided: Contact [phone]  with any changes, questions or concerns.     Plan made per [facility] anticoagulation protocol    Vianca Kent RN  Anticoagulation Clinic  3/30/2022    _______________________________________________________________________     Anticoagulation Episode Summary     Current INR goal:  2.0-3.0   TTR:  56.3 % (1 y)   Target end date:  Indefinite   Send INR reminders to:  JOHNNIE LACEY    Indications    Long term current use of anticoagulant therapy [Z79.01]  Chronic atrial fibrillation (H) [I48.20]           Comments:  5mg tabs // APPT CARD // cell phone [phone]         Anticoagulation Care Providers     Provider Role Specialty Phone number    Tod Callahan MD Referring Internal Medicine - Pediatrics [phone]    Leisa Mistry MD Referring Internal Medicine - Pediatrics [phone]

## 2022-04-07 DIAGNOSIS — I50.9 HEART FAILURE, UNSPECIFIED (H): Primary | ICD-10-CM

## 2022-04-19 ENCOUNTER — DOCUMENTATION ONLY (OUTPATIENT)
Dept: LAB | Facility: CLINIC | Age: 82
End: 2022-04-19
Payer: COMMERCIAL

## 2022-04-19 DIAGNOSIS — Z11.52 ENCOUNTER FOR SCREENING FOR COVID-19: Primary | ICD-10-CM

## 2022-04-19 NOTE — PROGRESS NOTES
Jaskaran has an appointment for a covid test with no lab order. Can we please get an order for his lab. Thank you. Marion

## 2022-04-28 ENCOUNTER — LAB (OUTPATIENT)
Dept: LAB | Facility: CLINIC | Age: 82
End: 2022-04-28
Payer: COMMERCIAL

## 2022-04-28 DIAGNOSIS — Z11.52 ENCOUNTER FOR SCREENING FOR COVID-19: ICD-10-CM

## 2022-04-28 LAB — SARS-COV-2 RNA RESP QL NAA+PROBE: NEGATIVE

## 2022-04-28 PROCEDURE — U0003 INFECTIOUS AGENT DETECTION BY NUCLEIC ACID (DNA OR RNA); SEVERE ACUTE RESPIRATORY SYNDROME CORONAVIRUS 2 (SARS-COV-2) (CORONAVIRUS DISEASE [COVID-19]), AMPLIFIED PROBE TECHNIQUE, MAKING USE OF HIGH THROUGHPUT TECHNOLOGIES AS DESCRIBED BY CMS-2020-01-R: HCPCS

## 2022-04-28 PROCEDURE — U0005 INFEC AGEN DETEC AMPLI PROBE: HCPCS

## 2022-05-04 ENCOUNTER — LAB (OUTPATIENT)
Dept: LAB | Facility: CLINIC | Age: 82
End: 2022-05-04
Payer: COMMERCIAL

## 2022-05-04 ENCOUNTER — TELEPHONE (OUTPATIENT)
Dept: PEDIATRICS | Facility: CLINIC | Age: 82
End: 2022-05-04

## 2022-05-04 ENCOUNTER — ANTICOAGULATION THERAPY VISIT (OUTPATIENT)
Dept: ANTICOAGULATION | Facility: CLINIC | Age: 82
End: 2022-05-04

## 2022-05-04 DIAGNOSIS — I48.20 CHRONIC ATRIAL FIBRILLATION (H): ICD-10-CM

## 2022-05-04 DIAGNOSIS — Z79.01 LONG TERM CURRENT USE OF ANTICOAGULANT THERAPY: ICD-10-CM

## 2022-05-04 DIAGNOSIS — Z79.01 LONG TERM CURRENT USE OF ANTICOAGULANT THERAPY: Primary | ICD-10-CM

## 2022-05-04 LAB — INR BLD: 2.8 (ref 0.9–1.1)

## 2022-05-04 PROCEDURE — 85610 PROTHROMBIN TIME: CPT

## 2022-05-04 PROCEDURE — 36416 COLLJ CAPILLARY BLOOD SPEC: CPT

## 2022-05-04 NOTE — PROGRESS NOTES
ANTICOAGULATION MANAGEMENT     Malcolm HENDERSON [facility] 82 year old male is on warfarin with therapeutic INR result. (Goal INR 2.0-3.0)    Recent labs: (last 7 days)     05/04/22  1334   INR 2.8*       ASSESSMENT       Source(s): Chart Review and Patient/Caregiver Call       Warfarin doses taken: Warfarin taken as instructed    Diet: No new diet changes identified    New illness, injury, or hospitalization: No    Medication/supplement changes: None noted    Signs or symptoms of bleeding or clotting: No    Previous INR: Therapeutic last 2(+) visits    Additional findings: Upcoming surgery/procedure - May be having MitraClip procedure for mitral valve in the future.       PLAN     Recommended plan for no diet, medication or health factor changes affecting INR     Dosing Instructions: continue your current warfarin dose with next INR in 6 weeks       Summary  As of 5/4/2022    Full warfarin instructions:  5 mg every Mon, Wed, Fri; 2.5 mg all other days   Next INR check:  6/15/2022             Telephone call with Jaskaran who agrees to plan and repeated back plan correctly    Lab visit scheduled    Education provided: Importance of notifying clinic of upcoming surgeries and procedures 2 weeks in advance and Contact [phone]  with any changes, questions or concerns.     Plan made per [facility] anticoagulation protocol    Vianca Kent RN  Anticoagulation Clinic  5/4/2022    _______________________________________________________________________     Anticoagulation Episode Summary     Current INR goal:  2.0-3.0   TTR:  60.6 % (1 y)   Target end date:  Indefinite   Send INR reminders to:  [facility]    Indications    Long term current use of anticoagulant therapy [Z79.01]  Chronic atrial fibrillation (H) [I48.20]           Comments:  5mg tabs // APPT CARD // cell phone [phone]         Anticoagulation Care Providers     Provider Role Specialty Phone number    Tod Callahan MD Referring Internal Medicine - Pediatrics  [phone]    Leisa Mistry MD Referring Internal Medicine - Pediatrics [phone]

## 2022-05-04 NOTE — LETTER
[address] [address]  [phone]         May 4, 2022     To: [facility]  [address] [place] [address]  Fax: [phone]            Orders for Malcolm Barker     : 1940     He is being treated with warfarin for atrial fibrillation and ischemic cardiomyopathy and requires his INR checked as needed.       Draw INR every 2 to 6 weeks starting 2022 through 2022.     Fingerstick INR is acceptable.       Diag: Z79.01  Long-term (current) use of anticoagulants,   I48.2 Chronic atrial fibrillation (H),   I25.5 Ischemic cardiomyopathy            Fax result to:      [facility] (Fax: [phone]).  Phone number: [phone]     Please also give result to patient.        Thanks,                 Tod Callahan MD  UPIN# [id number]  [address] [address]  [phone]

## 2022-05-09 ENCOUNTER — MYC MEDICAL ADVICE (OUTPATIENT)
Dept: PEDIATRICS | Facility: CLINIC | Age: 82
End: 2022-05-09
Payer: COMMERCIAL

## 2022-05-09 DIAGNOSIS — Z12.11 SCREEN FOR COLON CANCER: Primary | ICD-10-CM

## 2022-05-09 NOTE — TELEPHONE ENCOUNTER
Signed order for Veronica and Geovanny Callahan.    Kezia Hernandez MD  Internal Medicine & Pediatrics  [facility]

## 2022-05-19 ENCOUNTER — DOCUMENTATION ONLY (OUTPATIENT)
Dept: PEDIATRICS | Facility: CLINIC | Age: 82
End: 2022-05-19
Payer: COMMERCIAL

## 2022-05-19 DIAGNOSIS — Z79.01 LONG TERM CURRENT USE OF ANTICOAGULANT THERAPY: ICD-10-CM

## 2022-05-19 DIAGNOSIS — I48.20 CHRONIC ATRIAL FIBRILLATION (H): Primary | ICD-10-CM

## 2022-05-19 NOTE — PROGRESS NOTES
ANTICOAGULATION MANAGEMENT      Malcolm HENDERSON [facility] due for annual renewal of referral to anticoagulation monitoring. Order pended for your review and signature.      ANTICOAGULATION SUMMARY      Warfarin indication(s)     Atrial fibrillation    Heart valve present?  NO       Current goal range   INR: 2.0-3.0     Goal appropriate for indication? Yes, INR 2-3 appropriate for hx of DVT, PE, hypercoagulable state, Afib, LVAD, or bileaflet AVR without risk factors     Current duration of therapy Indefinite/long term therapy   Time in Therapeutic Range (TTR)  (Goal > 60%) 60.6%       Office visit with referring provider's group within last year yes on 1/21/22       Vianca Kent RN

## 2022-05-25 NOTE — TELEPHONE ENCOUNTER
Spoke to patient by phone.  He is requesting orders to have his INR checked in [place] for the summer.    Orders letter started.  Please review, print, sign, fax to lab (number is on letter), and mail to patient's home address.    Vianca Kent RN  [facility]     Nostril Rim Text: The closure involved the nostril rim.

## 2022-06-07 ENCOUNTER — VIRTUAL VISIT (OUTPATIENT)
Dept: PEDIATRICS | Facility: CLINIC | Age: 82
End: 2022-06-07
Payer: COMMERCIAL

## 2022-06-07 DIAGNOSIS — I50.22 CHRONIC SYSTOLIC CONGESTIVE HEART FAILURE (H): ICD-10-CM

## 2022-06-07 DIAGNOSIS — E78.5 HYPERLIPIDEMIA WITH TARGET LDL LESS THAN 100: ICD-10-CM

## 2022-06-07 DIAGNOSIS — Z95.810 AICD (AUTOMATIC CARDIOVERTER/DEFIBRILLATOR) PRESENT: ICD-10-CM

## 2022-06-07 DIAGNOSIS — I48.20 CHRONIC ATRIAL FIBRILLATION (H): ICD-10-CM

## 2022-06-07 DIAGNOSIS — Z12.11 SCREEN FOR COLON CANCER: ICD-10-CM

## 2022-06-07 DIAGNOSIS — I10 ESSENTIAL HYPERTENSION, BENIGN: ICD-10-CM

## 2022-06-07 DIAGNOSIS — I25.10 CORONARY ARTERY DISEASE INVOLVING NATIVE CORONARY ARTERY OF NATIVE HEART WITHOUT ANGINA PECTORIS: ICD-10-CM

## 2022-06-07 DIAGNOSIS — Z00.00 ROUTINE GENERAL MEDICAL EXAMINATION AT A HEALTH CARE FACILITY: Primary | ICD-10-CM

## 2022-06-07 DIAGNOSIS — N18.31 STAGE 3A CHRONIC KIDNEY DISEASE (H): ICD-10-CM

## 2022-06-07 LAB — EJECTION FRACTION: NORMAL %

## 2022-06-07 PROCEDURE — G0439 PPPS, SUBSEQ VISIT: HCPCS | Mod: 95 | Performed by: INTERNAL MEDICINE

## 2022-06-07 NOTE — PROGRESS NOTES
Jaskaran is a 82 year old who is being evaluated via a billable video visit.      Video Start Time: 10:13 AM    Subjective   Jaskaran is a 82 year old who presents for the following health issues     Visit today is for an annual wellness visit.    HPI  Do you feel safe in your environment? Yes    Have you ever done Advance Care Planning? (For example, a Health Directive, POLST, or a discussion with a medical provider or your loved ones about your wishes): Yes, patient states has an Advance Care Planning document and will bring a copy to the clinic.       Fall risk  Fallen 2 or more times in the past year?: No  Any fall with injury in the past year?: No    Cognitive Screening Unable to complete due to virtual visit; need for additional assessment in future face-to-face visit    Do you have sleep apnea, excessive snoring or daytime drowsiness?: no    Reviewed and updated as needed this visit by clinical staff   Tobacco  Allergies  Meds  Problems  Med Hx  Surg Hx  Fam Hx            Reviewed and updated as needed this visit by Provider   Tobacco  Allergies  Meds  Problems  Med Hx  Surg Hx  Fam Hx           Social History     Tobacco Use     Smoking status: Former Smoker     Years: 3.00     Types: Cigarettes     Quit date: 1999     Years since quittin.2     Smokeless tobacco: Never Used     Tobacco comment: quit approx    Substance Use Topics     Alcohol use: Yes     Comment: 2 drinks daily       Current providers sharing in care for this patient include:   Patient Care Team:  Tod Callahan MD as PCP - General (Internal Medicine)  Tod Callahan MD as Assigned PCP  Wally Sanchez DPM as Assigned Musculoskeletal Provider  Matt Stevens MD as Assigned Neuroscience Provider    The following health maintenance items are reviewed in Epic and correct as of today:  Health Maintenance Due   Topic Date Due     MICROALBUMIN  Never done     ANNUAL REVIEW OF HM ORDERS  Never done     HF  ACTION PLAN  12/06/2016     LIPID  10/23/2021         Weight and BP have been stable.  200# weight  105/70 with last home BP, HR 55. These readings are typical for him.    Has hx of MI in May 2013.   Being followed by cardiology, looking at a valve (he believes is mitral). Potentially will clip the valve.   He has a visit at [facility] this afternoon for an echocardiogram.   LVEF just over 1 year ago 27%.    Noting SOB eric w exertion and loss of stamina.     Neuropathy symptoms. Is migrating up his legs, now nearly to the knees.  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 Is starting to affect fine motor skills.  Has been evaluated through neurology.   Treating with gabapentin, 2400 mg total per day.    Acetaminophen does help. Takes prn currently. Discussed scheduling doses at least evening.    Glaucoma. Managed by ophthalmology. 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     Hx of loose bowel movements. Increased fiber and sx have resolved.     Left lower lateral ribs have been painful. Sx ongoing for the past few weeks. Sx are slowly improving. Unclear if this is bruised ribs. Was lying on his dock, tying a rope onto the dock prior to onset.         Objective           Vitals:  No vitals were obtained today due to virtual visit.    GEN: No distress  SKIN: No visible rashes  E: No eye drainage or icterus  ENT: No nasal discharge noted  NECK: Supple  LUNGS: No active cough, wheezing.   PSYCH: Normal affect. Well groomed.   NEURO: No focal deficits appreciated         ASSESSMENT / PLAN:       ICD-10-CM    1. Routine general medical examination at a health care facility  Z00.00    2. Screen for colon cancer  Z12.11 COURTNEY([employer])   3. Chronic atrial fibrillation (H)  I48.20    4. Chronic systolic congestive heart failure (H)  I50.22    5. Essential hypertension, benign  I10    6. Coronary artery disease involving native coronary artery of native heart without angina pectoris  I25.10    7. AICD (automatic  "cardioverter/defibrillator) present  Z95.810    8. Hyperlipidemia with target LDL less than 100  E78.5    9. Stage 3a chronic kidney disease (H)  N18.31       No medication changes made today.  He has not received the Cologuard ordered last month. New order signed.         COUNSELING:  Reviewed preventive health counseling, as reflected in patient instructions    Estimated body mass index is 29.36 kg/m  as calculated from the following:    Height as of 4/8/21: 1.822 m (5' 11.75\").    Weight as of 1/21/22: 97.5 kg (215 lb).        He reports that he quit smoking about 23 years ago. His smoking use included cigarettes. He quit after 3.00 years of use. He has never used smokeless tobacco.      Appropriate preventive services were discussed with this patient, including applicable screening as appropriate for cardiovascular disease, diabetes, osteopenia/osteoporosis, and glaucoma.  As appropriate for age/gender, discussed screening for colorectal cancer, prostate cancer, breast cancer, and cervical cancer. Checklist reviewing preventive services available has been given to the patient.    Reviewed patients plan of care and provided an AVS. The Basic Care Plan (routine screening as documented in Health Maintenance) for Malcolm meets the Care Plan requirement. This Care Plan has been established and reviewed with the Patient.    Counseling Resources:  ATP IV Guidelines  Pooled Cohorts Equation Calculator  Breast Cancer Risk Calculator  Breast Cancer: Medication to Reduce Risk  FRAX Risk Assessment  ICSI Preventive Guidelines  Dietary Guidelines for Americans, 2010  USDA's MyPlate  ASA Prophylaxis  Lung CA Screening      Video-Visit Details    Type of service: Video Visit    Video End Time:10:43 AM    Originating Location (pt. Location): Home    Distant Location (provider location):  [facility]     Platform used for Video Visit: [place]"

## 2022-06-07 NOTE — LETTER
2022    To: [facility]  [address] [place] [address]  Fax: [phone]         Orders for Malcolm Barker     : 1940     He is being treated with warfarin for atrial fibrillation and ischemic cardiomyopathy and requires his INR checked as needed.       Draw INR every 2 to 6 weeks starting 2022 through 2022.     Fingerstick INR is acceptable.       Diag: Z79.01  Long-term (current) use of anticoagulants   I48.2 Chronic atrial fibrillation (H)   I25.5 Ischemic cardiomyopathy      Fax result to:      [facility] (Fax: [phone]).  Phone number: [phone]     Please also give result to patient.          Thanks,            Tod Callahan MD  UPIN# [id number]  [facility]  [address] [facility] [address] [phone]

## 2022-06-15 ENCOUNTER — ANTICOAGULATION THERAPY VISIT (OUTPATIENT)
Dept: PEDIATRICS | Facility: CLINIC | Age: 82
End: 2022-06-15
Payer: COMMERCIAL

## 2022-06-15 ENCOUNTER — TELEPHONE (OUTPATIENT)
Dept: PHARMACY | Facility: CLINIC | Age: 82
End: 2022-06-15
Payer: COMMERCIAL

## 2022-06-15 DIAGNOSIS — Z79.01 LONG TERM CURRENT USE OF ANTICOAGULANT THERAPY: Primary | ICD-10-CM

## 2022-06-15 DIAGNOSIS — I48.20 CHRONIC ATRIAL FIBRILLATION (H): ICD-10-CM

## 2022-06-15 LAB — INR (EXTERNAL): 2.7 (ref 0.9–1.1)

## 2022-06-15 NOTE — PROGRESS NOTES
Received a fax INR result for Malcolm from [facility]    INR result dated 6/15/2022 is 2.7    Nikki MOROCHO RN, BSN  Anticoagulation Clinic

## 2022-06-15 NOTE — TELEPHONE ENCOUNTER
Patient was identified per [email or web address] insurance company as a good MTM candidate.  Spoke with patient and he is not interested in this service at this time.    Mandi Payne, PharmD [facility]  Medication Therapy Management Practitioner   #[phone]

## 2022-06-15 NOTE — PROGRESS NOTES
ANTICOAGULATION MANAGEMENT     [facility] 82 [facility] is on warfarin with therapeutic INR result. (Goal INR 2.0-3.0)    Recent labs: (last 7 days)     06/15/22  1502   INR 2.7*       ASSESSMENT       Source(s): Chart Review and Patient/Caregiver Call       Warfarin doses taken: Warfarin taken as instructed    Diet: No new diet changes identified    New illness, injury, or hospitalization: No    Medication/supplement changes: None noted    Signs or symptoms of bleeding or clotting: No    Previous INR: Therapeutic last 2(+) visits    Additional findings: Patient is in [place] for the summer.  Will have next INR drawn at the [place] lab again.       PLAN     Recommended plan for no diet, medication or health factor changes affecting INR     Dosing Instructions: continue your current warfarin dose with next INR in 8 weeks       Summary  As of 6/15/2022    Full warfarin instructions:  5 mg every Mon, Wed, Fri; 2.5 mg all other days   Next INR check:  7/27/2022             Telephone call with Jaskaran who verbalizes understanding and agrees to plan    Patient is using an outside lab and will follow up with them for next lab appointment.    Education provided: Please call back if any changes to your diet, medications or how you've been taking warfarin and Contact [phone]  with any changes, questions or concerns.     Plan made per [facility] anticoagulation protocol    Mandi Phoenix RN  Anticoagulation Clinic  6/15/2022    _______________________________________________________________________     Anticoagulation Episode Summary     Current INR goal:  2.0-3.0   TTR:  64.5 % (1 y)   Target end date:  Indefinite   Send INR reminders to:  [facility]    Indications    Long term current use of anticoagulant therapy [Z79.01]  Chronic atrial fibrillation (H) [I48.20]           Comments:  5mg tabs // APPT CARD // cell phone [phone]         Anticoagulation Care Providers     Provider Role Specialty Phone number     Tod Callahan MD Referring Internal Medicine - Pediatrics [phone]    Leisa Mistry MD Referring Internal Medicine - Pediatrics [phone]

## 2022-06-25 ENCOUNTER — MYC MEDICAL ADVICE (OUTPATIENT)
Dept: NEUROLOGY | Facility: CLINIC | Age: 82
End: 2022-06-25

## 2022-06-26 NOTE — LETTER
[facility]  [address], [address]  [place]  [address]  [phone]        2017      To whom it may concern:    Regarding: Orders for Malcolm Barker     : 1940    He is being treated with warfarin for atrial fibrillation and ischemic cardiomyopathy and needs his INR checked monthly, or as needed if his INR is not in the desired range.      Draw INR every 2 to 4 weeks starting May 2017 through 2017.     Fingerstick INR is acceptable.       Diag: Z79.01  Long-term (current) use of anticoagulants, 148.2 Chronic atrial fibrillation (H)         Fax result to:   [facility] or [facility] Anderson  [facility] (Fax: [phone]).  Phone number: [phone]    Please also give result to patient.          Thanks          David Jalloh M.D.  
3

## 2022-07-10 LAB — NONINV COLON CA DNA+OCC BLD SCRN STL QL: NEGATIVE

## 2022-07-13 ENCOUNTER — TRANSFERRED RECORDS (OUTPATIENT)
Dept: HEALTH INFORMATION MANAGEMENT | Facility: CLINIC | Age: 82
End: 2022-07-13

## 2022-07-13 LAB
CREATININE (EXTERNAL): 1.54 MG/DL (ref 0.72–1.25)
GFR ESTIMATED (EXTERNAL): 45 ML/MIN/1.73M2
GLUCOSE (EXTERNAL): 78 MG/DL (ref 65–100)
POTASSIUM (EXTERNAL): 4 MMOL/L (ref 3.5–5)

## 2022-07-15 ENCOUNTER — TRANSFERRED RECORDS (OUTPATIENT)
Dept: PEDIATRICS | Facility: CLINIC | Age: 82
End: 2022-07-15

## 2022-08-01 LAB — INR (EXTERNAL): 2.2 (ref 0.9–1.1)

## 2022-08-03 ENCOUNTER — TELEPHONE (OUTPATIENT)
Dept: PEDIATRICS | Facility: CLINIC | Age: 82
End: 2022-08-03

## 2022-08-03 DIAGNOSIS — Z79.01 LONG TERM CURRENT USE OF ANTICOAGULANT THERAPY: Primary | ICD-10-CM

## 2022-08-03 DIAGNOSIS — I48.20 CHRONIC ATRIAL FIBRILLATION (H): ICD-10-CM

## 2022-08-03 NOTE — TELEPHONE ENCOUNTER
Called patient, he reports he had his INR drawn 8/1/22 between 9:30 AM and 10:00 AM  Attempted to call [facility], no answer, will try again 8/4/22    [facility] [phone]    ANTICOAGULATION MANAGEMENT     Malcolm HENDERSON [facility] 82 year old male is on warfarin with therapeutic INR result. (Goal INR )    No results for input(s): INR in the last 168 hours.    ASSESSMENT       Source(s): Chart Review and Patient/Caregiver Call       Warfarin doses taken: Warfarin taken as instructed    Diet: No new diet changes identified    New illness, injury, or hospitalization: Yes: Patient reports he had a mitral valve clip 7/15/22, held his warfarin for 3 days prior and the day of the procedure, did do Lovenox injections    Medication/supplement changes: Duloxatine started on 7/18/22 Concurrent use of DULOXETINE and WARFARIN may result in altered anticoagulation effects including increased risk of bleeding. Patient reports currently taking 1 tablet/day, will be going to 2 tablets/day    Signs or symptoms of bleeding or clotting: Yes: bruising from surgical procedure 7/15/22, patient states this is resolving    Previous INR: Therapeutic last 2(+) visits    Additional findings: None       PLAN     Recommended plan for ongoing change(s) affecting INR     Dosing Instructions: Continue your current warfarin dose with next INR in 4 weeks       Summary  As of 8/3/2022    Full warfarin instructions:  5 mg every Mon, Wed, Fri; 2.5 mg all other days   Next INR check:  8/31/2022             Telephone call with Jaskaran who verbalizes understanding and agrees to plan    Patient using outside facility for labs    Education provided: Please call back if any changes to your diet, medications or how you've been taking warfarin and Contact [phone]  with any changes, questions or concerns.     Plan made per [facility] anticoagulation protocol    Deb Reddy RN  Anticoagulation  Clinic  8/3/2022    _______________________________________________________________________     Anticoagulation Episode Summary     Current INR goal:  2.0-3.0   TTR:  66.1 % (10.5 mo)   Target end date:  Indefinite   Send INR reminders to:  [facility]    Indications    Long term current use of anticoagulant therapy [Z79.01]  Chronic atrial fibrillation (H) [I48.20]           Comments:  5mg tabs // APPT CARD // cell phone [phone]         Anticoagulation Care Providers     Provider Role Specialty Phone number    Tod Callahan MD Referring Internal Medicine - Pediatrics [phone]    Leisa Mistry MD Referring Internal Medicine - Pediatrics [phone]

## 2022-08-03 NOTE — TELEPHONE ENCOUNTER
Reason for Call:  INR    Who is calling?  Patient    Phone number:  [phone]    Fax number:  N/A    Name of caller: Jaskaran Barker    INR Value:  2.2    Are there any other concerns:  Yes: PT went to [facility] but seems like report sometimes and sometimes not.  PT stated believes next INR in 6 weeks    Can we leave a detailed message on this number? YES    Phone number patient can be reached at: Home number on file [phone] (home)      Call taken on 8/3/2022 at 3:55 PM by Georgiana Caceres

## 2022-08-04 NOTE — TELEPHONE ENCOUNTER
Called [facility], confirmed INR result of 2.2, they will fax the result again.  Deb Reddy RN, BSN  Anticoagulation Clinic

## 2022-08-04 NOTE — TELEPHONE ENCOUNTER
Received a fax INR result for Malcolm from [facility]    INR result dated 8/1/2022 @ 0918 is 2.2    Nikki MOROCHO RN, BSN  Anticoagulation Clinic

## 2022-08-29 ENCOUNTER — TELEPHONE (OUTPATIENT)
Dept: PEDIATRICS | Facility: CLINIC | Age: 82
End: 2022-08-29

## 2022-08-29 ENCOUNTER — ANTICOAGULATION THERAPY VISIT (OUTPATIENT)
Dept: ANTICOAGULATION | Facility: CLINIC | Age: 82
End: 2022-08-29

## 2022-08-29 DIAGNOSIS — I48.20 CHRONIC ATRIAL FIBRILLATION (H): ICD-10-CM

## 2022-08-29 DIAGNOSIS — Z79.01 LONG TERM CURRENT USE OF ANTICOAGULANT THERAPY: Primary | ICD-10-CM

## 2022-08-29 LAB — INR (EXTERNAL): 3.1 (ref 0.9–1.1)

## 2022-08-29 NOTE — PROGRESS NOTES
ANTICOAGULATION MANAGEMENT     Malcolm HENDERSON [facility] 82 year old male is on warfarin with supratherapeutic INR result. (Goal INR 2.0-3.0)    Recent labs: (last 7 days)     08/29/22  0000   INR 3.1*       ASSESSMENT       Source(s): Chart Review and Patient/Caregiver Call       Warfarin doses taken: Warfarin taken as instructed    Diet: No new diet changes identified    New illness, injury, or hospitalization: No    Medication/supplement changes: Duloxetine started on about a month ago which may be increasing INR today    Signs or symptoms of bleeding or clotting: No    Previous INR: Therapeutic last 2(+) visits    Additional findings: Due to stability on current dosing, elected to keep dose the same. Possible that duloxetine is reason for slight high today. Pt aware if INR supratherapeutic in 2-3 weeks, will reduce dose.        PLAN     Recommended plan for ongoing change(s) affecting INR     Dosing Instructions: Continue your current warfarin dose with next INR in 2-3 weeks       Summary  As of 8/29/2022    Full warfarin instructions:  5 mg every Mon, Wed, Fri; 2.5 mg all other days   Next INR check:  9/19/2022             Telephone call with Jaskaran who agrees to plan and repeated back plan correctly    Patient using outside facility for labs - will still be using lab in [place]     Education provided: Goal range and significance of current result, Importance of following up at instructed interval and Potential interaction between warfarin and duloxetine    Plan made per [facility] anticoagulation protocol    Beba Rendon RN  Anticoagulation Clinic  8/29/2022    _______________________________________________________________________     Anticoagulation Episode Summary     Current INR goal:  2.0-3.0   TTR:  69.7 % (1 y)   Target end date:  Indefinite   Send INR reminders to:  JOHNNIE LACEY    Indications    Long term current use of anticoagulant therapy [Z79.01]  Chronic atrial fibrillation (H) [I48.20]            Comments:  5mg tabs // APPT CARD // cell phone [phone]         Anticoagulation Care Providers     Provider Role Specialty Phone number    Tod Callahan MD Referring Internal Medicine - Pediatrics [phone]    Leisa Mistry MD Referring Internal Medicine - Pediatrics [phone]

## 2022-08-29 NOTE — TELEPHONE ENCOUNTER
Voicemail from [facility] stating he had his INR checked at [facility] in [place] today, results were 3.1. Patient is requesting a call back.

## 2022-09-01 DIAGNOSIS — G60.3 IDIOPATHIC PROGRESSIVE NEUROPATHY: ICD-10-CM

## 2022-09-01 RX ORDER — GABAPENTIN 300 MG/1
600 CAPSULE ORAL 4 TIMES DAILY
Qty: 720 CAPSULE | Refills: 3 | Status: SHIPPED | OUTPATIENT
Start: 2022-09-01 | End: 2022-09-02

## 2022-09-01 NOTE — TELEPHONE ENCOUNTER
Patient went to visit son for the month, in [place] and let his Gabapentin at home. Patient is requesting a one time refill of Qty 300 sent to the [facility] pharmacy.     Manisha Thacker on 9/1/2022 at 11:20 AM

## 2022-09-02 DIAGNOSIS — G60.3 IDIOPATHIC PROGRESSIVE NEUROPATHY: ICD-10-CM

## 2022-09-02 RX ORDER — GABAPENTIN 300 MG/1
600 CAPSULE ORAL 4 TIMES DAILY
Qty: 720 CAPSULE | Refills: 3 | Status: SHIPPED | OUTPATIENT
Start: 2022-09-02 | End: 2023-01-01

## 2022-09-15 NOTE — TELEPHONE ENCOUNTER
Reason for Call:  INR    Who is calling?  Patient     Phone number:[phone]    Fax number:      Name of caller: Malcolm Barker     INR Value:      Are there any other concerns:  Yes: Returning call     Can we leave a detailed message on this number? YES    Phone number patient can be reached at: Home number on file [phone] (home)      Call taken on 9/15/2022 at 12:50 PM by Tasia Daniel

## 2022-09-15 NOTE — PROGRESS NOTES
ANTICOAGULATION MANAGEMENT     Malcolm HENDERSON [facility] 82 year old male is on warfarin with supratherapeutic INR result. (Goal INR 2.0-3.0)    Recent labs: (last 7 days)     09/15/22  0949   INR 3.9*       ASSESSMENT       Source(s): Chart Review    Previous INR was Supratherapeutic    Medication, diet, health changes since last INR     2nd supra INR most likely due to the addition of Duloxetine, plan is to HOLD warfarin today and reduce warfarin maintenance dose 10%--see calendar.    Concurrent use of DULOXETINE and WARFARIN may result in altered anticoagulation effects including increased risk of bleeding.           PLAN     Unable to reach Jaskaran today.    Left message to hold warfarin tonight. Request call back for assessment.    Follow up required to confirm warfarin dose taken and assess for changes    Kaylah Sherman RN  Anticoagulation Clinic  9/15/2022

## 2022-09-15 NOTE — PROGRESS NOTES
"ANTICOAGULATION MANAGEMENT     Malcolm HENDERSON [facility] 82 year old male is on warfarin with supratherapeutic INR result. (Goal INR 2.0-3.0)    Recent labs: (last 7 days)     09/15/22  0949   INR 3.9*       ASSESSMENT       Source(s): Chart Review and Patient/Caregiver Call       Warfarin doses taken: Warfarin taken as instructed    Diet: No new diet changes identified--patient reports that he is \"drinking the same\".    New illness, injury, or hospitalization: No    Medication/supplement changes: yes, patient has been taking Duloxetine since 06/2022 for neuropathy but has decided to discontinue it as he doesn't feel it has helped, he also stated that he plans to continue gabapentin.    Signs or symptoms of bleeding or clotting: No    Previous INR: Supratherapeutic    Additional findings: I did not decrease warfarin maintenance dose today since patient will not be taking any more duloxetine effective today.       PLAN     Recommended plan for ongoing change(s) affecting INR     Dosing Instructions: hold dose then continue your current warfarin dose with next INR in 2 weeks       Summary  As of 9/15/2022    Full warfarin instructions:  9/15: Hold; Otherwise 5 mg every Mon, Wed, Fri; 2.5 mg all other days   Next INR check:  9/29/2022             Telephone call with Jaskaran who verbalizes understanding and agrees to plan    Patient using outside facility for labs    Education provided: Potential interaction between warfarin and duloxetine    Plan made per [facility] anticoagulation protocol    Kaylah Sherman RN  Anticoagulation Clinic  9/15/2022    _______________________________________________________________________     Anticoagulation Episode Summary     Current INR goal:  2.0-3.0   TTR:  65.0 % (1 y)   Target end date:  Indefinite   Send INR reminders to:  JOHNNIE DEEPTHI    Indications    Long term current use of anticoagulant therapy [Z79.01]  Chronic atrial fibrillation (H) [I48.20]           Comments:  5mg tabs // APPT CARD " // cell phone [phone]         Anticoagulation Care Providers     Provider Role Specialty Phone number    Tod Callahan MD Referring Internal Medicine - Pediatrics [phone]    Leisa Mistry MD Referring Internal Medicine - Pediatrics [phone]

## 2022-09-15 NOTE — TELEPHONE ENCOUNTER
ANTICOAGULATION MANAGEMENT:  Medication Refill    Anticoagulation Summary  As of 9/15/2022    Warfarin maintenance plan:  5 mg (5 mg x 1) every Mon, Wed, Fri; 2.5 mg (5 mg x 0.5) all other days   Next INR check:  9/29/2022   Target end date:  Indefinite    Indications    Long term current use of anticoagulant therapy [Z79.01]  Chronic atrial fibrillation (H) [I48.20]             Anticoagulation Care Providers     Provider Role Specialty Phone number    Tod Callahan MD Referring Internal Medicine - Pediatrics [phone]    Leisa Mistry MD Referring Internal Medicine - Pediatrics [phone]          Visit with referring provider/group within last year: Yes    [facility] referral signed within last year: Yes    Malcolm meets all criteria for refill (current [facility] referral, office visit with referring provider/group in last year, lab monitoring up to date or not exceeding 2 weeks overdue). Rx instructions and quantity match patient's current dosing plan. 90 day supply with 0 refills granted per [facility] protocol     Patient wanted 1 time 90 day supply [place]--[place]. Future refills will go back to [facility] in [place], [place].    Kaylah Sherman RN  Anticoagulation Clinic

## 2022-09-28 NOTE — PROGRESS NOTES
ANTICOAGULATION MANAGEMENT     Malcolm HENDERSON [facility] 82 year old male is on warfarin with therapeutic INR result. (Goal INR 2.0-3.0)    Recent labs: (last 7 days)     09/28/22  1113   INR 3.0*       ASSESSMENT       Source(s): Chart Review and Patient/Caregiver Call       Warfarin doses taken: Warfarin taken as instructed    Diet: Change in alcohol intake may be affecting INR. More alcohol last night than usual, plans to stick to regular intake going forward.     New illness, injury, or hospitalization: No    Medication/supplement changes: None noted    Signs or symptoms of bleeding or clotting: No    Previous INR: Supratherapeutic    Additional findings: None       PLAN     Recommended plan for temporary change(s) affecting INR     Dosing Instructions: Continue your current warfarin dose with next INR in 3 weeks       Summary  As of 9/28/2022    Full warfarin instructions:  5 mg every Mon, Wed, Fri; 2.5 mg all other days   Next INR check:  10/17/2022             Telephone call with Jaskaran who verbalizes understanding and agrees to plan    Lab visit scheduled, he plans to be back in [place] mid October    Education provided: Please call back if any changes to your diet, medications or how you've been taking warfarin, Importance of consistent vitamin K intake, Potential interaction between warfarin and alcohol and Contact [phone]  with any changes, questions or concerns.     Plan made per [facility] anticoagulation protocol    Katherin Carreno RN  Anticoagulation Clinic  9/28/2022    _______________________________________________________________________     Anticoagulation Episode Summary     Current INR goal:  2.0-3.0   TTR:  62.7 % (1 y)   Target end date:  Indefinite   Send INR reminders to:  JOHNNIE LACEY    Indications    Long term current use of anticoagulant therapy [Z79.01]  Chronic atrial fibrillation (H) [I48.20]           Comments:  5mg tabs // APPT CARD // cell phone [phone]         Anticoagulation  Care Providers     Provider Role Specialty Phone number    Tod Callahan MD Referring Internal Medicine - Pediatrics [phone]    Leisa Mistry MD Referring Internal Medicine - Pediatrics [phone]

## 2022-09-28 NOTE — TELEPHONE ENCOUNTER
Reason for Call:  Other anticoagulation    Detailed comments: Pt calling to report INR results. Pt notes it was at 3.0, would like a call back from INR nurse    Phone Number Patient can be reached at: Cell number on file:    Telephone Information:   Mobile [phone]       Best Time: Around 3 or 4 PM    Can we leave a detailed message on this number? YES    Call taken on 9/28/2022 at 11:46 AM by Yamile Garrett

## 2022-10-17 NOTE — PROGRESS NOTES
ANTICOAGULATION MANAGEMENT     Malcolm HENDERSON [facility] 82 year [facility] male is on warfarin with supratherapeutic INR result. (Goal INR 2.0-3.0)    Recent labs: (last 7 days)     10/17/22  1314   INR 3.9*       ASSESSMENT       Source(s): Chart Review and Patient/Caregiver Call       Warfarin doses taken: Warfarin taken as instructed    Diet: Higher alcohol intake than usual over the weekend. Also less leafy green vegetable intake.    New illness, injury, or hospitalization: No    Medication/supplement changes: None noted    Signs or symptoms of bleeding or clotting: No    Previous INR: Therapeutic last visit; previously outside of goal range    Additional findings: Did not reduce Jaskaran's maintenance dose today due to temporary factor. Will assess for reduction at next INR check.       PLAN     Recommended plan for temporary change(s) affecting INR     Dosing Instructions: hold dose then continue your current warfarin dose with next INR in 2 weeks (slightly earlier, leaving for [place] 10/31)       Summary  As of 10/17/2022    Full warfarin instructions:  10/17: Hold; Otherwise 5 mg every Mon, Wed, Fri; 2.5 mg all other days   Next INR check:  10/28/2022             Telephone call with Jaskaran who verbalizes understanding and agrees to plan    Lab visit scheduled    Education provided: Please call back if any changes to your diet, medications or how you've been taking warfarin, Impact of vitamin K foods on INR, Potential interaction between warfarin and alcohol, Goal range and significance of current result, Importance of following up at instructed interval, Monitoring for bleeding signs and symptoms and Monitoring for clotting signs and symptoms    Plan made per [facility] anticoagulation protocol    Cici Reddy RN  Anticoagulation Clinic  10/17/2022    _______________________________________________________________________     Anticoagulation Episode Summary     Current INR goal:  2.0-3.0   TTR:  62.7 % (1 y)    Target end date:  Indefinite   Send INR reminders to:  [facility]    Indications    Long term current use of anticoagulant therapy [Z79.01]  Chronic atrial fibrillation (H) [I48.20]           Comments:  5mg tabs // APPT CARD // cell phone [phone]         Anticoagulation Care Providers     Provider Role Specialty Phone number    Tod Callahan MD Referring Internal Medicine - Pediatrics [phone]    Leisa Mistry MD Referring Internal Medicine - Pediatrics [phone]

## 2022-11-09 NOTE — TELEPHONE ENCOUNTER
ANTICOAGULATION MANAGEMENT     Malcolm HENDERSON [facility] 82 year old male is on warfarin with therapeutic INR result. (Goal INR )    Recent labs: (last 7 days)     11/09/22  1106   INR 2.9*       ASSESSMENT       Source(s): Chart Review and Patient/Caregiver Call       Warfarin doses taken: Warfarin taken as instructed    Diet: No new diet changes identified    New illness, injury, or hospitalization: No    Medication/supplement changes: None noted    Signs or symptoms of bleeding or clotting: No    Previous INR: Supratherapeutic    Additional findings: None       PLAN     Recommended plan for no diet, medication or health factor changes affecting INR     Dosing Instructions: Continue your current warfarin dose with next INR in 4 weeks       Summary  As of 11/9/2022    Full warfarin instructions:  5 mg every Mon, Wed, Fri; 2.5 mg all other days; Starting 11/9/2022   Next INR check:  12/7/2022             Telephone call with Jaskaran who agrees to plan and repeated back plan correctly    Lab visit scheduled    Education provided:     Please call back if any changes to your diet, medications or how you've been taking warfarin    Plan made per [facility] anticoagulation protocol    Mandi Phoenix RN  Anticoagulation Clinic  11/9/2022    _______________________________________________________________________     Anticoagulation Episode Summary     Current INR goal:  2.0-3.0   TTR:  63.3 % (1 y)   Target end date:  Indefinite   Send INR reminders to:  JOHNNIE LACEY    Indications    Long term current use of anticoagulant therapy [Z79.01]  Chronic atrial fibrillation (H) [I48.20]           Comments:  5mg tabs // APPT CARD // cell phone [phone]         Anticoagulation Care Providers     Provider Role Specialty Phone number    Tod Callahan MD Referring Internal Medicine - Pediatrics [phone]    Leisa Mistry MD Referring Internal Medicine - Pediatrics [phone]

## 2022-11-09 NOTE — TELEPHONE ENCOUNTER
Reason for Call:  Other call back    Detailed comments: Pt took a finger stick INR this morning and the reading was 2.9, pt just wanted to report reading said a nurse can call him back if necessary.     Phone Number Patient can be reached at: Cell number on file:    Telephone Information:   Mobile [phone]       Best Time: anytime    Can we leave a detailed message on this number? YES    Call taken on 11/9/2022 at 11:15 AM by Mary Irving

## 2022-12-12 NOTE — TELEPHONE ENCOUNTER
RN calling in regards to printing INR orders for pt and placing at . Printed them off and placed firt floor .

## 2022-12-12 NOTE — PROGRESS NOTES
ANTICOAGULATION MANAGEMENT     Malcolm HENDERSON [facility] 82 year old male is on warfarin with subtherapeutic INR result. (Goal INR 2.0-3.0)    Recent labs: (last 7 days)     12/12/22  1339   INR 1.5*       ASSESSMENT       Source(s): Chart Review and Patient/Caregiver Call       Warfarin doses taken: Warfarin taken as instructed    Diet: No new diet changes identified    New illness, injury, or hospitalization: No    Medication/supplement changes: None noted    Signs or symptoms of bleeding or clotting: No    Previous INR: Therapeutic last 2(+) visits    Additional findings: Pt is leaving sun/mon traveling (driving for 3 weeks) INR orders sent to the [place] [facility] for pt to  Friday to have with him to have INR done during his travel.        PLAN     Recommended plan for no diet, medication or health factor changes affecting INR     Dosing Instructions: booster dose then continue your current warfarin dose with next INR in 4 days       Summary  As of 12/12/2022    Full warfarin instructions:  12/12: 10 mg; Otherwise 5 mg every Mon, Wed, Fri; 2.5 mg all other days; Starting 12/12/2022   Next INR check:  12/16/2022             Telephone call with Jaskaran who verbalizes understanding and agrees to plan and who agrees to plan and repeated back plan correctly    Lab visit scheduled    Education provided:     Symptom monitoring: monitoring for clotting signs and symptoms, monitoring for stroke signs and symptoms, when to seek medical attention/emergency care and travel related clotting risk and prevention    Plan made per [facility] anticoagulation protocol    Oxana Cintron RN  Anticoagulation Clinic  12/12/2022    _______________________________________________________________________     Anticoagulation Episode Summary     Current INR goal:  2.0-3.0   TTR:  69.1 % (1 y)   Target end date:  Indefinite   Send INR reminders to:  JOHNNIE LACEY    Indications    Long term current use of anticoagulant therapy [Z79.01]  Chronic atrial  fibrillation (H) [I48.20]           Comments:           Anticoagulation Care Providers     Provider Role Specialty Phone number    Tod Callahan MD Referring Internal Medicine - Pediatrics [phone]    Leisa Mistry MD Referring Internal Medicine - Pediatrics [phone]

## 2022-12-13 NOTE — TELEPHONE ENCOUNTER
Reason for Call:  Other call back    Detailed comments: Patient wanted to leave a mssg for Oxana HENDERSON(reid) re: discussion he had with her yesterday 12/12. His INR had gone off the charts and pt now thinks he knows the probable reason, would like call back.    Phone Number Patient can be reached at: Cell number on file:    Telephone Information:   Mobile [phone]       Best Time: Earliest convenience    Can we leave a detailed message on this number? NO    Call taken on 12/13/2022 at 2:31 PM by Laura Kanavel

## 2022-12-13 NOTE — TELEPHONE ENCOUNTER
Called and spoke with patient.  He states that he took sildenafil this week once and questioned if that may have interacted with the warfarin causing the INR to drop.  Per Up to Date, Sildenafil does not interact with warfarin so this should not have had an effect.

## 2022-12-16 NOTE — PROGRESS NOTES
ANTICOAGULATION MANAGEMENT     Malcolm HENDERSON [facility] 82 year old male is on warfarin with therapeutic INR result. (Goal INR 2.0-3.0)    Recent labs: (last 7 days)     12/16/22  1555   INR 2.1*       ASSESSMENT       Source(s): Chart Review and Patient/Caregiver Call       Warfarin doses taken: Warfarin taken as instructed    Diet: No new diet changes identified    New illness, injury, or hospitalization: No    Medication/supplement changes: None noted    Signs or symptoms of bleeding or clotting: No    Previous INR: Therapeutic last 2(+) visits    Additional findings: Jaskaran is leaving town on Monday and will be away for 4 weeks.    Jaskaran picked up orders at the lab today, but states he does not intend to use them as he reports that the previous 1.5 reading seemed falsely elevated. Encouraged to utilize orders to have INR checked while away.       PLAN     Recommended plan for no diet, medication or health factor changes affecting INR     Dosing Instructions: Continue your current warfarin dose with next INR in 2 weeks, Jaskaran declined and elected to schedule in 4 weeks as he will be traveling.     Summary  As of 12/16/2022    Full warfarin instructions:  5 mg every Mon, Wed, Fri; 2.5 mg all other days; Starting 12/16/2022   Next INR check:  1/16/2023             Telephone call with Jaskaran who verbalizes understanding and agrees to plan    Lab visit scheduled    Education provided:     Please call back if any changes to your diet, medications or how you've been taking warfarin    Goal range and lab monitoring: Importance of following up at instructed interval    Interaction IS NOT anticipated between warfarin and sildenafil    Travel tips for clot prevention while driving    Plan made per [facility] anticoagulation protocol    Cici Reddy RN  Anticoagulation Clinic  12/16/2022    _______________________________________________________________________     Anticoagulation Episode Summary     Current INR goal:  2.0-3.0    TTR:  69.3 % (1 y)   Target end date:  Indefinite   Send INR reminders to:  [facility]    Indications    Long term current use of anticoagulant therapy [Z79.01]  Chronic atrial fibrillation (H) [I48.20]           Comments:           Anticoagulation Care Providers     Provider Role Specialty Phone number    Tod Callahan MD Referring Internal Medicine - Pediatrics [phone]    Leisa Mistry MD Referring Internal Medicine - Pediatrics [phone]

## 2023-01-01 ENCOUNTER — OFFICE VISIT (OUTPATIENT)
Dept: PEDIATRICS | Facility: CLINIC | Age: 83
End: 2023-01-01
Payer: COMMERCIAL

## 2023-01-01 ENCOUNTER — NURSE TRIAGE (OUTPATIENT)
Dept: NURSING | Facility: CLINIC | Age: 83
End: 2023-01-01

## 2023-01-01 ENCOUNTER — LAB (OUTPATIENT)
Dept: LAB | Facility: CLINIC | Age: 83
End: 2023-01-01
Payer: COMMERCIAL

## 2023-01-01 ENCOUNTER — TELEPHONE (OUTPATIENT)
Dept: PEDIATRICS | Facility: CLINIC | Age: 83
End: 2023-01-01
Payer: COMMERCIAL

## 2023-01-01 ENCOUNTER — TELEPHONE (OUTPATIENT)
Dept: ANTICOAGULATION | Facility: CLINIC | Age: 83
End: 2023-01-01
Payer: COMMERCIAL

## 2023-01-01 ENCOUNTER — OFFICE VISIT (OUTPATIENT)
Dept: PODIATRY | Facility: CLINIC | Age: 83
End: 2023-01-01
Payer: COMMERCIAL

## 2023-01-01 ENCOUNTER — OFFICE VISIT (OUTPATIENT)
Dept: URGENT CARE | Facility: URGENT CARE | Age: 83
End: 2023-01-01
Payer: COMMERCIAL

## 2023-01-01 ENCOUNTER — TRANSITIONAL CARE UNIT VISIT (OUTPATIENT)
Dept: GERIATRICS | Facility: CLINIC | Age: 83
End: 2023-01-01
Payer: COMMERCIAL

## 2023-01-01 ENCOUNTER — ANTICOAGULATION THERAPY VISIT (OUTPATIENT)
Dept: ANTICOAGULATION | Facility: CLINIC | Age: 83
End: 2023-01-01
Payer: COMMERCIAL

## 2023-01-01 ENCOUNTER — MYC MEDICAL ADVICE (OUTPATIENT)
Dept: PEDIATRICS | Facility: CLINIC | Age: 83
End: 2023-01-01
Payer: COMMERCIAL

## 2023-01-01 ENCOUNTER — TELEPHONE (OUTPATIENT)
Dept: GERIATRICS | Facility: CLINIC | Age: 83
End: 2023-01-01
Payer: COMMERCIAL

## 2023-01-01 ENCOUNTER — TRANSFERRED RECORDS (OUTPATIENT)
Dept: HEALTH INFORMATION MANAGEMENT | Facility: CLINIC | Age: 83
End: 2023-01-01

## 2023-01-01 ENCOUNTER — LAB REQUISITION (OUTPATIENT)
Dept: LAB | Facility: CLINIC | Age: 83
End: 2023-01-01
Payer: COMMERCIAL

## 2023-01-01 ENCOUNTER — APPOINTMENT (OUTPATIENT)
Dept: CT IMAGING | Facility: CLINIC | Age: 83
End: 2023-01-01
Attending: EMERGENCY MEDICINE
Payer: COMMERCIAL

## 2023-01-01 ENCOUNTER — NURSE TRIAGE (OUTPATIENT)
Dept: NURSING | Facility: CLINIC | Age: 83
End: 2023-01-01
Payer: COMMERCIAL

## 2023-01-01 ENCOUNTER — VIRTUAL VISIT (OUTPATIENT)
Dept: PEDIATRICS | Facility: CLINIC | Age: 83
End: 2023-01-01
Payer: COMMERCIAL

## 2023-01-01 ENCOUNTER — TELEPHONE (OUTPATIENT)
Dept: PODIATRY | Facility: CLINIC | Age: 83
End: 2023-01-01
Payer: COMMERCIAL

## 2023-01-01 ENCOUNTER — ANTICOAGULATION THERAPY VISIT (OUTPATIENT)
Dept: ANTICOAGULATION | Facility: CLINIC | Age: 83
End: 2023-01-01

## 2023-01-01 ENCOUNTER — DOCUMENTATION ONLY (OUTPATIENT)
Dept: PEDIATRICS | Facility: CLINIC | Age: 83
End: 2023-01-01

## 2023-01-01 ENCOUNTER — TELEPHONE (OUTPATIENT)
Dept: GERIATRICS | Facility: CLINIC | Age: 83
End: 2023-01-01

## 2023-01-01 ENCOUNTER — TELEPHONE (OUTPATIENT)
Dept: PEDIATRICS | Facility: CLINIC | Age: 83
End: 2023-01-01

## 2023-01-01 ENCOUNTER — MYC MEDICAL ADVICE (OUTPATIENT)
Dept: PEDIATRICS | Facility: CLINIC | Age: 83
End: 2023-01-01

## 2023-01-01 ENCOUNTER — DISCHARGE SUMMARY NURSING HOME (OUTPATIENT)
Dept: GERIATRICS | Facility: CLINIC | Age: 83
End: 2023-01-01
Payer: COMMERCIAL

## 2023-01-01 ENCOUNTER — MEDICAL CORRESPONDENCE (OUTPATIENT)
Dept: HEALTH INFORMATION MANAGEMENT | Facility: CLINIC | Age: 83
End: 2023-01-01

## 2023-01-01 ENCOUNTER — APPOINTMENT (OUTPATIENT)
Dept: GENERAL RADIOLOGY | Facility: CLINIC | Age: 83
End: 2023-01-01
Attending: EMERGENCY MEDICINE
Payer: COMMERCIAL

## 2023-01-01 ENCOUNTER — NURSE TRIAGE (OUTPATIENT)
Dept: PEDIATRICS | Facility: CLINIC | Age: 83
End: 2023-01-01

## 2023-01-01 ENCOUNTER — MEDICAL CORRESPONDENCE (OUTPATIENT)
Dept: PEDIATRICS | Facility: CLINIC | Age: 83
End: 2023-01-01

## 2023-01-01 ENCOUNTER — ANCILLARY PROCEDURE (OUTPATIENT)
Dept: GENERAL RADIOLOGY | Facility: CLINIC | Age: 83
End: 2023-01-01
Attending: PHYSICIAN ASSISTANT
Payer: COMMERCIAL

## 2023-01-01 ENCOUNTER — ANCILLARY PROCEDURE (OUTPATIENT)
Dept: GENERAL RADIOLOGY | Facility: CLINIC | Age: 83
End: 2023-01-01
Attending: PODIATRIST
Payer: COMMERCIAL

## 2023-01-01 ENCOUNTER — HOSPITAL ENCOUNTER (OUTPATIENT)
Facility: CLINIC | Age: 83
Setting detail: OBSERVATION
Discharge: HOME | End: 2023-01-28
Attending: EMERGENCY MEDICINE | Admitting: INTERNAL MEDICINE
Payer: COMMERCIAL

## 2023-01-01 VITALS
BODY MASS INDEX: 28.98 KG/M2 | WEIGHT: 207 LBS | OXYGEN SATURATION: 97 % | DIASTOLIC BLOOD PRESSURE: 40 MMHG | HEART RATE: 68 BPM | HEIGHT: 71 IN | TEMPERATURE: 97.5 F | RESPIRATION RATE: 14 BRPM | SYSTOLIC BLOOD PRESSURE: 98 MMHG

## 2023-01-01 VITALS
HEART RATE: 88 BPM | SYSTOLIC BLOOD PRESSURE: 132 MMHG | TEMPERATURE: 97.2 F | OXYGEN SATURATION: 97 % | DIASTOLIC BLOOD PRESSURE: 74 MMHG

## 2023-01-01 VITALS
OXYGEN SATURATION: 91 % | HEIGHT: 71 IN | BODY MASS INDEX: 26.88 KG/M2 | RESPIRATION RATE: 18 BRPM | TEMPERATURE: 97.4 F | WEIGHT: 192 LBS | SYSTOLIC BLOOD PRESSURE: 102 MMHG | DIASTOLIC BLOOD PRESSURE: 71 MMHG | HEART RATE: 87 BPM

## 2023-01-01 VITALS
SYSTOLIC BLOOD PRESSURE: 105 MMHG | HEART RATE: 81 BPM | TEMPERATURE: 96.7 F | WEIGHT: 195.5 LBS | BODY MASS INDEX: 27.27 KG/M2 | DIASTOLIC BLOOD PRESSURE: 72 MMHG | RESPIRATION RATE: 20 BRPM | OXYGEN SATURATION: 98 %

## 2023-01-01 VITALS
TEMPERATURE: 97.3 F | WEIGHT: 207.2 LBS | SYSTOLIC BLOOD PRESSURE: 92 MMHG | RESPIRATION RATE: 20 BRPM | HEART RATE: 79 BPM | DIASTOLIC BLOOD PRESSURE: 60 MMHG | BODY MASS INDEX: 28.3 KG/M2 | OXYGEN SATURATION: 97 %

## 2023-01-01 VITALS
HEART RATE: 76 BPM | DIASTOLIC BLOOD PRESSURE: 68 MMHG | TEMPERATURE: 97.9 F | SYSTOLIC BLOOD PRESSURE: 110 MMHG | HEIGHT: 71 IN | OXYGEN SATURATION: 93 % | BODY MASS INDEX: 24.92 KG/M2 | RESPIRATION RATE: 18 BRPM | WEIGHT: 178 LBS

## 2023-01-01 VITALS — DIASTOLIC BLOOD PRESSURE: 60 MMHG | SYSTOLIC BLOOD PRESSURE: 100 MMHG

## 2023-01-01 VITALS
DIASTOLIC BLOOD PRESSURE: 66 MMHG | RESPIRATION RATE: 23 BRPM | HEART RATE: 61 BPM | OXYGEN SATURATION: 94 % | TEMPERATURE: 100.4 F | SYSTOLIC BLOOD PRESSURE: 105 MMHG

## 2023-01-01 VITALS
WEIGHT: 175 LBS | HEIGHT: 71 IN | DIASTOLIC BLOOD PRESSURE: 68 MMHG | SYSTOLIC BLOOD PRESSURE: 110 MMHG | OXYGEN SATURATION: 93 % | BODY MASS INDEX: 24.5 KG/M2 | TEMPERATURE: 97.9 F | HEART RATE: 76 BPM | RESPIRATION RATE: 18 BRPM

## 2023-01-01 VITALS — DIASTOLIC BLOOD PRESSURE: 68 MMHG | BODY MASS INDEX: 26.78 KG/M2 | SYSTOLIC BLOOD PRESSURE: 110 MMHG | WEIGHT: 192 LBS

## 2023-01-01 VITALS
SYSTOLIC BLOOD PRESSURE: 110 MMHG | HEART RATE: 76 BPM | RESPIRATION RATE: 18 BRPM | WEIGHT: 175 LBS | HEIGHT: 71 IN | DIASTOLIC BLOOD PRESSURE: 68 MMHG | TEMPERATURE: 97.9 F | BODY MASS INDEX: 24.5 KG/M2 | OXYGEN SATURATION: 93 %

## 2023-01-01 VITALS — SYSTOLIC BLOOD PRESSURE: 108 MMHG | BODY MASS INDEX: 28.87 KG/M2 | DIASTOLIC BLOOD PRESSURE: 68 MMHG | WEIGHT: 207 LBS

## 2023-01-01 DIAGNOSIS — I48.20 CHRONIC ATRIAL FIBRILLATION (H): ICD-10-CM

## 2023-01-01 DIAGNOSIS — I25.5 ISCHEMIC CARDIOMYOPATHY: ICD-10-CM

## 2023-01-01 DIAGNOSIS — M20.41 HAMMERTOE OF SECOND TOE OF RIGHT FOOT: ICD-10-CM

## 2023-01-01 DIAGNOSIS — Z79.01 LONG TERM CURRENT USE OF ANTICOAGULANT THERAPY: Primary | ICD-10-CM

## 2023-01-01 DIAGNOSIS — Q13.4: ICD-10-CM

## 2023-01-01 DIAGNOSIS — L03.116 CELLULITIS OF LEFT LOWER EXTREMITY: ICD-10-CM

## 2023-01-01 DIAGNOSIS — I50.22 CHRONIC SYSTOLIC CONGESTIVE HEART FAILURE (H): ICD-10-CM

## 2023-01-01 DIAGNOSIS — I48.20 CHRONIC ATRIAL FIBRILLATION (H): Primary | ICD-10-CM

## 2023-01-01 DIAGNOSIS — Z79.01 LONG TERM CURRENT USE OF ANTICOAGULANT THERAPY: ICD-10-CM

## 2023-01-01 DIAGNOSIS — L03.116 LEFT LEG CELLULITIS: Primary | ICD-10-CM

## 2023-01-01 DIAGNOSIS — Z13.220 SCREENING FOR HYPERLIPIDEMIA: ICD-10-CM

## 2023-01-01 DIAGNOSIS — E87.6 HYPOKALEMIA: ICD-10-CM

## 2023-01-01 DIAGNOSIS — R05.1 ACUTE COUGH: ICD-10-CM

## 2023-01-01 DIAGNOSIS — Z00.00 ENCOUNTER FOR ANNUAL WELLNESS EXAM IN MEDICARE PATIENT: Primary | ICD-10-CM

## 2023-01-01 DIAGNOSIS — L03.031 CELLULITIS OF RIGHT TOE: ICD-10-CM

## 2023-01-01 DIAGNOSIS — I25.10 CORONARY ARTERY DISEASE INVOLVING NATIVE CORONARY ARTERY OF NATIVE HEART WITHOUT ANGINA PECTORIS: ICD-10-CM

## 2023-01-01 DIAGNOSIS — E78.5 DYSLIPIDEMIA: ICD-10-CM

## 2023-01-01 DIAGNOSIS — S09.93XA FACIAL TRAUMA, INITIAL ENCOUNTER: Primary | ICD-10-CM

## 2023-01-01 DIAGNOSIS — G60.3 IDIOPATHIC PROGRESSIVE NEUROPATHY: ICD-10-CM

## 2023-01-01 DIAGNOSIS — L97.512 ULCER OF RIGHT SECOND TOE WITH FAT LAYER EXPOSED (H): ICD-10-CM

## 2023-01-01 DIAGNOSIS — I50.22 CHRONIC SYSTOLIC CONGESTIVE HEART FAILURE (H): Primary | ICD-10-CM

## 2023-01-01 DIAGNOSIS — R05.1 ACUTE COUGH: Primary | ICD-10-CM

## 2023-01-01 DIAGNOSIS — G60.3 IDIOPATHIC PROGRESSIVE NEUROPATHY: Primary | ICD-10-CM

## 2023-01-01 DIAGNOSIS — I10 ESSENTIAL HYPERTENSION, BENIGN: ICD-10-CM

## 2023-01-01 DIAGNOSIS — E44.0 MODERATE PROTEIN-CALORIE MALNUTRITION (H): ICD-10-CM

## 2023-01-01 DIAGNOSIS — Z79.01 ANTICOAGULATED ON COUMADIN: ICD-10-CM

## 2023-01-01 DIAGNOSIS — I48.91 UNSPECIFIED ATRIAL FIBRILLATION (H): ICD-10-CM

## 2023-01-01 DIAGNOSIS — M62.81 GENERALIZED MUSCLE WEAKNESS: ICD-10-CM

## 2023-01-01 DIAGNOSIS — N18.31 STAGE 3A CHRONIC KIDNEY DISEASE (H): ICD-10-CM

## 2023-01-01 DIAGNOSIS — M79.671 RIGHT FOOT PAIN: Primary | ICD-10-CM

## 2023-01-01 DIAGNOSIS — Z95.810 AICD (AUTOMATIC CARDIOVERTER/DEFIBRILLATOR) PRESENT: ICD-10-CM

## 2023-01-01 DIAGNOSIS — I50.23 ACUTE ON CHRONIC SYSTOLIC (CONGESTIVE) HEART FAILURE (H): ICD-10-CM

## 2023-01-01 DIAGNOSIS — Z01.818 PREOP GENERAL PHYSICAL EXAM: Primary | ICD-10-CM

## 2023-01-01 DIAGNOSIS — L03.116 CELLULITIS OF LEFT LOWER EXTREMITY: Primary | ICD-10-CM

## 2023-01-01 DIAGNOSIS — U07.1 COVID-19: ICD-10-CM

## 2023-01-01 DIAGNOSIS — Z53.9 DIAGNOSIS NOT YET DEFINED: Primary | ICD-10-CM

## 2023-01-01 DIAGNOSIS — U07.1 INFECTION DUE TO 2019 NOVEL CORONAVIRUS: ICD-10-CM

## 2023-01-01 DIAGNOSIS — R53.1 GENERALIZED WEAKNESS: ICD-10-CM

## 2023-01-01 DIAGNOSIS — N52.9 ERECTILE DYSFUNCTION, UNSPECIFIED ERECTILE DYSFUNCTION TYPE: ICD-10-CM

## 2023-01-01 DIAGNOSIS — R40.0 DAYTIME SLEEPINESS: ICD-10-CM

## 2023-01-01 DIAGNOSIS — I48.0 PAROXYSMAL ATRIAL FIBRILLATION (H): ICD-10-CM
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
## 2023-01-01 DIAGNOSIS — M79.671 RIGHT FOOT PAIN: ICD-10-CM

## 2023-01-01 DIAGNOSIS — L97.512 ULCER OF RIGHT SECOND TOE WITH FAT LAYER EXPOSED (H): Primary | ICD-10-CM

## 2023-01-01 LAB
ALBUMIN SERPL BCG-MCNC: 3.7 G/DL (ref 3.5–5.2)
ALBUMIN UR-MCNC: 10 MG/DL
ALP SERPL-CCNC: 82 U/L (ref 40–129)
ALT SERPL W P-5'-P-CCNC: 56 U/L (ref 10–50)
ANION GAP SERPL CALCULATED.3IONS-SCNC: 10 MMOL/L (ref 7–15)
ANION GAP SERPL CALCULATED.3IONS-SCNC: 17 MMOL/L (ref 7–15)
APPEARANCE UR: CLEAR
AST SERPL W P-5'-P-CCNC: 33 U/L (ref 10–50)
ATRIAL RATE - MUSE: 72 BPM
B BURGDOR IGG+IGM SER QL: 0.06
BASE EXCESS BLDV CALC-SCNC: 6.7 MMOL/L (ref -7.7–1.9)
BASOPHILS # BLD AUTO: 0 10E3/UL (ref 0–0.2)
BASOPHILS # BLD AUTO: 0 10E3/UL (ref 0–0.2)
BASOPHILS NFR BLD AUTO: 0 %
BASOPHILS NFR BLD AUTO: 0 %
BILIRUB SERPL-MCNC: 1.1 MG/DL
BILIRUB UR QL STRIP: NEGATIVE
BUN SERPL-MCNC: 25 MG/DL (ref 8–23)
BUN SERPL-MCNC: 36.8 MG/DL (ref 8–23)
CALCIUM SERPL-MCNC: 7.7 MG/DL (ref 8.8–10.2)
CALCIUM SERPL-MCNC: 9.5 MG/DL (ref 8.8–10.2)
CHLORIDE SERPL-SCNC: 104 MMOL/L (ref 98–107)
CHLORIDE SERPL-SCNC: 93 MMOL/L (ref 98–107)
CHOLEST SERPL-MCNC: 92 MG/DL
COLOR UR AUTO: ABNORMAL
CREAT SERPL-MCNC: 1.24 MG/DL (ref 0.67–1.17)
CREAT SERPL-MCNC: 1.58 MG/DL (ref 0.67–1.17)
DEPRECATED HCO3 PLAS-SCNC: 26 MMOL/L (ref 22–29)
DEPRECATED HCO3 PLAS-SCNC: 28 MMOL/L (ref 22–29)
DIASTOLIC BLOOD PRESSURE - MUSE: NORMAL MMHG
EOSINOPHIL # BLD AUTO: 0 10E3/UL (ref 0–0.7)
EOSINOPHIL # BLD AUTO: 0 10E3/UL (ref 0–0.7)
EOSINOPHIL NFR BLD AUTO: 0 %
EOSINOPHIL NFR BLD AUTO: 1 %
ERYTHROCYTE [DISTWIDTH] IN BLOOD BY AUTOMATED COUNT: 14.3 % (ref 10–15)
ERYTHROCYTE [DISTWIDTH] IN BLOOD BY AUTOMATED COUNT: 14.8 % (ref 10–15)
ERYTHROCYTE [DISTWIDTH] IN BLOOD BY AUTOMATED COUNT: 16.1 % (ref 10–15)
FLUAV RNA SPEC QL NAA+PROBE: NEGATIVE
FLUBV RNA RESP QL NAA+PROBE: NEGATIVE
GFR SERPL CREATININE-BSD FRML MDRD: 43 ML/MIN/1.73M2
GFR SERPL CREATININE-BSD FRML MDRD: 58 ML/MIN/1.73M2
GLUCOSE SERPL-MCNC: 146 MG/DL (ref 70–99)
GLUCOSE SERPL-MCNC: 89 MG/DL (ref 70–99)
GLUCOSE UR STRIP-MCNC: >=1000 MG/DL
HBA1C MFR BLD: 6.5 %
HCO3 BLDV-SCNC: 33 MMOL/L (ref 21–28)
HCT VFR BLD AUTO: 39 % (ref 40–53)
HCT VFR BLD AUTO: 41 % (ref 40–53)
HCT VFR BLD AUTO: 45.1 % (ref 40–53)
HDLC SERPL-MCNC: 43 MG/DL
HGB BLD-MCNC: 12.5 G/DL (ref 13.3–17.7)
HGB BLD-MCNC: 12.8 G/DL (ref 13.3–17.7)
HGB BLD-MCNC: 14.4 G/DL (ref 13.3–17.7)
HGB UR QL STRIP: ABNORMAL
HYALINE CASTS: 1 /LPF
IMM GRANULOCYTES # BLD: 0 10E3/UL
IMM GRANULOCYTES # BLD: 0.1 10E3/UL
IMM GRANULOCYTES NFR BLD: 0 %
IMM GRANULOCYTES NFR BLD: 1 %
INR (EXTERNAL): 1.8 (ref 0.9–1.1)
INR (EXTERNAL): 2.1 (ref 0.9–1.1)
INR (EXTERNAL): 4.5 (ref 0.9–1.1)
INR (EXTERNAL): 5.4 (ref 0.6–1.1)
INR BLD: 1.5 (ref 0.9–1.1)
INR BLD: 1.8 (ref 0.9–1.1)
INR BLD: 1.9 (ref 0.9–1.1)
INR BLD: 2.2 (ref 0.9–1.1)
INR BLD: 2.6 (ref 0.9–1.1)
INR BLD: 3 (ref 0.9–1.1)
INR BLD: 3.1 (ref 0.9–1.1)
INR BLD: 3.1 (ref 0.9–1.1)
INR BLD: 3.2 (ref 0.9–1.1)
INR BLD: 3.3 (ref 0.9–1.1)
INR BLD: 3.7 (ref 0.9–1.1)
INR PPP: 2.32 (ref 0.85–1.15)
INR PPP: 2.92 (ref 0.85–1.15)
INR PPP: 3.23 (ref 0.85–1.15)
INTERPRETATION ECG - MUSE: NORMAL
KETONES UR STRIP-MCNC: ABNORMAL MG/DL
LDLC SERPL CALC-MCNC: 38 MG/DL
LEUKOCYTE ESTERASE UR QL STRIP: NEGATIVE
LYMPHOCYTES # BLD AUTO: 0.7 10E3/UL (ref 0.8–5.3)
LYMPHOCYTES # BLD AUTO: 0.8 10E3/UL (ref 0.8–5.3)
LYMPHOCYTES NFR BLD AUTO: 10 %
LYMPHOCYTES NFR BLD AUTO: 7 %
MAGNESIUM SERPL-MCNC: 2 MG/DL (ref 1.7–2.3)
MCH RBC QN AUTO: 31.6 PG (ref 26.5–33)
MCH RBC QN AUTO: 32.2 PG (ref 26.5–33)
MCH RBC QN AUTO: 33.1 PG (ref 26.5–33)
MCHC RBC AUTO-ENTMCNC: 31.2 G/DL (ref 31.5–36.5)
MCHC RBC AUTO-ENTMCNC: 31.9 G/DL (ref 31.5–36.5)
MCHC RBC AUTO-ENTMCNC: 32.1 G/DL (ref 31.5–36.5)
MCV RBC AUTO: 101 FL (ref 78–100)
MCV RBC AUTO: 101 FL (ref 78–100)
MCV RBC AUTO: 104 FL (ref 78–100)
MONOCYTES # BLD AUTO: 0.6 10E3/UL (ref 0–1.3)
MONOCYTES # BLD AUTO: 0.8 10E3/UL (ref 0–1.3)
MONOCYTES NFR BLD AUTO: 7 %
MONOCYTES NFR BLD AUTO: 7 %
MUCOUS THREADS #/AREA URNS LPF: PRESENT /LPF
NEUTROPHILS # BLD AUTO: 7 10E3/UL (ref 1.6–8.3)
NEUTROPHILS # BLD AUTO: 9.4 10E3/UL (ref 1.6–8.3)
NEUTROPHILS NFR BLD AUTO: 83 %
NEUTROPHILS NFR BLD AUTO: 85 %
NITRATE UR QL: NEGATIVE
NONHDLC SERPL-MCNC: 49 MG/DL
NRBC # BLD AUTO: 0 10E3/UL
NRBC BLD AUTO-RTO: 0 /100
NT-PROBNP SERPL-MCNC: ABNORMAL PG/ML (ref 0–1800)
O2/TOTAL GAS SETTING VFR VENT: 21 %
OXYHGB MFR BLDV: 17 % (ref 70–75)
P AXIS - MUSE: 64 DEGREES
PCO2 BLDV: 54 MM HG (ref 40–50)
PH BLDV: 7.4 [PH] (ref 7.32–7.43)
PH UR STRIP: 5.5 [PH] (ref 5–7)
PLATELET # BLD AUTO: 167 10E3/UL (ref 150–450)
PLATELET # BLD AUTO: 245 10E3/UL (ref 150–450)
PLATELET # BLD AUTO: 355 10E3/UL (ref 150–450)
PO2 BLDV: 17 MM HG (ref 25–47)
POTASSIUM SERPL-SCNC: 2.9 MMOL/L (ref 3.4–5.3)
POTASSIUM SERPL-SCNC: 2.9 MMOL/L (ref 3.4–5.3)
POTASSIUM SERPL-SCNC: 3.4 MMOL/L (ref 3.4–5.3)
POTASSIUM SERPL-SCNC: 3.9 MMOL/L (ref 3.4–5.3)
PR INTERVAL - MUSE: 130 MS
PROT SERPL-MCNC: 5.7 G/DL (ref 6.4–8.3)
QRS DURATION - MUSE: 160 MS
QT - MUSE: 466 MS
QTC - MUSE: 510 MS
R AXIS - MUSE: 18 DEGREES
RBC # BLD AUTO: 3.88 10E6/UL (ref 4.4–5.9)
RBC # BLD AUTO: 4.05 10E6/UL (ref 4.4–5.9)
RBC # BLD AUTO: 4.35 10E6/UL (ref 4.4–5.9)
RBC URINE: 15 /HPF
RSV RNA SPEC NAA+PROBE: NEGATIVE
SARS-COV-2 RNA RESP QL NAA+PROBE: NEGATIVE
SARS-COV-2 RNA RESP QL NAA+PROBE: POSITIVE
SODIUM SERPL-SCNC: 136 MMOL/L (ref 136–145)
SODIUM SERPL-SCNC: 142 MMOL/L (ref 136–145)
SP GR UR STRIP: 1.02 (ref 1–1.03)
SYSTOLIC BLOOD PRESSURE - MUSE: NORMAL MMHG
T AXIS - MUSE: 45 DEGREES
T4 FREE SERPL-MCNC: 2.12 NG/DL (ref 0.9–1.7)
TRIGL SERPL-MCNC: 55 MG/DL
TROPONIN T SERPL HS-MCNC: 20 NG/L
TSH SERPL DL<=0.005 MIU/L-ACNC: 6.38 UIU/ML (ref 0.3–4.2)
UROBILINOGEN UR STRIP-MCNC: NORMAL MG/DL
VENTRICULAR RATE- MUSE: 72 BPM
WBC # BLD AUTO: 10.9 10E3/UL (ref 4–11)
WBC # BLD AUTO: 7 10E3/UL (ref 4–11)
WBC # BLD AUTO: 8.4 10E3/UL (ref 4–11)
WBC URINE: <1 /HPF

## 2023-01-01 PROCEDURE — G0378 HOSPITAL OBSERVATION PER HR: HCPCS

## 2023-01-01 PROCEDURE — 85610 PROTHROMBIN TIME: CPT

## 2023-01-01 PROCEDURE — 36415 COLL VENOUS BLD VENIPUNCTURE: CPT | Mod: ORL | Performed by: FAMILY MEDICINE

## 2023-01-01 PROCEDURE — 11042 DBRDMT SUBQ TIS 1ST 20SQCM/<: CPT | Performed by: PODIATRIST

## 2023-01-01 PROCEDURE — 250N000013 HC RX MED GY IP 250 OP 250 PS 637: Performed by: EMERGENCY MEDICINE

## 2023-01-01 PROCEDURE — 99310 SBSQ NF CARE HIGH MDM 45: CPT | Performed by: NURSE PRACTITIONER

## 2023-01-01 PROCEDURE — 85610 PROTHROMBIN TIME: CPT | Performed by: INTERNAL MEDICINE

## 2023-01-01 PROCEDURE — P9604 ONE-WAY ALLOW PRORATED TRIP: HCPCS | Mod: ORL | Performed by: FAMILY MEDICINE

## 2023-01-01 PROCEDURE — 86618 LYME DISEASE ANTIBODY: CPT

## 2023-01-01 PROCEDURE — 85025 COMPLETE CBC W/AUTO DIFF WBC: CPT | Performed by: EMERGENCY MEDICINE

## 2023-01-01 PROCEDURE — 85027 COMPLETE CBC AUTOMATED: CPT | Mod: ORL | Performed by: FAMILY MEDICINE

## 2023-01-01 PROCEDURE — 84484 ASSAY OF TROPONIN QUANT: CPT | Performed by: EMERGENCY MEDICINE

## 2023-01-01 PROCEDURE — 84132 ASSAY OF SERUM POTASSIUM: CPT | Mod: ORL | Performed by: FAMILY MEDICINE

## 2023-01-01 PROCEDURE — G0439 PPPS, SUBSEQ VISIT: HCPCS | Mod: 95 | Performed by: INTERNAL MEDICINE

## 2023-01-01 PROCEDURE — C9803 HOPD COVID-19 SPEC COLLECT: HCPCS

## 2023-01-01 PROCEDURE — 99285 EMERGENCY DEPT VISIT HI MDM: CPT | Mod: 25,CS

## 2023-01-01 PROCEDURE — 36415 COLL VENOUS BLD VENIPUNCTURE: CPT

## 2023-01-01 PROCEDURE — 85025 COMPLETE CBC W/AUTO DIFF WBC: CPT | Performed by: INTERNAL MEDICINE

## 2023-01-01 PROCEDURE — 36416 COLLJ CAPILLARY BLOOD SPEC: CPT

## 2023-01-01 PROCEDURE — 71046 X-RAY EXAM CHEST 2 VIEWS: CPT | Mod: TC | Performed by: RADIOLOGY

## 2023-01-01 PROCEDURE — 71046 X-RAY EXAM CHEST 2 VIEWS: CPT

## 2023-01-01 PROCEDURE — 250N000009 HC RX 250: Performed by: EMERGENCY MEDICINE

## 2023-01-01 PROCEDURE — 99207 PR NO BILLABLE SERVICE THIS VISIT: CPT | Performed by: PHYSICIAN ASSISTANT

## 2023-01-01 PROCEDURE — 82805 BLOOD GASES W/O2 SATURATION: CPT | Performed by: EMERGENCY MEDICINE

## 2023-01-01 PROCEDURE — 81001 URINALYSIS AUTO W/SCOPE: CPT | Performed by: EMERGENCY MEDICINE

## 2023-01-01 PROCEDURE — 85610 PROTHROMBIN TIME: CPT | Performed by: EMERGENCY MEDICINE

## 2023-01-01 PROCEDURE — 99316 NF DSCHRG MGMT 30 MIN+: CPT | Performed by: NURSE PRACTITIONER

## 2023-01-01 PROCEDURE — 36415 COLL VENOUS BLD VENIPUNCTURE: CPT | Performed by: INTERNAL MEDICINE

## 2023-01-01 PROCEDURE — 99213 OFFICE O/P EST LOW 20 MIN: CPT | Mod: 25 | Performed by: PODIATRIST

## 2023-01-01 PROCEDURE — 83880 ASSAY OF NATRIURETIC PEPTIDE: CPT | Mod: ORL | Performed by: FAMILY MEDICINE

## 2023-01-01 PROCEDURE — 99214 OFFICE O/P EST MOD 30 MIN: CPT | Performed by: INTERNAL MEDICINE

## 2023-01-01 PROCEDURE — G0180 MD CERTIFICATION HHA PATIENT: HCPCS | Performed by: INTERNAL MEDICINE

## 2023-01-01 PROCEDURE — 73630 X-RAY EXAM OF FOOT: CPT | Mod: TC | Performed by: RADIOLOGY

## 2023-01-01 PROCEDURE — 85610 PROTHROMBIN TIME: CPT | Mod: ORL | Performed by: FAMILY MEDICINE

## 2023-01-01 PROCEDURE — 87637 SARSCOV2&INF A&B&RSV AMP PRB: CPT | Performed by: EMERGENCY MEDICINE

## 2023-01-01 PROCEDURE — 83735 ASSAY OF MAGNESIUM: CPT | Mod: ORL | Performed by: FAMILY MEDICINE

## 2023-01-01 PROCEDURE — 99305 1ST NF CARE MODERATE MDM 35: CPT | Performed by: FAMILY MEDICINE

## 2023-01-01 PROCEDURE — 80048 BASIC METABOLIC PNL TOTAL CA: CPT | Mod: ORL | Performed by: FAMILY MEDICINE

## 2023-01-01 PROCEDURE — 70450 CT HEAD/BRAIN W/O DYE: CPT

## 2023-01-01 PROCEDURE — 83036 HEMOGLOBIN GLYCOSYLATED A1C: CPT | Mod: ORL | Performed by: FAMILY MEDICINE

## 2023-01-01 PROCEDURE — 87635 SARS-COV-2 COVID-19 AMP PRB: CPT | Mod: ORL | Performed by: FAMILY MEDICINE

## 2023-01-01 PROCEDURE — 36415 COLL VENOUS BLD VENIPUNCTURE: CPT | Performed by: EMERGENCY MEDICINE

## 2023-01-01 PROCEDURE — 99214 OFFICE O/P EST MOD 30 MIN: CPT | Mod: 25 | Performed by: PODIATRIST

## 2023-01-01 PROCEDURE — 84439 ASSAY OF FREE THYROXINE: CPT

## 2023-01-01 PROCEDURE — 80061 LIPID PANEL: CPT

## 2023-01-01 PROCEDURE — 80053 COMPREHEN METABOLIC PANEL: CPT | Performed by: EMERGENCY MEDICINE

## 2023-01-01 PROCEDURE — 99214 OFFICE O/P EST MOD 30 MIN: CPT | Performed by: PHYSICIAN ASSISTANT

## 2023-01-01 PROCEDURE — 94640 AIRWAY INHALATION TREATMENT: CPT

## 2023-01-01 PROCEDURE — 84443 ASSAY THYROID STIM HORMONE: CPT

## 2023-01-01 PROCEDURE — 93005 ELECTROCARDIOGRAM TRACING: CPT

## 2023-01-01 RX ORDER — POTASSIUM CHLORIDE 1500 MG/1
40 TABLET, EXTENDED RELEASE ORAL DAILY
COMMUNITY
Start: 2023-01-01

## 2023-01-01 RX ORDER — ONDANSETRON 4 MG/1
4 TABLET, ORALLY DISINTEGRATING ORAL EVERY 6 HOURS PRN
Status: CANCELLED | OUTPATIENT
Start: 2023-01-01

## 2023-01-01 RX ORDER — METOPROLOL SUCCINATE 25 MG/1
25 TABLET, EXTENDED RELEASE ORAL DAILY
COMMUNITY
Start: 2023-01-01 | End: 2023-01-01

## 2023-01-01 RX ORDER — ACETAMINOPHEN 650 MG/1
650 SUPPOSITORY RECTAL EVERY 6 HOURS PRN
Status: CANCELLED | OUTPATIENT
Start: 2023-01-01

## 2023-01-01 RX ORDER — POLYETHYLENE GLYCOL 3350 17 G/17G
17 POWDER, FOR SOLUTION ORAL DAILY PRN
Status: CANCELLED | OUTPATIENT
Start: 2023-01-01

## 2023-01-01 RX ORDER — AZITHROMYCIN 250 MG/1
TABLET, FILM COATED ORAL
Qty: 6 TABLET | Refills: 0 | Status: SHIPPED | OUTPATIENT
Start: 2023-01-01 | End: 2023-01-01

## 2023-01-01 RX ORDER — WARFARIN SODIUM 5 MG/1
TABLET ORAL
Qty: 78 TABLET | Refills: 0 | Status: CANCELLED
Start: 2023-01-01

## 2023-01-01 RX ORDER — LIDOCAINE 40 MG/G
CREAM TOPICAL
Status: CANCELLED | OUTPATIENT
Start: 2023-01-01

## 2023-01-01 RX ORDER — DULOXETIN HYDROCHLORIDE 30 MG/1
CAPSULE, DELAYED RELEASE ORAL
Qty: 180 CAPSULE | Refills: 3 | Status: SHIPPED | OUTPATIENT
Start: 2023-01-01

## 2023-01-01 RX ORDER — MULTIVIT-MIN/IRON/FOLIC ACID/K 18-600-40
1000 CAPSULE ORAL DAILY
COMMUNITY

## 2023-01-01 RX ORDER — TADALAFIL 10 MG/1
10 TABLET ORAL DAILY PRN
Qty: 6 TABLET | Refills: 11 | Status: SHIPPED | OUTPATIENT
Start: 2023-01-01 | End: 2023-01-01

## 2023-01-01 RX ORDER — TORSEMIDE 100 MG/1
100 TABLET ORAL DAILY
COMMUNITY

## 2023-01-01 RX ORDER — SPIRONOLACTONE 25 MG/1
12.5 TABLET ORAL DAILY
COMMUNITY

## 2023-01-01 RX ORDER — SODIUM CHLORIDE 9 MG/ML
INJECTION, SOLUTION INTRAVENOUS CONTINUOUS
Status: CANCELLED | OUTPATIENT
Start: 2023-01-01 | End: 2023-01-01

## 2023-01-01 RX ORDER — ACETAMINOPHEN 325 MG/1
650 TABLET ORAL ONCE
Status: COMPLETED | OUTPATIENT
Start: 2023-01-01 | End: 2023-01-01

## 2023-01-01 RX ORDER — PANTOPRAZOLE SODIUM 40 MG/1
40 TABLET, DELAYED RELEASE ORAL DAILY
COMMUNITY

## 2023-01-01 RX ORDER — AMOXICILLIN 250 MG
1 CAPSULE ORAL 2 TIMES DAILY PRN
Status: CANCELLED | OUTPATIENT
Start: 2023-01-01

## 2023-01-01 RX ORDER — CYCLOSPORINE 0.5 MG/ML
1 EMULSION OPHTHALMIC 2 TIMES DAILY
COMMUNITY

## 2023-01-01 RX ORDER — IPRATROPIUM BROMIDE AND ALBUTEROL SULFATE 2.5; .5 MG/3ML; MG/3ML
6 SOLUTION RESPIRATORY (INHALATION) ONCE
Status: COMPLETED | OUTPATIENT
Start: 2023-01-01 | End: 2023-01-01

## 2023-01-01 RX ORDER — HYDRALAZINE HYDROCHLORIDE 10 MG/1
20 TABLET, FILM COATED ORAL 3 TIMES DAILY
COMMUNITY

## 2023-01-01 RX ORDER — AMOXICILLIN 250 MG
2 CAPSULE ORAL 2 TIMES DAILY PRN
Status: CANCELLED | OUTPATIENT
Start: 2023-01-01

## 2023-01-01 RX ORDER — WARFARIN SODIUM 5 MG/1
TABLET ORAL
Qty: 78 TABLET | Refills: 1 | Status: SHIPPED | OUTPATIENT
Start: 2023-01-01 | End: 2023-01-01 | Stop reason: DRUGHIGH

## 2023-01-01 RX ORDER — ROSUVASTATIN CALCIUM 10 MG/1
10 TABLET, COATED ORAL DAILY
COMMUNITY

## 2023-01-01 RX ORDER — ACETAMINOPHEN 325 MG/1
650 TABLET ORAL EVERY 6 HOURS PRN
Status: CANCELLED | OUTPATIENT
Start: 2023-01-01

## 2023-01-01 RX ORDER — CEPHALEXIN 500 MG/1
500 CAPSULE ORAL 3 TIMES DAILY
Qty: 30 CAPSULE | Refills: 0 | Status: SHIPPED | OUTPATIENT
Start: 2023-01-01 | End: 2023-09-14

## 2023-01-01 RX ORDER — WARFARIN SODIUM 2 MG/1
TABLET ORAL
Qty: 90 TABLET | Refills: 1 | Status: SHIPPED | OUTPATIENT
Start: 2023-01-01 | End: 2023-01-01

## 2023-01-01 RX ORDER — ONDANSETRON 2 MG/ML
4 INJECTION INTRAMUSCULAR; INTRAVENOUS EVERY 6 HOURS PRN
Status: CANCELLED | OUTPATIENT
Start: 2023-01-01

## 2023-01-01 RX ORDER — CEPHALEXIN 500 MG/1
500 CAPSULE ORAL 2 TIMES DAILY
Qty: 20 CAPSULE | Refills: 0 | Status: SHIPPED | OUTPATIENT
Start: 2023-01-01 | End: 2023-01-01

## 2023-01-01 RX ORDER — AMIODARONE HYDROCHLORIDE 200 MG/1
200 TABLET ORAL DAILY
COMMUNITY

## 2023-01-01 RX ADMIN — IPRATROPIUM BROMIDE AND ALBUTEROL SULFATE 6 ML: .5; 3 SOLUTION RESPIRATORY (INHALATION) at 19:57

## 2023-01-01 RX ADMIN — ACETAMINOPHEN 650 MG: 325 TABLET, FILM COATED ORAL at 19:57

## 2023-01-01 SDOH — ECONOMIC STABILITY: FOOD INSECURITY: WITHIN THE PAST 12 MONTHS, THE FOOD YOU BOUGHT JUST DIDN'T LAST AND YOU DIDN'T HAVE MONEY TO GET MORE.: NEVER TRUE

## 2023-01-01 SDOH — ECONOMIC STABILITY: INCOME INSECURITY: IN THE LAST 12 MONTHS, WAS THERE A TIME WHEN YOU WERE NOT ABLE TO PAY THE MORTGAGE OR RENT ON TIME?: NO

## 2023-01-01 SDOH — HEALTH STABILITY: PHYSICAL HEALTH: ON AVERAGE, HOW MANY DAYS PER WEEK DO YOU ENGAGE IN MODERATE TO STRENUOUS EXERCISE (LIKE A BRISK WALK)?: 3 DAYS

## 2023-01-01 SDOH — ECONOMIC STABILITY: TRANSPORTATION INSECURITY
IN THE PAST 12 MONTHS, HAS LACK OF TRANSPORTATION KEPT YOU FROM MEETINGS, WORK, OR FROM GETTING THINGS NEEDED FOR DAILY LIVING?: NO

## 2023-01-01 SDOH — ECONOMIC STABILITY: TRANSPORTATION INSECURITY
IN THE PAST 12 MONTHS, HAS THE LACK OF TRANSPORTATION KEPT YOU FROM MEDICAL APPOINTMENTS OR FROM GETTING MEDICATIONS?: NO

## 2023-01-01 SDOH — ECONOMIC STABILITY: FOOD INSECURITY: WITHIN THE PAST 12 MONTHS, YOU WORRIED THAT YOUR FOOD WOULD RUN OUT BEFORE YOU GOT MONEY TO BUY MORE.: NEVER TRUE

## 2023-01-01 SDOH — ECONOMIC STABILITY: INCOME INSECURITY: HOW HARD IS IT FOR YOU TO PAY FOR THE VERY BASICS LIKE FOOD, HOUSING, MEDICAL CARE, AND HEATING?: NOT VERY HARD

## 2023-01-01 SDOH — HEALTH STABILITY: PHYSICAL HEALTH: ON AVERAGE, HOW MANY MINUTES DO YOU ENGAGE IN EXERCISE AT THIS LEVEL?: 50 MIN

## 2023-01-01 ASSESSMENT — ENCOUNTER SYMPTOMS
HEMATOCHEZIA: 0
NAUSEA: 0
DIARRHEA: 0
FEVER: 0
NERVOUS/ANXIOUS: 0
HEADACHES: 0
WEAKNESS: 1
HEADACHES: 0
DIZZINESS: 0
WEAKNESS: 1
PALPITATIONS: 0
CHILLS: 0
HEADACHES: 0
VOMITING: 0
PALPITATIONS: 0
DIZZINESS: 0
MYALGIAS: 0
DYSURIA: 0
DIARRHEA: 0
CHILLS: 0
JOINT SWELLING: 0
SHORTNESS OF BREATH: 1
CONSTIPATION: 0
PARESTHESIAS: 0
DIAPHORESIS: 0
HEARTBURN: 0
HEMATURIA: 0
APPETITE CHANGE: 1
SORE THROAT: 0
EYE PAIN: 0
FREQUENCY: 1
ABDOMINAL PAIN: 0
COUGH: 0
SHORTNESS OF BREATH: 0
DYSURIA: 0
FEVER: 0
ABDOMINAL PAIN: 0

## 2023-01-01 ASSESSMENT — ACTIVITIES OF DAILY LIVING (ADL)
ADLS_ACUITY_SCORE: 35
CURRENT_FUNCTION: NO ASSISTANCE NEEDED
ADLS_ACUITY_SCORE: 35

## 2023-01-01 ASSESSMENT — LIFESTYLE VARIABLES
AUDIT-C TOTAL SCORE: 4
HOW MANY STANDARD DRINKS CONTAINING ALCOHOL DO YOU HAVE ON A TYPICAL DAY: 1 OR 2
SKIP TO QUESTIONS 9-10: 1
HOW OFTEN DO YOU HAVE A DRINK CONTAINING ALCOHOL: 4 OR MORE TIMES A WEEK
HOW OFTEN DO YOU HAVE SIX OR MORE DRINKS ON ONE OCCASION: NEVER

## 2023-01-01 ASSESSMENT — PAIN SCALES - GENERAL
PAINLEVEL: NO PAIN (0)
PAINLEVEL: NO PAIN (0)

## 2023-01-01 ASSESSMENT — SOCIAL DETERMINANTS OF HEALTH (SDOH)
IN A TYPICAL WEEK, HOW MANY TIMES DO YOU TALK ON THE PHONE WITH FAMILY, FRIENDS, OR NEIGHBORS?: MORE THAN THREE TIMES A WEEK
DO YOU BELONG TO ANY CLUBS OR ORGANIZATIONS SUCH AS CHURCH GROUPS UNIONS, FRATERNAL OR ATHLETIC GROUPS, OR SCHOOL GROUPS?: NO
HOW OFTEN DO YOU ATTEND CHURCH OR RELIGIOUS SERVICES?: PATIENT DECLINED

## 2023-01-16 NOTE — PROGRESS NOTES
ANTICOAGULATION MANAGEMENT     Malcolm HENDERSON [facility] 82 year old male is on warfarin with therapeutic INR result. (Goal INR 2.0-3.0)    Recent labs: (last 7 days)     01/16/23  1259   INR 3.0*       ASSESSMENT       Source(s): Chart Review and Patient/Caregiver Call       Warfarin doses taken: Warfarin taken as instructed    Diet: Decreased greens/vitamin K in diet; plans to resume previous intake, more alcohol last night while watching football.    New illness, injury, or hospitalization: Yes: Patient reports fatigue, low energy, productive cough    Medication/supplement changes: Z-jovany, see below BPA information    Signs or symptoms of bleeding or clotting: No    Previous INR: Therapeutic last visit; previously outside of goal range    Additional findings: None       PLAN     Recommended plan for temporary change(s) affecting INR     Dosing Instructions: Continue your current warfarin dose with next INR in 3 days       Summary  As of 1/16/2023    Full warfarin instructions:  5 mg every Mon, Wed, Fri; 2.5 mg all other days   Next INR check:  1/19/2023             Telephone call with Jaskaran who verbalizes understanding and agrees to plan    Lab visit scheduled    Education provided:     Please call back if any changes to your diet, medications or how you've been taking warfarin    Interaction IS anticipated between warfarin and Z-jovany    Contact [phone]  with any changes, questions or concerns.     Plan made per [facility] anticoagulation protocol    Deb Reddy RN  Anticoagulation Clinic  1/16/2023    _______________________________________________________________________     Anticoagulation Episode Summary     Current INR goal:  2.0-3.0   TTR:  69.8 % (1 y)   Target end date:  Indefinite   Send INR reminders to:  [facility]    Indications    Long term current use of anticoagulant therapy [Z79.01]  Chronic atrial fibrillation (H) [I48.20]           Comments:           Anticoagulation Care Providers     Provider  Role Specialty Phone number    Tod Callahan MD Referring Internal Medicine - Pediatrics [phone]    Leisa Mistry MD Referring Internal Medicine - Pediatrics [phone]

## 2023-01-16 NOTE — PROGRESS NOTES
ANTICOAGULATION  MANAGEMENT     Interacting Medication Review    Interacting medication(s): Azithromycin (Zithromax, Z-jovany) with warfarin.    Duration: 5 days (1/16/23 to 1/20/23)    Indication: URI    New medication?: Yes, interaction may increase INR and risk of bleeding. Closer INR monitoring recommended.        PLAN     Continue your current warfarin dose with next INR in 3 days      See above for warfarin dosing instruction    Telephone call with Jaskaran who verbalizes understanding and agrees to plan    Discussed with patient while discussing INR    Plan made per [facility] anticoagulation protocol    Deb Reddy RN  Anticoagulation Clinic

## 2023-01-16 NOTE — PROGRESS NOTES
Assessment & Plan     Acute cough  Cough x two weeks and persistent. Begin antibiotics. Signs for emergent evaluation discussed with patient.  - XR Chest 2 Views; Future  - azithromycin (ZITHROMAX) 250 MG tablet; Use as directed.    Rox Delaney PA-C  [facility] Jessica is a 82 year old presenting for the following health issues:  Shortness of Breath      HPI   Acute Illness  Acute illness concerns: general malaise  Onset/Duration: about 2 weeks ago   Symptoms:  Fever: No  Chills/Sweats: No  Headache (location?): No  Sinus Pressure: No  Conjunctivitis:  No  Ear Pain: no  Rhinorrhea: No  Congestion: No  Sore Throat: No  Cough: no, but coughs up foamy sputum   Worse all the time  Wheeze: YES  Sob present and moving around. Ok with sitting.  Prior smoker. Quit 25 years old.   Decreased Appetite: No  Nausea: No  Vomiting: No  Diarrhea: No  Dysuria/Freq.: No  Dysuria or Hematuria: No  Fatigue/Achiness: YES--fatigued  Sick/Strep Exposure: YES- was at [place] during [place] time and relative has GI issues   Therapies tried and outcome: None  Patient states he contacted pacemaker team today due to atrial fibrillation symptoms and was notified that it was negative for A-fib.    Review of Systems   Constitutional, HEENT, cardiovascular, pulmonary, gi and gu systems are negative, except as otherwise noted.      Objective    BP 92/60 (BP Location: Right arm, Patient Position: Sitting, Cuff Size: Adult Large)   Pulse 79   Temp 97.3  F (36.3  C) (Temporal)   Resp 20   Wt 94 kg (207 lb 3.2 oz)   SpO2 97%   BMI 28.30 kg/m    Body mass index is 28.3 kg/m .  Physical Exam   GENERAL: alert and no distress  NECK: no adenopathy  RESP: lungs clear to auscultation - no rales, rhonchi or wheezes  CV: regular rate and rhythm, normal S1 S2, no S3 or S4    Results for orders placed or performed in visit on 01/16/23   XR Chest 2 Views     Status: None    Narrative    CHEST TWO  VIEWS 1/16/2023 1:34 PM     HISTORY: Acute cough.    COMPARISON: None.       Impression    IMPRESSION: Benign granuloma on the left. Cardiac device noted. There  are no acute infiltrates. The cardiac silhouette is not enlarged.  Pulmonary vasculature is unremarkable.    ALICIA MARVIN MD         SYSTEM ID:  [id number]   Results for orders placed or performed in visit on 01/16/23   INR point of care     Status: Abnormal   Result Value Ref Range    INR 3.0 (H) 0.9 - 1.1    Narrative    This test is intended for monitoring Coumadin therapy. Results are not accurate in patients with prolonged INR due to factor deficiency.

## 2023-01-16 NOTE — TELEPHONE ENCOUNTER
"S-(situation): Patient calling, requesting appointment to be seen for possible URI.     B-(background): Patient has had pneumonia in the past, is concerned he may have an infection again.     A-(assessment): Patient has \"not been feeling well\" with \"general malaise\", SOB after going upstairs, slight wheezing. Took COVID test, negative. \"I'm just not myself, energy level is down 50%\". No fever. Denies cough. Patient overall feeling more \"run down\" and wanting to have a provider listen to his lungs for possible infection. No other symptoms noted.     R-(recommendations): Per protocol, scheduled for OV 1/16/23.     Reason for Disposition    MILD difficulty breathing (e.g., minimal/no SOB at rest, SOB with walking, pulse < 100) of new-onset or worse than normal    Additional Information    Negative: Fever > 103 F (39.4 C)    Negative: Fever > 101 F (38.3 C) and over 60 years of age    Negative: Fever > 100.0 F (37.8 C) and bedridden (e.g., nursing home patient, stroke, chronic illness, recovering from surgery)    Negative: Fever > 100.0 F (37.8 C) and diabetes mellitus or weak immune system (e.g., HIV positive, cancer chemo, splenectomy, organ transplant, chronic steroids)    Negative: Periods where breathing stops and then resumes normally and bedridden (e.g., nursing home patient, CVA)    Negative: Pregnant or postpartum (from 0 to 6 weeks after delivery)    Negative: Patient sounds very sick or weak to the triager    Negative: MODERATE difficulty breathing (e.g., speaks in phrases, SOB even at rest, pulse 100-120) of new-onset or worse than normal    Negative: Oxygen level (e.g., pulse oximetry) 90 percent or lower    Negative: Wheezing can be heard across the room    Negative: Drooling or spitting out saliva (because can't swallow)    Negative: Any history of prior \"blood clot\" in leg or lungs    Negative: Illness requiring prolonged bedrest in past month (e.g., immobilization, long hospital stay)    Negative: Hip " "or leg fracture (broken bone) in past month (or had cast on leg or ankle in past month)    Negative: Major surgery in the past month    Negative: Long-distance travel in past month (e.g., car, bus, train, plane; with trip lasting 6 or more hours)    Negative: Cancer treatment in past six months (or has cancer now)    Negative: Extra heart beats OR irregular heart beating (i.e., \"palpitations\")    Negative: Chest pain    Negative: Wheezing (high pitched whistling sound) and previous asthma attacks or use of asthma medicines    Negative: Difficulty breathing and within 14 days of COVID-19 Exposure    Negative: Difficulty breathing and only present when coughing    Negative: Difficulty breathing and only from stuffy nose    Negative: Difficulty breathing and only from stuffy nose or runny nose from common cold    Negative: SEVERE difficulty breathing (e.g., struggling for each breath, speaks in single words, pulse > 120)    Negative: Breathing stopped and hasn't returned    Negative: Choking on something    Negative: Bluish (or gray) lips or face    Negative: Difficult to awaken or acting confused (e.g., disoriented, slurred speech)    Negative: Passed out (i.e., fainted, collapsed and was not responding)    Negative: Wheezing started suddenly after medicine, an allergic food, or bee sting    Negative: Stridor    Negative: Slow, shallow and weak breathing    Negative: Sounds like a life-threatening emergency to the triager    Protocols used: BREATHING DIFFICULTY-MARTINA POOL RN 1/16/2023 at 8:48 AM    "

## 2023-01-19 NOTE — PROGRESS NOTES
"ANTICOAGULATION MANAGEMENT     Malcolm [facility] 82 year old male is on warfarin with supratherapeutic INR result. (Goal INR 2.0-3.0)    Recent labs: (last 7 days)     01/19/23  1402   INR 3.7*       ASSESSMENT       Source(s): Chart Review and Patient/Caregiver Call       Warfarin doses taken: Warfarin taken as instructed    Diet: No new diet changes identified    New illness, injury, or hospitalization: Yes: currently being treated for a cough    Medication/supplement changes: Z-pack started on 1/16/23    Signs or symptoms of bleeding or clotting: No    Previous INR: Therapeutic last 2(+) visits    Additional findings: patient states she had an injection in his eye today for \"possible retina issues\"       PLAN     Recommended plan for temporary change(s) affecting INR     Dosing Instructions: hold dose then continue your current warfarin dose with next INR in 10 days       Summary  As of 1/19/2023    Full warfarin instructions:  1/19: Hold; Otherwise 5 mg every Mon, Wed, Fri; 2.5 mg all other days   Next INR check:  1/30/2023             Telephone call with Jaskaran who verbalizes understanding and agrees to plan    Lab visit scheduled    Education provided:     Contact [phone]  with any changes, questions or concerns.     Plan made per [facility] anticoagulation protocol    Kaylah Sherman RN  Anticoagulation Clinic  1/19/2023    _______________________________________________________________________     Anticoagulation Episode Summary     Current INR goal:  2.0-3.0   TTR:  68.9 % (1 y)   Target end date:  Indefinite   Send INR reminders to:  ANTICOAG [facility]    Indications    Long term current use of anticoagulant therapy [Z79.01]  Chronic atrial fibrillation (H) [I48.20]           Comments:           Anticoagulation Care Providers     Provider Role Specialty Phone number    Tod Callahan MD Referring Internal Medicine - Pediatrics [phone]    Leisa Mistry MD Referring Internal Medicine - Pediatrics " [phone]

## 2023-01-28 PROBLEM — U07.1 INFECTION DUE TO 2019 NOVEL CORONAVIRUS: Status: ACTIVE | Noted: 2023-01-01

## 2023-01-28 PROBLEM — R53.1 GENERALIZED WEAKNESS: Status: ACTIVE | Noted: 2023-01-01

## 2023-01-28 NOTE — TELEPHONE ENCOUNTER
Coronavirus (COVID-19) Notification    Caller Name (Patient, parent, daughter/son, grandparent, etc)  [facility] [employer]      Gather patient reported symptoms   Assessment   Current Symptoms at time of phone call, reported by patient: (if no symptoms, document: No symptoms] Coughing, wheezing, body aches, general discomfort   Date of symptom(s) onset (if applicable) 01/26/2023     If at time of call, Patients symptoms have worsened, the Patient should contact 911 or have someone drive them to Emergency Dept promptly:      If Patient calling 911, inform 911 personal that you have tested positive for the Coronavirus (COVID-19).  Place mask on and await 911 to arrive.    If Emergency Dept, If possible, please have another adult drive you to the Emergency Dept but you need to wear mask when in contact with other people.      Treatment Options:   Patient classified as COVID treatment eligible by Epic high risk algorithm: Yes  Is the patient symptomatic at the time of result notification? Yes. Was the onset of symptoms within the last 5 days? Yes.   There are now oral medications available for the treatment of COVID-19.  Taking one of these medications within the first five days of symptoms (when people may not yet feel severely ill) has been shown to make people feel better, prevent them from getting sicker, and preventing hospitalization and death.   Does the patient agree to have a visit with a provider to discuss treatment options? Yes. Is the patient seen at [facility]?  No: Warm transfer caller to [phone] to be scheduled with a virtual urgent provider.  During transfer, instruct  on appropriate time frame for visit     Review information with Patient    Your result was positive. This means you have COVID-19 (coronavirus).    How can I protect others?    These guidelines are for isolating before returning to work, school or .    If you DO have symptoms    Stay home and away from  others     For at least 5 days after your symptoms started, AND    You are fever free for 24 hours (with no medicine that reduces fever), AND    Your other symptoms are better    Wear a mask for 10 full days anytime you are around others    If you DON'T have symptoms    Stay home and away from others for at least 5 days after your positive test    Wear a mask for 10 full days anytime you are around others    There may be different guidelines for healthcare facilities.  Please check with the specific sites before arriving.    If you have been told by a doctor that you were severely ill with COVID-19 or are immunocompromised, you should isolate for at least 10 days.    You should not go back to work until you meet the guidelines above for ending your home isolation. You don't need to be retested for COVID-19 before going back to work--studies show that you won't spread the virus if it's been at least 10 days since your symptoms started (or 20 days, if you have a weak immune system).    Employers, schools, and daycares: This is an official notice for this person's medical guidelines for returning in-person.  They must meet the above guidelines before going back to work, school or  in person.    You will receive a positive COVID-19 letter via netTALK or the mail soon with additional self-care information.    Would you like me to review some of that information with you now?  Yes    How can I take care of myself?      Get lots of rest. Drink extra fluids (unless a doctor has told you not to).      Take Tylenol (acetaminophen) for fever or pain. If you have liver or kidney problems, ask your family doctor if it's okay to take Tylenol.     Take either:     650 mg (two 325 mg pills) every 4 to 6 hours, or     1,000 mg (two 500 mg pills) every 8 hours as needed.     Note: Do not take more than 3,000 mg in one day. Acetaminophen is found in many medicines (both prescribed and over-the-counter medicines). Read all  labels to be sure you don't take too much.    For children, check the Tylenol bottle for the right dose (based on their age or weight).      If you have other health problems (like cancer, heart failure, an organ transplant or severe kidney disease): Call your specialty clinic if you don't feel better in the next 2 days.      Know when to call 911: Emergency warning signs include:    Trouble breathing or shortness of breath    Pain or pressure in the chest that doesn't go away    Feeling confused like you haven't felt before, or not being able to wake up    Bluish-colored lips or face        If you were tested for an upcoming procedure, please contact your provider for next steps.    Yarely Yeboah RN      Reason for Disposition    [1] HIGH RISK for severe COVID complications (e.g., weak immune system, age > 64 years, obesity with BMI of 30 or higher, pregnant, chronic lung disease or other chronic medical condition) AND [2] COVID symptoms (e.g., cough, fever)  (Exceptions: Already seen by PCP and no new or worsening symptoms.)    Additional Information    Negative: SEVERE difficulty breathing (e.g., struggling for each breath, speaks in single words)    Negative: Difficult to awaken or acting confused (e.g., disoriented, slurred speech)    Negative: Bluish (or gray) lips or face now    Negative: Shock suspected (e.g., cold/pale/clammy skin, too weak to stand, low BP, rapid pulse)    Negative: Sounds like a life-threatening emergency to the triager    Negative: SEVERE or constant chest pain or pressure  (Exception: Mild central chest pain, present only when coughing.)    Negative: MODERATE difficulty breathing (e.g., speaks in phrases, SOB even at rest, pulse 100-120)    Negative: Headache and stiff neck (can't touch chin to chest)    Negative: Oxygen level (e.g., pulse oximetry) 90 percent or lower    Negative: Chest pain or pressure  (Exception: MILD central chest pain, present only when coughing)    Negative:  Patient sounds very sick or weak to the triager    Negative: MILD difficulty breathing (e.g., minimal/no SOB at rest, SOB with walking, pulse <100)     SOB not any worse    Negative: Fever > 103 F (39.4 C)    Negative: [1] Fever > 101 F (38.3 C) AND [2] over 60 years of age    Negative: [1] Fever > 100.0 F (37.8 C) AND [2] bedridden (e.g., nursing home patient, CVA, chronic illness, recovering from surgery)    Protocols used: CORONAVIRUS (COVID-19) DIAGNOSED OR [id number]

## 2023-01-28 NOTE — TELEPHONE ENCOUNTER
Reason for Call:  Other other    Detailed comments: patient started to have symptoms of wheezing; cough; and congestion on Thursday.    Patient took a covid testt this AM.  Positive results.    Patient stated exposed to wife whom was positive.    Patient has been transferred to [facility].    Thank you.    Phone Number Patient can be reached at: Cell number on file:    Telephone Information:   Mobile [phone]       Best Time: any    Can we leave a detailed message on this number? YES    Call taken on 1/28/2023 at 8:40 AM by Sena Young

## 2023-01-29 NOTE — ED PROVIDER NOTES
History     Chief Complaint:  Generalized Weakness       HPI   [facility] 82 [facility] who has a history of MI, CHF, ischemic cardiomyopathy, cardiac arrest, cardiogenic shock, CAD, paroxysmal atrial fibrillation, s/p warfarin, and presents with weakness. The patient slid on the floor and had trouble getting up. He appears to be somewhat confused. He feels weak today. He tested for COVID-19 at home today and his test resulted positive. He is not sure if he has felt unwell before today. He states he started an antiviral for COVID-19 today. He denies headaches, chest pain, shortness of breath, abdominal pain, vomiting, diarrhea, or dysuria. He has had a weak appetite.    Independent Historian: spouse    Review of External Notes: reviewed clinic visits from earlier this week  ROS:  Review of Systems   Constitutional: Positive for appetite change.   Respiratory: Negative for shortness of breath.    Cardiovascular: Negative for chest pain.   Gastrointestinal: Negative for abdominal pain, diarrhea and vomiting.   Genitourinary: Negative for dysuria.   Neurological: Positive for weakness. Negative for headaches.   All other systems reviewed and are negative.    Allergies:  Carvedilol  Dorzolamide  Metoprolol     Medications:    albuterol   atorvastatin   azithromycin   bromfenac  bumetanide  carvedilol  clopidogrel   diclofenac   Duloxetine  fluticasone  gabapentin   latanoprost   lisinopril  metoprolol   molnupiravir   ofloxacin   omeprazole   potassium chloride SA   sildenafil   sotalol   timolol  warfarin     Past Medical History:    Contracture of plantar fascia  Esophageal reflux  Hypertension  OA  Glaucoma  Abdominal hernia  Prostate hypertrophy benign  nonrheumatic mitral valve regurgitation  CAD  Cardiac arrest  PAF  Cardiogenic shock  Septic shock  vitreous degeneration  GI bleed  Community acquired pneumonia  MI  Ischemic cardiomyopathy    Past Surgical History:    Colonoscopy  ENT  surgery  Hernia repair  Eye lens replacement  Coronary stent placement  Cataract removal    Family History:    family history includes Breast Cancer in his sister; Cardiovascular in his mother; Family History Negative in his father; Thyroid Disease in his maternal aunt.    Social History:   reports that he quit smoking about 23 years ago. His smoking use included cigarettes. He has never used smokeless tobacco. He reports current alcohol use. He reports that he does not use drugs.  PCP: Tod Callahan     Physical Exam     Patient Vitals for the past 24 hrs:   BP Temp Temp src Pulse Resp SpO2   01/28/23 2305 -- -- -- 61 23 --   01/28/23 2220 -- -- -- 64 15 --   01/28/23 2142 105/66 -- -- 66 13 94 %   01/28/23 1930 -- -- -- -- -- 94 %   01/28/23 1912 121/78 100.4  F (38  C) Temporal 72 20 97 %        Physical Exam  VS: Reviewed per above  HENT: Mucous membranes moist, no nuchal rigidity  EYES: sclera anicteric  CV: Rate as noted, regular rhythm.   RESP: Effort normal. Breath sounds are normal bilaterally.  GI: no tenderness/rebound/guarding, not distended.  NEURO: GCS 15, cranial nerves II through XII are intact, 5 out of 5 strength in all 4 extremities, sensation is intact light touch in all 4 extremities  MSK: No deformity of the extremities  SKIN: Warm and dry    Emergency Department Course   ECG:  ECG taken 1938, ECG read 1956   Atrial sensed ventricular paced rhythm  Abnormal ECG  Paced rhythm new since 10 years ago  Rate 72 bpm. CA interval 130 ms. QRs duration 160 ms. QT/QTc 466/510 ms. P-R-T axes 64 18 45.    Imaging:  XR Chest 2 Views   Final Result   IMPRESSION: Pacemaker with leads over the RA, RV, and coronary sinus. Shallow inspiration. Benign calcified granuloma left midlung. Lungs otherwise clear with no acute new findings.      Head CT w/o contrast   Final Result   IMPRESSION:   1.  No CT finding of a mass, hemorrhage or focal area suggestive of infarct.   2.  Mild age-related changes.         Report  per radiology    Laboratory:  Labs Ordered and Resulted from Time of ED Arrival to Time of ED Departure   COMPREHENSIVE METABOLIC PANEL - Abnormal       Result Value    Sodium 142      Potassium 3.9      Chloride 104      Carbon Dioxide (CO2) 28      Anion Gap 10      Urea Nitrogen 25.0 (*)     Creatinine 1.24 (*)     Calcium 7.7 (*)     Glucose 89      Alkaline Phosphatase 82      AST 33      ALT 56 (*)     Protein Total 5.7 (*)     Albumin 3.7      Bilirubin Total 1.1      GFR Estimate 58 (*)    ROUTINE UA WITH MICROSCOPIC REFLEX TO CULTURE - Abnormal    Color Urine Light Yellow      Appearance Urine Clear      Glucose Urine >=1000 (*)     Bilirubin Urine Negative      Ketones Urine Trace (*)     Specific Gravity Urine 1.018      Blood Urine Small (*)     pH Urine 5.5      Protein Albumin Urine 10 (*)     Urobilinogen Urine Normal      Nitrite Urine Negative      Leukocyte Esterase Urine Negative      Mucus Urine Present (*)     RBC Urine 15 (*)     WBC Urine <1      Hyaline Casts Urine 1     INFLUENZA A/B & SARS-COV2 PCR MULTIPLEX - Abnormal    Influenza A PCR Negative      Influenza B PCR Negative      RSV PCR Negative      SARS CoV2 PCR Positive (*)    INR - Abnormal    INR 3.23 (*)    BLOOD GAS VENOUS WITH OXYHEMOGLOBIN - Abnormal    pH Venous 7.40      pCO2 Venous 54 (*)     pO2 Venous 17 (*)     Bicarbonate Venous 33 (*)     FIO2 21      Oxyhemoglobin Venous 17 (*)     Base Excess/Deficit (+/-) 6.7 (*)    CBC WITH PLATELETS AND DIFFERENTIAL - Abnormal    WBC Count 10.9      RBC Count 4.35 (*)     Hemoglobin 14.4      Hematocrit 45.1       (*)     MCH 33.1 (*)     MCHC 31.9      RDW 14.3      Platelet Count 167      % Neutrophils 85      % Lymphocytes 7      % Monocytes 7      % Eosinophils 0      % Basophils 0      % Immature Granulocytes 1      NRBCs per 100 WBC 0      Absolute Neutrophils 9.4 (*)     Absolute Lymphocytes 0.7 (*)     Absolute Monocytes 0.8      Absolute Eosinophils 0.0      Absolute  Basophils 0.0      Absolute Immature Granulocytes 0.1      Absolute NRBCs 0.0     TROPONIN T, HIGH SENSITIVITY - Normal    Troponin T, High Sensitivity 20        Emergency Department Course & Assessments:    Interventions:  Medications   acetaminophen (TYLENOL) tablet 650 mg (650 mg Oral Given 1/28/23 1957)   ipratropium - albuterol 0.5 mg/2.5 mg/3 mL (DUONEB) neb solution 6 mL (6 mLs Nebulization Given 1/28/23 1957)      Consultations/Discussion of Management or Tests:  1923 I obtained history and examined patient.  1928 I spoke with the patient's wife to discuss the patient's findings, presentation, and plan of care.  2151 I spoke with Marion Lao PA-C for Dr. Morales of the Hospitalist service to discuss the patient's findings, presentation, and plan of care.    Disposition:  Initially planned for admission to the hospital, but later patient changed his mind and was discharged home.    Impression & Plan    Medical Decision Making:  Patient presents to the ER for evaluation of generalized weakness in setting of positive COVID test at home and 1 day of cough and generalized weakness.  On arrival he has temperature 100.4  F.  He is neurologically intact.  His wife tells me that he rolled onto his nightstand and could not get up and family helped him to the ground.  He is confirmed to have COVID on testing here today.  He is not hypoxic.  He did have some decreased breath sounds which improved with DuoNeb treatment.  No signs of myocardial ischemia.  INR is actually supratherapeutic.  No signs of UTI or respiratory acidosis or BETH or concerning leukocytosis.  Ultimately plan for observation admission due to generalized weakness, but patient desired discharge after discussing with hospitalist team.  I had a conference call with patient and his wife over the phone and despite my recommendation to be admitted, patient expressed his desire for discharge.  He understood the risks of falls or progressive illness  leading to morbidity or mortality.  He ambulated independently in the ER.  His wife was agreeable to pick him up from the ER and bring him home.  Recommended interval primary care follow-up.  Return precautions discussed prior to discharge.    Diagnosis:    ICD-10-CM    1. Infection due to 2019 novel coronavirus  U07.1       2. Generalized muscle weakness  M62.81            Scribe Disclosure:  Gunjan GONZALEZ Hired, am serving as a scribe at 7:05 PM on 1/28/2023 to document services personally performed by Duke Peña MD based on my observations and the provider's statements to me.     1/28/2023   Duke Peña MD Lindenbaum, Elan, MD  01/29/23 0111

## 2023-01-29 NOTE — DISCHARGE INSTRUCTIONS

## 2023-01-29 NOTE — ED TRIAGE NOTES
Pt arrives from home via EMS for generalized weakness. Pt slid out of a chair and onto floor, did not fall, but was unable to get himself up. Wife tested covid positive this week and pt started experiencing symptoms on Wed 1/25. Started an antiviral on Thursday. VSS, A&Ox4

## 2023-01-29 NOTE — ED NOTES
Bed: ED40  Expected date:   Expected time:   Means of arrival: Ambulance  Comments:   83 yo male + covid

## 2023-01-29 NOTE — PROGRESS NOTES
[facility]       [facility] 82 [facility] with PMHx significant for chronic systolic heart failure EF 20 to 25%, CAD with ischemic cardiomyopathy, CKD, chronic A. fib anticoagulated on Coumadin status post biventricular ICD in 2014, hypertension, status post mitral clip, peripheral neuropathy, hyperlipidemia, severe pulmonary hypertension and GERD, who was I was asked to admit on 1/28/2023 with generalized weakness due to COVID-19 infection.  He developed symptoms on Thursday, tested positive at home and was started on an antiviral, molnupiravir. He had trouble getting out of bed today and slid to the floor and was unable to get up.  Due to acute weakness, he presented to the ED.  His wife did not feel she could care for him at home with this weakness.  In the ED, he has a low-grade temp of 100.4, vital signs otherwise stable.  He is not hypoxic.  CMP is fairly unremarkable, creatinine is 1.24, BUN 25.  CBC is unremarkable.  UA is unremarkable.  COVID-19 is positive.  Chest x-ray is clear.  CT of the head is negative for acute process.  EKG shows an atrial sensed ventricular paced rhythm.  He received a DuoNeb and oral Tylenol.  Upon my exam, patient feels much improved and wishes to discharge home.  Gait belt was placed on him for safety but he was able to get out of bed on his own walk across the room.  He was able to sit down on a bench and is able to stand back up on his own and then get back into bed on his own.  He seems to be moving around well enough to manage at home.  It is reasonable for him to discharge home at this time given he is not hypoxic and his labs are fairly stable.  He will rediscuss admission with the ED provider and his wife and anticipate he will discharge home.         The patient's care was discussed with the ED provider, Dr. Peña.    Marion Lao PA-C  Hospitalist Service  [facility]  Securely message with  Yvette (more info)  Text page via [facility] Paging/Directory

## 2023-01-30 NOTE — TELEPHONE ENCOUNTER
Per chart review, patient was seen for VV w/ UC r/t COVID and seen in ED 1/28/23. Closing encounter.     Woodrow POOL RN 1/30/2023 at 7:23 AM

## 2023-01-30 NOTE — PROGRESS NOTES
ANTICOAGULATION  MANAGEMENT: Discharge Review    Malcolm HENDERSON [facility] chart reviewed for anticoagulation continuity of care    Emergency room visit on 1/28/23 for Covid-19.    Discharge disposition: Home - was advised for hospital admit but patient declined    Results:    Recent labs: (last 7 days)     01/28/23  1945   INR 3.23*     Anticoagulation inpatient management:     not applicable     Anticoagulation discharge instructions:     Warfarin dosing: home regimen continued   Bridging: No   INR goal change: No      Medication changes affecting anticoagulation: Inquire with patient whether he is taking medications to manage his symptoms - per ED notes, reported starting an antiviral.    Additional factors affecting anticoagulation: Yes: Covid-19 virus     PLAN     No adjustment to anticoagulation plan needed    Recommended follow up is scheduled - in today, 1/30 for INR check. Will receive detailed instructions at this point with updated INR.    No adjustment to Anticoagulation Calendar was required    Cici Reddy RN

## 2023-01-31 NOTE — TELEPHONE ENCOUNTER
ANTICOAGULATION     Malcolm Barker is overdue for INR check.      Left message for patient to call and schedule lab appointment as soon as possible. If returning call, please schedule.     Cici Reddy RN     I will START or STAY ON the medications listed below when I get home from the hospital:    omega fish oil  -- 1000 milligram(s) by mouth once a day  -- Indication: For Need for prophylactic measure    advair  --     -- Indication: For Asthma    ibuprofen 600 mg oral tablet  -- 1 tab(s) by mouth every 4 hours, As needed, headeache  -- Indication: For Headache    aspirin 81 mg oral delayed release tablet  -- 1 tab(s) by mouth once a day  -- Indication: For Need for prophylactic measure    ferrous sulfate 200 mg (65 mg elemental iron) oral tablet  --  by mouth once a day  -- Indication: For Anemia, unspecified type    pantoprazole 40 mg oral delayed release tablet  -- 1 tab(s) by mouth once a day (before a meal)  -- Indication: For Gastroesophageal reflux disease, esophagitis presence not specified    Vitamin D3 400 intl units oral capsule  -- 1 cap(s) by mouth once a day  -- Indication: For Need for prophylactic measure    Vitamin B12 500 mcg oral tablet  -- 1 tab(s) by mouth once a day  -- Indication: For Anemia, unspecified type

## 2023-02-01 NOTE — TELEPHONE ENCOUNTER
General Call      Reason for Call:  INR update    What are your questions or concerns:  Patient called to inform INR nurses that he is COVID positive and will schedule an INR draw when he is feeling better.    Date of last appointment with provider: N/A    Could we [facility] this information to you [facility] or would you prefer to receive a phone call?:   No preference   Okay to leave a detailed message?: No at Home number on file [phone] (home)

## 2023-02-01 NOTE — TELEPHONE ENCOUNTER
Left detailed message advising to schedule INR as soon as able, preferably within the next day or two due to having covid 19.    Regina Yuan RN - [facility]

## 2023-02-03 NOTE — PROGRESS NOTES
ANTICOAGULATION MANAGEMENT     Malcolm HENDERSON [facility] 82 year old male is on warfarin with supratherapeutic INR result. (Goal INR 2.0-3.0)    Recent labs: (last 7 days)     02/03/23  1307   INR 3.3*       ASSESSMENT       Source(s): Chart Review and Patient/Caregiver Call       Warfarin doses taken: Warfarin taken as instructed    Diet: No new diet changes identified    New illness, injury, or hospitalization: Yes: ED 1/28/23 - weakness, COVID+,     Medication/supplement changes: Lagevrio 5 day course (dates: 1/28/23 - 2/2/2/23) No interaction anticipated Patient reports he only took 1 dose of Lagevrio, patient states it made him week and unable to move.    Signs or symptoms of bleeding or clotting: No    Previous INR: Supratherapeutic    Additional findings: None       PLAN     Recommended plan for temporary change(s) affecting INR     Dosing Instructions: partial hold then continue your current warfarin dose with next INR in 2 weeks, suggested 1 week but patient will be out of town      Summary  As of 2/3/2023    Full warfarin instructions:  2/3: 2.5 mg; Otherwise 5 mg every Mon, Wed, Fri; 2.5 mg all other days   Next INR check:  2/15/2023             Telephone call with Jaskaran who agrees to plan and repeated back plan correctly    Lab visit scheduled    Education provided:     Please call back if any changes to your diet, medications or how you've been taking warfarin    Contact [phone]  with any changes, questions or concerns.     Plan made per [facility] anticoagulation protocol    Deb Reddy RN  Anticoagulation Clinic  2/3/2023    _______________________________________________________________________     Anticoagulation Episode Summary     Current INR goal:  2.0-3.0   TTR:  67.5 % (1 y)   Target end date:  Indefinite   Send INR reminders to:  JOHNNIE LACEY    Indications    Long term current use of anticoagulant therapy [Z79.01]  Chronic atrial fibrillation (H) [I48.20]           Comments:            Anticoagulation Care Providers     Provider Role Specialty Phone number    Tod Callahan MD Referring Internal Medicine - Pediatrics [phone]    Leisa Mistry MD Referring Internal Medicine - Pediatrics [phone]

## 2023-02-15 NOTE — PROGRESS NOTES
ANTICOAGULATION MANAGEMENT     Malcolm HENDERSON [facility] 82 year old male is on warfarin with supratherapeutic INR result. (Goal INR 2.0-3.0)    Recent labs: (last 7 days)     02/15/23  1632   INR 3.2*         ASSESSMENT       Source(s): Chart Review and Patient/Caregiver Call       Warfarin doses taken: Warfarin taken as instructed    Diet: No new diet changes identified    New illness, injury, or hospitalization: Yes: Covid positive on 1/27/23. Patient states he recently tested negative and is feeling better.    Medication/supplement changes: None noted    Signs or symptoms of bleeding or clotting: No    Previous INR: Supratherapeutic    Additional findings: patient resumed exercise today, he has not exercised since testing positive for Covid. He plans to resume exercising 3 times per week consisting of light weights and 15 minutes on treadmill.    Since this is 3rd supratherapeutic INR, I did go ahead and reduce warfarin maintenance dose, especially since his exercise is not that vigorous.  Patient having right cornea eye surgery on 3/3/23.     PLAN     Recommended plan for temporary change(s) and ongoing change(s) affecting INR     Dosing Instructions: decrease your warfarin dose (10% change) with next INR in 10 days       Summary  As of 2/15/2023    Full warfarin instructions:  5 mg every Mon, Fri; 2.5 mg all other days   Next INR check:  2/27/2023             Telephone call with Jaskaran who agrees to plan and repeated back plan correctly    Check at provider office visit    Education provided:     Contact [phone]  with any changes, questions or concerns.     Plan made per [facility] anticoagulation protocol    Kaylah Sherman RN  Anticoagulation Clinic  2/15/2023    _______________________________________________________________________     Anticoagulation Episode Summary     Current INR goal:  2.0-3.0   TTR:  66.3 % (11.7 mo)   Target end date:  Indefinite   Send INR reminders to:  [facility]    Indications    Long  term current use of anticoagulant therapy [Z79.01]  Chronic atrial fibrillation (H) [I48.20]           Comments:           Anticoagulation Care Providers     Provider Role Specialty Phone number    Tod Callahan MD Referring Internal Medicine - Pediatrics [phone]    Leisa Mistry MD Referring Internal Medicine - Pediatrics [phone]

## 2023-02-27 PROBLEM — U07.1 INFECTION DUE TO 2019 NOVEL CORONAVIRUS: Status: RESOLVED | Noted: 2023-01-01 | Resolved: 2023-01-01

## 2023-02-27 NOTE — PATIENT INSTRUCTIONS
Stop warfarin and clopidogrel through the time of your surgery    The morning of surgery, TAKE   Metoprolol   Sotalol   Gabapentin   1 tablet of Bumex    SKIP all non-essential medications the morning of surgery    RESTART all medications after your surgery is complete

## 2023-02-28 NOTE — TELEPHONE ENCOUNTER
I spoke to patient, he was seen for pre-op on 2/27/23 for upcoming eye surgery but was not sent to lab for INR.  Also, per Dr. Callahan's note, patient is to hold warfarin and clopidogrel 2/27-03/02/23, patient confirmed that he stopped those medications as instructed.    Follow up INR scheduled for 3/10/23, warfarin calendar updated.    Kaylah Sherman RN  Anticoagulation Clinic

## 2023-03-10 NOTE — PROGRESS NOTES
ANTICOAGULATION MANAGEMENT     Malcolm HENDERSON [facility] 82 year old male is on warfarin with subtherapeutic INR result. (Goal INR 2.0-3.0)    Recent labs: (last 7 days)     03/10/23  1518   INR 1.5*       ASSESSMENT       Source(s): Chart Review and Patient/Caregiver Call       Warfarin doses taken: Held for corneal transplant  recently which may be affecting INR Warfarin was held for 4 days, resumed on 3/3/23, did not have any boost doses     Diet: No new diet changes identified    New illness, injury, or hospitalization: No    Medication/supplement changes: Jardiance started a couple months ago No interaction anticipated    Entresto plans to start within the week No interaction anticipated    Signs or symptoms of bleeding or clotting: No    Previous INR: Supratherapeutic, Warfarin maintenance dose decreased 10% 2/15/23.     Additional findings: None         PLAN     Recommended plan for temporary change(s) affecting INR. Since INR dropped so much, did return patient to previous warfarin maintenance dose. Did discuss with patient that if next INR is supratherapeutic, should probably change to 2.5 mg tablets.      Dosing Instructions: booster dose then Increase your warfarin dose (11.1% change) with next INR in 1-2 weeks       Summary  As of 3/10/2023    Full warfarin instructions:  3/10: 7.5 mg; Otherwise 5 mg every Mon, Wed, Fri; 2.5 mg all other days   Next INR check:  3/22/2023             Telephone call with Jaskaran who agrees to plan and repeated back plan correctly    Lab visit scheduled    Education provided:     Please call back if any changes to your diet, medications or how you've been taking warfarin    Contact [phone]  with any changes, questions or concerns.     Plan made per [facility] anticoagulation protocol    Deb Reddy RN  Anticoagulation Clinic  3/10/2023    _______________________________________________________________________     Anticoagulation Episode Summary     Current INR goal:  2.0-3.0    TTR:  65.0 % (1 y)   Target end date:  Indefinite   Send INR reminders to:  [facility]    Indications    Long term current use of anticoagulant therapy [Z79.01]  Chronic atrial fibrillation (H) [I48.20]           Comments:           Anticoagulation Care Providers     Provider Role Specialty Phone number    Tod Callahan MD Referring Internal Medicine - Pediatrics [phone]    Leisa Mistry MD Referring Internal Medicine - Pediatrics [phone]

## 2023-03-22 NOTE — PROGRESS NOTES
"DILIA Bailey is a 5 year old male here for fever and cough. This started 2 days ago and seemed to get worse today. He has been crying and doesn't want to eat or drink anything. Older brother and dad were sick last week. Kobi was sick the week before with a cough and runny nose.   ?  O  BP (!) 88/56   Pulse 136   Temp 100  F (37.8  C) (Oral)   Resp 20   Ht 1.014 m (3' 3.92\")   Wt 16 kg (35 lb 4 oz)   SpO2 97%   BMI 15.55 kg/m     Vitals reviewed. Nursing note reviewed.  General Appearance: Pleasant and alert, in no acute distress  HEENT: mucous membranes moist. Left ear with red, bulging TM. Right ear normal.   CV: RRR, no murmur, rubs, gallops  Resp: Mild tachypnea. Lungs Clear to auscultation bilaterally. No wheezes, rales, rhonchi  Abd: Soft, nontender, nondistended, bowel sounds present. No masses.  Ext: No peripheral edema, good distal perfusion  Skin: warm, dry, intact, no rash noted  Neuro: no focal deficits, CNs II-XII normal.   Psych: mood and affect are normal.    A/P  Jasson was seen today for well child.    Diagnoses and all orders for this visit:    RSV bronchiolitis: tested positive for RSV. Discussed giving ongoing tylenol and ibuprofen and discussed signs of respiratory distress with his parents.     Fever in other diseases  -     BEHAVIORAL/EMOTIONAL ASSESSMENT (53652)  -     SCREENING TEST, PURE TONE, AIR ONLY  -     SCREENING, VISUAL ACUITY, QUANTITATIVE, BILAT  -     sodium fluoride (VANISH) 5% white varnish 1 packet  -     SC APPLICATION TOPICAL FLUORIDE VARNISH BY [facility]/Q  -     Influenza A & B Antigen - Clinic Collect  -     RSV rapid antigen  -     Symptomatic; Yes; 11/12/2022 COVID-19 Virus (Coronavirus) by PCR Nose    OME (otitis media with effusion), left: Explained this could be viral in origin and parents should give it 1-2 days and treat if he is complaining of ear pain over the next couple of days.   -     amoxicillin (AMOXIL) 400 MG/5ML suspension; Take 7.5 mLs (600 mg) " ANTICOAGULATION MANAGEMENT     Malcolm HENDERSON [facility] 83 year old male is on warfarin with therapeutic INR result. (Goal INR 2.0-3.0)    Recent labs: (last 7 days)     03/22/23  1409   INR 2.2*       ASSESSMENT       Source(s): Chart Review and Patient/Caregiver Call       Warfarin doses taken: Warfarin taken as instructed    Diet: No new diet changes identified    New illness, injury, or hospitalization: No    Medication/supplement changes: Entresto started on 3/15/23 No interaction anticipated    Losartan stopped on 3/15/23 No interaction anticipated    Signs or symptoms of bleeding or clotting: No    Previous INR: Subtherapeutic    Additional findings: None         PLAN     Recommended plan for ongoing change(s) affecting INR     Dosing Instructions: Continue your current warfarin dose with next INR in 3 weeks       Summary  As of 3/22/2023    Full warfarin instructions:  5 mg every Mon, Wed, Fri; 2.5 mg all other days   Next INR check:  4/12/2023             Telephone call with Jaskaran who verbalizes understanding and agrees to plan    Lab visit scheduled    Education provided:     Please call back if any changes to your diet, medications or how you've been taking warfarin    Contact [phone]  with any changes, questions or concerns.     Plan made per [facility] anticoagulation protocol    Deb Reddy RN  Anticoagulation Clinic  3/22/2023    _______________________________________________________________________     Anticoagulation Episode Summary     Current INR goal:  2.0-3.0   TTR:  62.6 % (1 y)   Target end date:  Indefinite   Send INR reminders to:  [facility]    Indications    Long term current use of anticoagulant therapy [Z79.01]  Chronic atrial fibrillation (H) [I48.20]           Comments:           Anticoagulation Care Providers     Provider Role Specialty Phone number    Tod Callahan MD Referring Internal Medicine - Pediatrics [phone]    Leisa Mistry MD Referring Internal Medicine -  Pediatrics [phone]           by mouth 2 times daily for 7 days     I spent over 40 minutes with the family on the visit, counseling, and documentation.     Will reschedule well child check for next week. Focused on acute illness today.   Return in 1 year (on 11/14/2023) for Preventive Care visit.        Options for treatment and follow-up care were reviewed with the patient and/or guardian. Jasson Bailey and/or guardian engaged in the decision making process and verbalized understanding of the options discussed and agreed with the final plan.    Bárbara Contreras MD

## 2023-03-27 NOTE — TELEPHONE ENCOUNTER
ANTICOAGULATION MANAGEMENT:  Medication Refill    Anticoagulation Summary  As of 3/22/2023    Warfarin maintenance plan:  5 mg (5 mg x 1) every Mon, Wed, Fri; 2.5 mg (5 mg x 0.5) all other days   Next INR check:  4/12/2023   Target end date:  Indefinite    Indications    Long term current use of anticoagulant therapy [Z79.01]  Chronic atrial fibrillation (H) [I48.20]             Anticoagulation Care Providers     Provider Role Specialty Phone number    Tod Callahan MD Referring Internal Medicine - Pediatrics [phone]    Leisa Mistry MD Referring Internal Medicine - Pediatrics [phone]          Visit with referring provider/group within last year: Yes    [facility] referral signed within last year: Yes    Malcolm meets all criteria for refill (current [facility] referral, office visit with referring provider/group in last year, lab monitoring up to date or not exceeding 2 weeks overdue). Rx instructions and quantity match patient's current dosing plan. Warfarin 90 day supply with 1 refill granted per [facility] protocol     Kaylah Sherman RN  Anticoagulation Clinic

## 2023-04-12 NOTE — PROGRESS NOTES
ANTICOAGULATION MANAGEMENT     Malcolm HENDERSON [facility] 83 year old male is on warfarin with supratherapeutic INR result. (Goal INR 2.0-3.0)    Recent labs: (last 7 days)     04/12/23  1523   INR 3.1*       ASSESSMENT       Source(s): Chart Review       Warfarin doses taken: Warfarin taken as instructed    Diet: No new diet changes identified    New illness, injury, or hospitalization: No    Medication/supplement changes: None noted    Signs or symptoms of bleeding or clotting: No    Previous INR: Therapeutic last 2(+) visits    Additional findings: None         PLAN     Recommended plan for no diet, medication or health factor changes affecting INR     Dosing Instructions: Continue your current warfarin dose with next INR in 3 weeks when returned from river cruise      Summary  As of 4/12/2023    Full warfarin instructions:  5 mg every Mon, Wed, Fri; 2.5 mg all other days   Next INR check:  5/3/2023             Telephone call with Jaskaran who verbalizes understanding and agrees to plan    Lab visit scheduled    Education provided:     Contact [phone] with any changes, questions or concerns.     Plan made per [facility] anticoagulation protocol    Vasyl Peña RN  Anticoagulation Clinic  4/12/2023    _______________________________________________________________________     Anticoagulation Episode Summary     Current INR goal:  2.0-3.0   TTR:  62.0 % (1 y)   Target end date:  Indefinite   Send INR reminders to:  JOHNNIE LACEY    Indications    Long term current use of anticoagulant therapy [Z79.01]  Chronic atrial fibrillation (H) [I48.20]           Comments:           Anticoagulation Care Providers     Provider Role Specialty Phone number    Tod Callahan MD Referring Internal Medicine - Pediatrics [phone]    Leisa Mistry MD Referring Internal Medicine - Pediatrics [phone]

## 2023-05-02 NOTE — PATIENT INSTRUCTIONS
Thank you for choosing [facility] Podiatry / Foot & Ankle Surgery!    DR RIVERA'S CLINIC:  [facility]   [id number]   [place] [address]      TRIAGE LINE: [phone]  APPOINTMENTS: [phone]  RADIOLOGY: [phone]  SET UP SURGERY: [phone]  PHYSICAL THERAPY: [phone]   FAX NUMBER: [phone]  BILLING QUESTIONS: [phone]       Follow up: 2 weeks    Apply Betadine or iodine to the end of the toe twice a day, once in the morning and once at night, and apply a Band-Aid over the area.  Wear the toe splint at all times when walking.  Do not need to wear this to bed.      FOOT ULCER (WOUND) EDUCATION  Ulceration ofthe foot involves a break or hole in the skin. Skin is our best protection against infection. Skin is quite durable, however, the underlying tissues are fragile. For this reason, the wound is likely to deepen rapidly. Deep wounds usually get infected and require amputation. Prompt healing is therefore essential to avoid limb loss.     Foot ulcers do not heal without intervention. Walking on the foot and living your normal life is not typically compatible with healing the sore. Successful healing will require several months of significant alteration of your daily activities.   Ulcer complications frequently develop. This primarily includes infection of skin, which then spreads deep into your joints, bones and tendons. Spreading infection may travel up your leg and into other parts of your body. Deep infection is usually treated with amputation ofpart ofyour foot or your leg. Signs of infection include fever, chills, nausea, vomiting, erratic blood sugars, local redness, pus, strong odor and localized warmth. Signs of infection should be taken seriously. Prompt evaluation in the clinic or hospital emergency room is required.   Ulcer treatment requires debridement or surgical removal of devitalized tissue. Your doctor will trim away callused, moistened,  unhealthy tissue from the wound surface and margin. This helps to clean the wound and allows proper inspection. Debridement also stimulates healing even though the wound originally appears larger. Expect some bleeding with each debridement. You will be given instruction regarding wound bandaging. This often includes ointment and gauze. Avoid tape directly on the skin. Hand washing is essential since most infections will come from your fingertips. Ulcer care requires a no touch technique. Your fingers should not touch the margin or base of the wound.    HELPFUL HEALING TIPS:  1. Debridement: Getting rid of bad tissue makes way for good tissue to promote healing  2. Addressing Foot Deformities: Hammertoes and bunions can cause increased pressure  3. Pressure Reduction: If pressure remains to the wound, it won't heal  4. Good Pulses: If bloodflow is not getting to the foot, the ulcer will not heal  5. Good Nutrition: If you are not getting proper nutrition your body can't heal.Protein! At least 90g a day.  Supplements are a good way to help get this, such as Julián, Glucerna, Ensure. Also taking 5000units of Vitamin D a day.   6. Infection Control: Keep the ulcer clean with wound cleanser. DO NOT SOAK IT!  7. Moisture Control: Keep edema down and make sure that drainage is getting pulled away from the ulcer    IMPORTANCE OF DEBRIDEMENT   Reduces bioburden to help control or reduce infection. Even if an ulcer is not  infected,  the bacterial bioburden causes increased local inflammation.   Allows more accurate visualization of the wound base and edges, which allows for more precise staging.   Removes necrotic/non-viable tissue, which impedes wound healing, causes protein loss and can be a nidus for infection.   Stimulates new circulation (angiogenesis) and allows adequate oxygen delivery to the wound.   Removes undermining and tunneling, and may help reduce abscess formation.   Releases healing growth factors at the edge  of the wound.   Prepares the wound bed by leaving only tissues that are capable of regenerating.    www.pedifix.com or call 1-646-PEDIFIX  HAMMERTOE PADS / TOE SPLINTS      HAMMERTOES  Hammertoe is a contracture (bending) of one or both joints of the second, third, fourth, or fifth (little) toes. This abnormal bending can put pressure on the toe when wearing shoes, causing problems to develop.  Hammertoes usually start out as mild deformities and get progressively worse over time. In the earlier stages, hammertoes are flexible and the symptoms can often be managed with noninvasive measures. But if left untreated, hammertoes can become more rigid and will not respond to non-surgical treatment.  Because of the progressive nature of hammertoes, they should receive early attention. Hammertoes never get better without some kind of intervention.  CAUSES  The most common cause of hammertoe is a muscle/tendon imbalance. This imbalance, which leads to a bending of the toe, results from mechanical (structural) changes in the foot that occur over time in some people.  Hammertoes may be aggravated by shoes that don t fit properly. A hammertoe may result if a toe is too long and is forced into a cramped position when a tight shoe is worn.  Occasionally, hammertoe is the result of an earlier trauma to the toe. In some people, hammertoes are inherited.  SYMPTOMS  Pain or irritation of the affected toe when wearing shoes.   Corns and calluses (a buildup of skin) on the toe, between two toes, or on the ball of the foot. Corns are caused by constant friction against the shoe. They may be soft or hard, depending upon their location.   Inflammation, redness, or a burning sensation   Contracture of the toe   In more severe cases of hammertoe, open sores may form.   DIAGNOSIS  Although hammertoes are readily apparent, to arrive at a diagnosis the foot and ankle surgeon will obtain a thorough history of your symptoms and examine your  foot. During the physical examination, the doctor may attempt to reproduce your symptoms by manipulating your foot and will study the contractures of the toes. In addition, the foot and ankle surgeon may take x-rays to determine the degree of the deformities and assess any changes that may have occurred.   Hammertoes are progressive - they don t go away by themselves and usually they will get worse over time. However, not all cases are alike - some hammertoes progress more rapidly than others. Once your foot and ankle surgeon has evaluated your hammertoes, a treatment plan can be developed that is suited to your needs.  NON-SURGICAL TREATMENT  There is a variety of treatment options for hammertoe. The treatment your foot and ankle surgeon selects will depend upon the severity of your hammertoe and other factors.  A number of non-surgical measures can be undertaken:  Padding corns and calluses. Your foot and ankle surgeon can provide or prescribe pads designed to shield corns from irritation. If you want to try over-the-counter pads, avoid the medicated types. Medicated pads are generally not recommended because they may contain a small amount of acid that can be harmful. Consult your surgeon about this option.   Changes in shoewear. Avoid shoes with pointed toes, shoes that are too short, or shoes with high heels - conditions that can force your toe against the front of the shoe. Instead, choose comfortable shoes with a deep, roomy toe box and heels no higher than two inches.   Orthotic devices. A custom orthotic device placed in your shoe may help control the muscle/tendon imbalance.   Injection therapy. Corticosteroid injections are sometimes used to ease pain and inflammation caused by hammertoe.   Medications. Oral nonsteroidal anti-inflammatory drugs (NSAIDs), such as ibuprofen, may be recommended to reduce pain and inflammation.   Splinting/strapping. Splints or small straps may be applied by the surgeon to  realign the bent toe.   Exercises:   1. The Toe Tap  Stand flat on the ground in your bare feet. Raise all of your toes up off the ground as high as you can. Then starting with the little toes, slowly press them down to the ground. After the big toes are on the ground, start over by raising all of them up off the ground again. This motion is similar to tapping your fingers on a desk. Repeat this ten times.     2. Interlocking your Fingers and Toes  Cross your right foot over your knee and place the fingers of your left hand between your toes. Squeeze your toes together, pinching your fingers between them. Spread the toes apart and squeeze them together again. Repeat this ten times then do the other foot. Like most exercises, this will get easier the more you do it. If you are having a lot of difficulty with this exercise, start with just your index finger between your first and second toe, then later add your middle finger between your second and third toes, and so on until you can fit all your fingers between your toes. Do this ten times on each foot. Eventually you will be able to spread your toes apart without using your fingers.    3. Gripping the Floor   the floor by pressing the pads of your toes (not the tips) into the floor without curling your toes. Relax and repeat this ten times. If your shoes have the proper amount of depth, you should be able to do this with shoes on.    HAMMERTOE TOE SURGERY   Hammertoe surgery is complex. The surgical procedure is an attempt to help the toe lay in a better position. Nearly every structure in the toe will be cut including the tendons, ligaments, skin and bone. Hammertoes are a complex deformity and final toe position is difficult to predict. The only sure way to position a toe is to fuse all three digital joints. That will not happen as some degree of toe motion is needed for walking. The toe may not be completely reduced as the surrounding skin and other  structures may not allow the toe to return to a normal position. The tendons on adjacent toes may need to be cut at the time of the original or subsequent surgeries, as interconnections exist between the toes. The toe may drift after surgery. Stiffness may develop leading to new areas of pressure.   Future shoe choices will be critical in allowing the surgery to provide comfort. The toes will still hurt if shoes rub. The original pain may also persist as other foot problems may be contributing to the current pain. The toe may or may not be pinned in place. External pins would require complete avoidance of water on the foot for six weeks. The pin would be removed about six weeks after the surgery. Strict attention to protection is critical. The pin could get bumped or loosen resulting in early removal. Removal might be necessary before the bone heals which would negatively affect the final surgical outcome and toe allignment.

## 2023-05-02 NOTE — LETTER
5/2/2023         RE: Malcolm Barker  [address] [place] [address]        Dear Colleague,    Thank you for referring your patient, Malcolm Barker, to the [facility]. Please see a copy of my visit note below.    PATIENT HISTORY:   Malcolm Barker is a 83 year old male who presents to clinic for redness and bleeding from the right 2nd toe.  He recently just got back from a [place] cruise from [place] area.  He states that the toe has been bleeding for about 2 weeks.  He noticed it started to get red a few days ago.  He has numbness to the feet so its not really painful.  Denies fever, nausea, vomiting.  Wondering if the toe is infected.  Denies specific injury.    Review of Systems:  Patient denies fever, chills, rash, wound, stiffness, limping, weakness, heart burn, blood in stool, chest pain with activity, calf pain when walking, shortness of breath with activity, chronic cough, easy bleeding/bruising, swelling of ankles, excessive thirst, fatigue, depression, anxiety.  Patient admits to numbness.     PAST MEDICAL HISTORY:   Past Medical History:   Diagnosis Date     Contracture of palmar fascia     both hands, mild     Esophageal reflux      Essential hypertension, benign      Generalized osteoarthrosis, unspecified site     L shoulder, r hip..  improved now with exercise, glucosamine     Glaucoma      Hernia, abdominal      Hypertrophy (benign) of prostate     mild slowing     Infection due to 2019 novel coronavirus 1/28/2023        PAST SURGICAL HISTORY:   Past Surgical History:   Procedure Laterality Date     [facility]     COLONOSCOPY  4/24/2013    Procedure: COLONOSCOPY;  Colonoscopy        ENT SURGERY      T&A as a child     EYE SURGERY      lright eye lens replacement     HERNIA REPAIR       IMPLANT AUTOMATIC IMPLANTABLE CARDIOVERTER DEFIBRILLATOR  2/2014     [facility] NONSPECIFIC PROCEDURE  5/04     colonoscopy      [facility] NONSPECIFIC PROCEDURE  1993    R hernia        MEDICATIONS:   Current Outpatient Medications:      acetaminophen (TYLENOL) 500 MG tablet, Take 1-2 tablets (500-1,000 mg) by mouth every 4 hours as needed for mild pain, Disp: 30 tablet, Rfl: 0     albuterol (PROAIR HFA/PROVENTIL HFA/VENTOLIN HFA) 108 (90 Base) MCG/ACT inhaler, Inhale 1-2 puffs into the lungs, Disp: , Rfl:      atorvastatin (LIPITOR) 40 MG tablet, Take 1 tablet by mouth daily, Disp: , Rfl:      bromfenac (BROMDAY) 0.09 % ophthalmic solution, Apply 1 drop to eye every 24 hours, Disp: , Rfl:      Bromfenac Sodium (BROMDAY) 0.09 % SOLN, , Disp: , Rfl:      bumetanide (BUMEX) 1 MG tablet, Take 1 mg by mouth daily 2 in PM, Disp: , Rfl:      cholecalciferol (VITAMIN D3) 25 mcg (1000 units) capsule, Take 1 capsule by mouth every 24 hours, Disp: , Rfl:      clopidogrel (PLAVIX) 75 MG tablet, Take 1 tablet by mouth daily, Disp: , Rfl:      Coenzyme Q10 (COQ10) 200 MG CAPS, Take by mouth daily, Disp: , Rfl:      diclofenac (VOLTAREN) 1 % topical gel, Apply topically 4 times daily, Disp: , Rfl:      DULoxetine (CYMBALTA) 30 MG capsule, Take 1 capsule by mouth twice daily, Disp: 180 capsule, Rfl: 3     fluticasone (FLONASE) 50 MCG/ACT nasal spray, Spray 1-2 sprays into both nostrils daily, Disp: 1 Package, Rfl: 11     gabapentin (NEURONTIN) 300 MG capsule, Take 2 capsules (600 mg) by mouth 4 times daily, Disp: 720 capsule, Rfl: 3     Glucosamine-Chondroit-Vit C-Mn (GLUCOSAMINE CHONDR 1500 COMPLX PO), Take by mouth daily, Disp: , Rfl:      latanoprost (XALATAN) 0.005 % ophthalmic solution, , Disp: , Rfl:      lisinopril (ZESTRIL) 5 MG tablet, TAKE 1 TABLET BY MOUTH ONCE DAILY AT BEDTIME, Disp: , Rfl:      Magnesium Oxide 250 MG TABS, Take by mouth daily, Disp: , Rfl:      metoprolol succinate ER (TOPROL XL) 25 MG 24 hr tablet, Take 1 tablet (25 mg) by mouth daily 2 tablets daily, Disp: , Rfl:      Multiple Vitamin (MULTIVITAMIN OR), Take  by  mouth., Disp: , Rfl:      ofloxacin (OCUFLOX) 0.3 % ophthalmic solution, , Disp: , Rfl:      omeprazole (PRILOSEC) 20 MG CR capsule, , Disp: , Rfl:      potassium chloride SA (K-DUR/KLOR-CON M) 20 MEQ CR tablet, Take 1 tablet twice daily. Take an additional 20 meq on days of Metolazone per Maria E Johnson 14, Disp: , Rfl:      sotalol (BETAPACE) 80 MG tablet, Take 80 mg by mouth every 12 hours, Disp: , Rfl:      tadalafil (CIALIS) 10 MG tablet, Take 1 tablet (10 mg) by mouth daily as needed (ED), Disp: 6 tablet, Rfl: 11     timolol (TIMOPTIC) 0.5 % ophthalmic solution, Place 1 drop into the right eye daily, Disp: , Rfl:      warfarin ANTICOAGULANT (COUMADIN) 5 MG tablet, TAKE 1 TABLET BY MOUTH ON MONDAY, WEDNESDAY AND FRIDAY AND  ONE-HALF  TABLET  ALL  OTHER  DAYS  OR  PER  INR  CLINIC, Disp: 78 tablet, Rfl: 1     ALLERGIES:    Allergies   Allergen Reactions     Carvedilol Other (See Comments)     fatigue     Dorzolamide Other (See Comments)     Other reaction(s): Unknown/Not Verified  Red irritated eye  Red irritated eye       Metoprolol Other (See Comments)     Other reaction(s): Hypotension        SOCIAL HISTORY:   Social History     Socioeconomic History     Marital status:      Spouse name: Errol     Number of children: 2     Years of education: Not on file     Highest education level: Not on file   Occupational History     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 Not Asked      Service Not Asked     Blood Transfusions Not  Asked     Caffeine Concern No     Occupational Exposure No     Hobby Hazards No     Sleep Concern No     Stress Concern No     Weight Concern No     Special Diet No     Back Care No     Exercise Yes     Comment: 4 x week     Bike Helmet Not Asked     Seat Belt Yes     Self-Exams Not Asked   Social History Narrative     Not on file     Social Determinants of Health     Financial Resource Strain: Low Risk  (2/27/2023)    Overall Financial Resource Strain (CARDIA)      Difficulty of Paying Living Expenses: Not very hard   Food Insecurity: No Food Insecurity (2/27/2023)    Hunger Vital Sign      Worried About Running Out of Food in the Last Year: Never true      Ran Out of Food in the Last Year: Never true   Transportation Needs: No Transportation Needs (2/27/2023)    PRAPARE - Transportation      Lack of Transportation (Medical): No      Lack of Transportation (Non-Medical): No   Physical Activity: Sufficiently Active (2/27/2023)    Exercise Vital Sign      Days of Exercise per Week: 3 days      Minutes of Exercise per Session: 50 min   Stress: No Stress Concern Present (2/27/2023)    [place] of Occupational Health - Occupational Stress Questionnaire      Feeling of Stress : Not at all   Social Connections: Unknown (2/27/2023)    Social Connection and Isolation Panel [NHANES]      Frequency of Communication with Friends and Family: More than three times a week      Frequency of Social Gatherings with Friends and Family: Not on file      Attends Temple Services: Patient refused      Active Member of Clubs or Organizations: No      Attends Club or Organization Meetings: Not on file      Marital Status:    Intimate Partner Violence: Not on file   Housing Stability: Low Risk  (2/27/2023)    Housing Stability Vital Sign      Unable to Pay for Housing in the Last Year: No      Number of Places Lived in the Last Year: 1      Unstable Housing in the Last Year: No        FAMILY HISTORY:   Family History    Problem Relation Age of Onset     Cardiovascular Mother          /mi     Breast Cancer Sister      Thyroid Disease Maternal Aunt      Family History Negative Father          from accidental death        EXAM:Vitals: /60       General appearance: Patient is alert and fully cooperative with history & exam.  No sign of distress is noted during the visit.     Psychiatric: Affect is pleasant & appropriate.  Patient appears motivated to improve health.     Respiratory: Breathing is regular & unlabored while sitting.     HEENT: Hearing is intact to spoken word.  Speech is clear.  No gross evidence of visual impairment that would impact ambulation.     Dermatologic: Full-thickness stage III ulceration to the distal aspect of the right second toe.  This measures 0.7 x 0.7 x 0.1 cm.  Base of the wound is granular.  There are some redness to the medial aspect of the toe.  No purulent drainage noted.     Vascular: DP & PT pulses are intact & regular bilaterally.  No significant edema or varicosities noted.  CFT and skin temperature is normal to both lower extremities.     Neurologic: Lower extremity sensation is diminished to feet.      Musculoskeletal: Patient is ambulatory without assistive device or brace.  Semirigid contracture of right second toe at the DIPJ    Radiographs: Right foot x-ray-  I personally reviewed the xrays. -No evidence of bone infection at the distal aspect of the second toe.  No soft tissue gas noted.     ASSESSMENT:    Right foot pain  Ulcer of right second toe with fat layer exposed (H)  Idiopathic progressive neuropathy  Cellulitis of right toe       Medical Decision Making/Plan:  Reviewed patient's chart in [facility]. Reviewed and discussed causes of hammertoes with patient.  Explained that this can be caused by an overpowering of muscles or by the way we walk.  Discussed conservative treatments such as orthotics, pads, shoe gear.  Explained that sometimes the flexor tendons can be  cut to try and straighten the toe and reduce rubbing. This is normally done in office and patient is weight bearing in postop she for 1-2 weeks.  We also discussed surgical intervention to remove the joint and possibly fuse the toe.  Normally patient has a pin sticking out of the toe for about 6 weeks and can not get the foot wet. Patient would have to be minimal weight bearing in cam boot.      Discussed that because of his neuropathy and the hammertoe that the ulcer has developed.  This was debrided.  Please see procedure note below.  We will have him apply iodine to the area twice a day with a Band-Aid.  We will also have him wear a toe crest pad to try to take pressure off of the tip of the toe to try to help with healing.  We talked about possibly a tenotomy at some to try to help decrease pressure and try to prevent this from the ulcerating.  We will get baseline x-rays today and start him on oral Keflex.  We will have him follow-up in 2 weeks for reassessment.  All questions were answered to patient's satisfaction and he will call further questions or concerns.    Procedure: After verbal consent, excisional debridement was performed on ulcer.  #15 blade was used to debride ulcer down to and including subcutaneous tissue. Bleeding controlled with light pressure.   No drainage noted.  No anesthesia was used due to neuropathy. Dry dressing applied to foot.  Patient tolerated procedure well.      Patient risk factor: Patient is at medium risk for infection.        Ashleigh Chavez DPM, Podiatry/Foot and Ankle Surgery        Again, thank you for allowing me to participate in the care of your patient.        Sincerely,        Ashleigh Chavez DPM, Podiatry/Foot and Ankle Surgery

## 2023-05-02 NOTE — PROGRESS NOTES
PATIENT HISTORY:   Malcolm Barker is a 83 year old male who presents to clinic for redness and bleeding from the right 2nd toe.  He recently just got back from a [place] cruise from [place] area.  He states that the toe has been bleeding for about 2 weeks.  He noticed it started to get red a few days ago.  He has numbness to the feet so its not really painful.  Denies fever, nausea, vomiting.  Wondering if the toe is infected.  Denies specific injury.    Review of Systems:  Patient denies fever, chills, rash, wound, stiffness, limping, weakness, heart burn, blood in stool, chest pain with activity, calf pain when walking, shortness of breath with activity, chronic cough, easy bleeding/bruising, swelling of ankles, excessive thirst, fatigue, depression, anxiety.  Patient admits to numbness.     PAST MEDICAL HISTORY:   Past Medical History:   Diagnosis Date     Contracture of palmar fascia     both hands, mild     Esophageal reflux      Essential hypertension, benign      Generalized osteoarthrosis, unspecified site     L shoulder, r hip..  improved now with exercise, glucosamine     Glaucoma      Hernia, abdominal      Hypertrophy (benign) of prostate     mild slowing     Infection due to 2019 novel coronavirus 1/28/2023        PAST SURGICAL HISTORY:   Past Surgical History:   Procedure Laterality Date     [facility]     COLONOSCOPY  4/24/2013    Procedure: COLONOSCOPY;  Colonoscopy        ENT SURGERY      T&A as a child     EYE SURGERY      lright eye lens replacement     HERNIA REPAIR       IMPLANT AUTOMATIC IMPLANTABLE CARDIOVERTER DEFIBRILLATOR  2/2014     [facility] NONSPECIFIC PROCEDURE  5/04    colonoscopy      [facility] NONSPECIFIC PROCEDURE  1993    R hernia        MEDICATIONS:   Current Outpatient Medications:      acetaminophen (TYLENOL) 500 MG tablet, Take 1-2 tablets (500-1,000 mg) by mouth every 4 hours as needed for mild pain, Disp: 30 tablet, Rfl: 0      albuterol (PROAIR HFA/PROVENTIL HFA/VENTOLIN HFA) 108 (90 Base) MCG/ACT inhaler, Inhale 1-2 puffs into the lungs, Disp: , Rfl:      atorvastatin (LIPITOR) 40 MG tablet, Take 1 tablet by mouth daily, Disp: , Rfl:      bromfenac (BROMDAY) 0.09 % ophthalmic solution, Apply 1 drop to eye every 24 hours, Disp: , Rfl:      Bromfenac Sodium (BROMDAY) 0.09 % SOLN, , Disp: , Rfl:      bumetanide (BUMEX) 1 MG tablet, Take 1 mg by mouth daily 2 in PM, Disp: , Rfl:      cholecalciferol (VITAMIN D3) 25 mcg (1000 units) capsule, Take 1 capsule by mouth every 24 hours, Disp: , Rfl:      clopidogrel (PLAVIX) 75 MG tablet, Take 1 tablet by mouth daily, Disp: , Rfl:      Coenzyme Q10 (COQ10) 200 MG CAPS, Take by mouth daily, Disp: , Rfl:      diclofenac (VOLTAREN) 1 % topical gel, Apply topically 4 times daily, Disp: , Rfl:      DULoxetine (CYMBALTA) 30 MG capsule, Take 1 capsule by mouth twice daily, Disp: 180 capsule, Rfl: 3     fluticasone (FLONASE) 50 MCG/ACT nasal spray, Spray 1-2 sprays into both nostrils daily, Disp: 1 Package, Rfl: 11     gabapentin (NEURONTIN) 300 MG capsule, Take 2 capsules (600 mg) by mouth 4 times daily, Disp: 720 capsule, Rfl: 3     Glucosamine-Chondroit-Vit C-Mn (GLUCOSAMINE CHONDR 1500 COMPLX PO), Take by mouth daily, Disp: , Rfl:      latanoprost (XALATAN) 0.005 % ophthalmic solution, , Disp: , Rfl:      lisinopril (ZESTRIL) 5 MG tablet, TAKE 1 TABLET BY MOUTH ONCE DAILY AT BEDTIME, Disp: , Rfl:      Magnesium Oxide 250 MG TABS, Take by mouth daily, Disp: , Rfl:      metoprolol succinate ER (TOPROL XL) 25 MG 24 hr tablet, Take 1 tablet (25 mg) by mouth daily 2 tablets daily, Disp: , Rfl:      Multiple Vitamin (MULTIVITAMIN OR), Take  by mouth., Disp: , Rfl:      ofloxacin (OCUFLOX) 0.3 % ophthalmic solution, , Disp: , Rfl:      omeprazole (PRILOSEC) 20 MG CR capsule, , Disp: , Rfl:      potassium chloride SA (K-DUR/KLOR-CON M) 20 MEQ CR tablet, Take 1 tablet twice daily. Take an additional 20 meq on  days of Metolazone per Maria E Johnson 14, Disp: , Rfl:      sotalol (BETAPACE) 80 MG tablet, Take 80 mg by mouth every 12 hours, Disp: , Rfl:      tadalafil (CIALIS) 10 MG tablet, Take 1 tablet (10 mg) by mouth daily as needed (ED), Disp: 6 tablet, Rfl: 11     timolol (TIMOPTIC) 0.5 % ophthalmic solution, Place 1 drop into the right eye daily, Disp: , Rfl:      warfarin ANTICOAGULANT (COUMADIN) 5 MG tablet, TAKE 1 TABLET BY MOUTH ON MONDAY, WEDNESDAY AND FRIDAY AND  ONE-HALF  TABLET  ALL  OTHER  DAYS  OR  PER  INR  CLINIC, Disp: 78 tablet, Rfl: 1     ALLERGIES:    Allergies   Allergen Reactions     Carvedilol Other (See Comments)     fatigue     Dorzolamide Other (See Comments)     Other reaction(s): Unknown/Not Verified  Red irritated eye  Red irritated eye       Metoprolol Other (See Comments)     Other reaction(s): Hypotension        SOCIAL HISTORY:   Social History     Socioeconomic History     Marital status:      Spouse name: Errol     Number of children: 2     Years of education: Not on file     Highest education level: Not on file   Occupational History     Occupation: 3M     Comment: retired    Tobacco Use     Smoking status: Former     Years: 3.00     Types: Cigarettes     Quit date: 1999     Years since quittin.1     Smokeless tobacco: Never     Tobacco comments:     quit approx    Vaping Use     Vaping status: Not on file   Substance and Sexual Activity     Alcohol use: Yes     Comment: 2 drinks daily     Drug use: No     Sexual activity: Yes     Partners: Female   Other Topics Concern     Parent/sibling w/ CABG, MI or angioplasty before 65F 55M? Not Asked      Service Not Asked     Blood Transfusions Not Asked     Caffeine Concern No     Occupational Exposure No     Hobby Hazards No     Sleep Concern No     Stress Concern No     Weight Concern No     Special Diet No     Back Care No     Exercise Yes     Comment: 4 x week     Bike Helmet Not Asked     Seat Belt Yes      Self-Exams Not Asked   Social History Narrative     Not on file     Social Determinants of Health     Financial Resource Strain: Low Risk  (2023)    Overall Financial Resource Strain (CARDIA)      Difficulty of Paying Living Expenses: Not very hard   Food Insecurity: No Food Insecurity (2023)    Hunger Vital Sign      Worried About Running Out of Food in the Last Year: Never true      Ran Out of Food in the Last Year: Never true   Transportation Needs: No Transportation Needs (2023)    PRAPARE - Transportation      Lack of Transportation (Medical): No      Lack of Transportation (Non-Medical): No   Physical Activity: Sufficiently Active (2023)    Exercise Vital Sign      Days of Exercise per Week: 3 days      Minutes of Exercise per Session: 50 min   Stress: No Stress Concern Present (2023)    [place] of Occupational Health - Occupational Stress Questionnaire      Feeling of Stress : Not at all   Social Connections: Unknown (2023)    Social Connection and Isolation Panel [NHANES]      Frequency of Communication with Friends and Family: More than three times a week      Frequency of Social Gatherings with Friends and Family: Not on file      Attends Denominational Services: Patient refused      Active Member of Clubs or Organizations: No      Attends Club or Organization Meetings: Not on file      Marital Status:    Intimate Partner Violence: Not on file   Housing Stability: Low Risk  (2023)    Housing Stability Vital Sign      Unable to Pay for Housing in the Last Year: No      Number of Places Lived in the Last Year: 1      Unstable Housing in the Last Year: No        FAMILY HISTORY:   Family History   Problem Relation Age of Onset     Cardiovascular Mother          /mi     Breast Cancer Sister      Thyroid Disease Maternal Aunt      Family History Negative Father          from accidental death        EXAM:Vitals: /60       General appearance: Patient  is alert and fully cooperative with history & exam.  No sign of distress is noted during the visit.     Psychiatric: Affect is pleasant & appropriate.  Patient appears motivated to improve health.     Respiratory: Breathing is regular & unlabored while sitting.     HEENT: Hearing is intact to spoken word.  Speech is clear.  No gross evidence of visual impairment that would impact ambulation.     Dermatologic: Full-thickness stage III ulceration to the distal aspect of the right second toe.  This measures 0.7 x 0.7 x 0.1 cm.  Base of the wound is granular.  There are some redness to the medial aspect of the toe.  No purulent drainage noted.     Vascular: DP & PT pulses are intact & regular bilaterally.  No significant edema or varicosities noted.  CFT and skin temperature is normal to both lower extremities.     Neurologic: Lower extremity sensation is diminished to feet.      Musculoskeletal: Patient is ambulatory without assistive device or brace.  Semirigid contracture of right second toe at the DIPJ    Radiographs: Right foot x-ray-  I personally reviewed the xrays. -No evidence of bone infection at the distal aspect of the second toe.  No soft tissue gas noted.     ASSESSMENT:    Right foot pain  Ulcer of right second toe with fat layer exposed (H)  Idiopathic progressive neuropathy  Cellulitis of right toe       Medical Decision Making/Plan:  Reviewed patient's chart in [facility]. Reviewed and discussed causes of hammertoes with patient.  Explained that this can be caused by an overpowering of muscles or by the way we walk.  Discussed conservative treatments such as orthotics, pads, shoe gear.  Explained that sometimes the flexor tendons can be cut to try and straighten the toe and reduce rubbing. This is normally done in office and patient is weight bearing in postop she for 1-2 weeks.  We also discussed surgical intervention to remove the joint and possibly fuse the toe.  Normally patient has a pin sticking out of  the toe for about 6 weeks and can not get the foot wet. Patient would have to be minimal weight bearing in cam boot.      Discussed that because of his neuropathy and the hammertoe that the ulcer has developed.  This was debrided.  Please see procedure note below.  We will have him apply iodine to the area twice a day with a Band-Aid.  We will also have him wear a toe crest pad to try to take pressure off of the tip of the toe to try to help with healing.  We talked about possibly a tenotomy at some to try to help decrease pressure and try to prevent this from the ulcerating.  We will get baseline x-rays today and start him on oral Keflex.  We will have him follow-up in 2 weeks for reassessment.  All questions were answered to patient's satisfaction and he will call further questions or concerns.    Procedure: After verbal consent, excisional debridement was performed on ulcer.  #15 blade was used to debride ulcer down to and including subcutaneous tissue. Bleeding controlled with light pressure.   No drainage noted.  No anesthesia was used due to neuropathy. Dry dressing applied to foot.  Patient tolerated procedure well.      Patient risk factor: Patient is at medium risk for infection.        Ashleigh Chavez DPM, Podiatry/Foot and Ankle Surgery

## 2023-05-04 NOTE — TELEPHONE ENCOUNTER
[facility] Call Center    Phone Message    May a detailed message be left on voicemail: yes     Reason for Call: Other: Patient is requesting a call back to know where he could obtain some Pediflix hammer toe pads?      Said he tried finding them @ [employer], [facility], etc.    Action Taken: Message routed to:  [facility]    Travel Screening: Not Applicable

## 2023-05-04 NOTE — TELEPHONE ENCOUNTER
Called and informed patient that he can get them on the pedifix website, [employer], or our [facility] and [facility] pharmacies.

## 2023-05-12 NOTE — PROGRESS NOTES
ANTICOAGULATION MANAGEMENT     Malcolm HENDERSON [facility] 83 year old male is on warfarin with supratherapeutic INR result. (Goal INR 2.0-3.0)    Recent labs: (last 7 days)     05/12/23  1312   INR 3.1*       ASSESSMENT       Source(s): Chart Review and Patient/Caregiver Call       Warfarin doses taken: Warfarin taken as instructed    Diet: A couple weeks ago, returned from trip and had some illness - vomiting and did not eat for a week. Even with this finding, still has had time to recover and likely is in need of a dosing reduction with last INR also being supra.      Medication/supplement changes: None noted    New illness, injury, or hospitalization: No    Signs or symptoms of bleeding or clotting: No    Previous result: Supratherapeutic    Additional findings: None         PLAN     Recommended plan for temporary change(s) affecting INR     Dosing Instructions: decrease your warfarin dose (10% change) with next INR in 2 weeks       Summary  As of 5/12/2023    Full warfarin instructions:  5 mg every Mon, Fri; 2.5 mg all other days   Next INR check:  5/26/2023             Telephone call with Jaskaran who verbalizes understanding and agrees to plan    Lab visit scheduled    Education provided:     Please call back if any changes to your diet, medications or how you've been taking warfarin    Dietary considerations: importance of notifying [facility] to changes in diet    Plan made per [facility] anticoagulation protocol    Cici Reddy RN  Anticoagulation Clinic  5/12/2023    _______________________________________________________________________     Anticoagulation Episode Summary     Current INR goal:  2.0-3.0   TTR:  53.8 % (1 y)   Target end date:  Indefinite   Send INR reminders to:  BERNADETTEAG DEEPTHI    Indications    Long term current use of anticoagulant therapy [Z79.01]  Chronic atrial fibrillation (H) [I48.20]           Comments:           Anticoagulation Care Providers     Provider Role Specialty Phone number     Tod Callahan MD Referring Internal Medicine - Pediatrics [phone]    Leisa Mistry MD Referring Internal Medicine - Pediatrics [phone]

## 2023-05-12 NOTE — PROGRESS NOTES
ANTICOAGULATION CLINIC REFERRAL RENEWAL REQUEST       An annual renewal order is required for all patients referred to [facility] Anticoagulation Clinic.?  Please review and sign the pended referral order for Malcolm Barker.       ANTICOAGULATION SUMMARY      Warfarin indication(s)   Atrial Fibrillation    Mechanical heart valve present?  NO       Current goal range   INR: 2.0-3.0     Goal appropriate for indication? Goal INR 2-3, standard for indication(s) above     Time in Therapeutic Range (TTR)  (Goal > 60%) 53.8%       Office visit with referring provider's group within last year yes on 2/27/23       Cici Reddy RN  [facility] Anticoagulation Clinic

## 2023-05-16 NOTE — LETTER
5/16/2023         RE: Malcolm Barker  [address] [place] [address]        Dear Colleague,    Thank you for referring your patient, Malcolm Barker, to the [facility] PODIATRY. Please see a copy of my visit note below.    Podiatry / Foot and Ankle Surgery Progress Note    May 16, 2023    Subject: Patient was seen for right 2nd toe.  he has been doing iodine to the toe daily.   He has numbness to the feet so its not really painful.  Denies fever, nausea, vomiting.    Denies specific injury.    Objective:  Vitals: /68   Wt 93.9 kg (207 lb)   BMI 28.87 kg/m    BMI= Body mass index is 28.87 kg/m .    General:  Patient is alert and orientated.  NAD.    Dermatologic: Full-thickness stage III ulceration to the distal aspect of the right second toe.  This measures 0.4 x 0.4 x 0.1 cm.  Base of the wound is granular.  There are some redness to the medial aspect of the toe.  No purulent drainage noted.     Vascular: DP & PT pulses are intact & regular bilaterally.  No significant edema or varicosities noted.  CFT and skin temperature is normal to both lower extremities.     Neurologic: Lower extremity sensation is diminished to feet.      Musculoskeletal: Patient is ambulatory without assistive device or brace.  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 -No evidence of bone infection at the distal aspect of the second toe.  No soft tissue gas noted.     ASSESSMENT:     Ulcer of right second toe with fat layer exposed (H)  Idiopathic progressive neuropathy  Hammertoe of second toe of right foot        Medical Decision Making/Plan:  Reviewed patient's chart in [facility]. Reviewed and discussed causes of hammertoes with patient.  Explained that this can be caused by an overpowering of muscles or by the way we walk.  Discussed conservative treatments such as orthotics, pads, shoe gear.  Explained  that sometimes the flexor tendons can be cut to try and straighten the toe and reduce rubbing. This is normally done in office and patient is weight bearing in postop she for 1-2 weeks.  We also discussed surgical intervention to remove the joint and possibly fuse the toe.  Normally patient has a pin sticking out of the toe for about 6 weeks and can not get the foot wet. Patient would have to be minimal weight bearing in cam boot.       Discussed that because of his neuropathy and the hammertoe that the ulcer has developed.  This was debrided.  Please see procedure note below.  We will have him continue to iodine to the area twice a day with a Band-Aid.  We will also have him wear a toe crest pad to try to take pressure off of the tip of the toe to try to help with healing.  We talked about possibly a tenotomy at some to try to help decrease pressure and try to prevent this from the ulcerating.  We will get baseline x-rays today and start him on oral Keflex.  We will have him follow-up in 2 weeks for reassessment.  He wants to hold off on a tenotomy for now. All questions were answered to patient's satisfaction and he will call further questions or concerns.     Procedure: After verbal consent, excisional debridement was performed on ulcer.  #15 blade was used to debride ulcer down to and including subcutaneous tissue. Bleeding controlled with light pressure.   No drainage noted.  No anesthesia was used due to neuropathy. Dry dressing applied to foot.  Patient tolerated procedure well.        Patient risk factor: Patient is at medium risk for infection.     Ashleigh Chavez DPM, Podiatry/Foot and Ankle Surgery        Again, thank you for allowing me to participate in the care of your patient.        Sincerely,        Ashleigh Chavez DPM, Podiatry/Foot and Ankle Surgery

## 2023-05-16 NOTE — PROGRESS NOTES
Podiatry / Foot and Ankle Surgery Progress Note    May 16, 2023    Subject: Patient was seen for right 2nd toe.  he has been doing iodine to the toe daily.   He has numbness to the feet so its not really painful.  Denies fever, nausea, vomiting.    Denies specific injury.    Objective:  Vitals: /68   Wt 93.9 kg (207 lb)   BMI 28.87 kg/m    BMI= Body mass index is 28.87 kg/m .    General:  Patient is alert and orientated.  NAD.    Dermatologic: Full-thickness stage III ulceration to the distal aspect of the right second toe.  This measures 0.4 x 0.4 x 0.1 cm.  Base of the wound is granular.  There are some redness to the medial aspect of the toe.  No purulent drainage noted.     Vascular: DP & PT pulses are intact & regular bilaterally.  No significant edema or varicosities noted.  CFT and skin temperature is normal to both lower extremities.     Neurologic: Lower extremity sensation is diminished to feet.      Musculoskeletal: Patient is ambulatory without assistive device or brace.  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 -No evidence of bone infection at the distal aspect of the second toe.  No soft tissue gas noted.     ASSESSMENT:     Ulcer of right second toe with fat layer exposed (H)  Idiopathic progressive neuropathy  Hammertoe of second toe of right foot        Medical Decision Making/Plan:  Reviewed patient's chart in [facility]. Reviewed and discussed causes of hammertoes with patient.  Explained that this can be caused by an overpowering of muscles or by the way we walk.  Discussed conservative treatments such as orthotics, pads, shoe gear.  Explained that sometimes the flexor tendons can be cut to try and straighten the toe and reduce rubbing. This is normally done in office and patient is weight bearing in postop she for 1-2 weeks.  We also discussed surgical intervention to remove the joint and possibly fuse the toe.   Normally patient has a pin sticking out of the toe for about 6 weeks and can not get the foot wet. Patient would have to be minimal weight bearing in cam boot.       Discussed that because of his neuropathy and the hammertoe that the ulcer has developed.  This was debrided.  Please see procedure note below.  We will have him continue to iodine to the area twice a day with a Band-Aid.  We will also have him wear a toe crest pad to try to take pressure off of the tip of the toe to try to help with healing.  We talked about possibly a tenotomy at some to try to help decrease pressure and try to prevent this from the ulcerating.  We will get baseline x-rays today and start him on oral Keflex.  We will have him follow-up in 2 weeks for reassessment.  He wants to hold off on a tenotomy for now. All questions were answered to patient's satisfaction and he will call further questions or concerns.     Procedure: After verbal consent, excisional debridement was performed on ulcer.  #15 blade was used to debride ulcer down to and including subcutaneous tissue. Bleeding controlled with light pressure.   No drainage noted.  No anesthesia was used due to neuropathy. Dry dressing applied to foot.  Patient tolerated procedure well.        Patient risk factor: Patient is at medium risk for infection.     Ashleigh Chavez DPM, Podiatry/Foot and Ankle Surgery

## 2023-05-30 NOTE — TELEPHONE ENCOUNTER
ANTICOAGULATION     Malcolm Barker is overdue for INR check.      Spoke with Jaskaran and scheduled lab appointment on 6/1/23    Cici Reddy RN

## 2023-05-31 NOTE — PROGRESS NOTES
Podiatry / Foot and Ankle Surgery Progress Note    May 31, 2023    Subject: Patient was seen for for right 2nd toe.  he has been doing antibiotic ointment to the toe daily.   He has numbness to the feet so its not really painful.  Denies fever, nausea, vomiting.    Denies specific injury.     Objective:  Vitals: Wt 93.9 kg (207 lb)   BMI 28.87 kg/m    BMI= Body mass index is 28.87 kg/m .    General:  Patient is alert and orientated.  NAD.     Dermatologic: Full-thickness stage III ulceration to the distal aspect of the right second toe.  This measures 0.4 x 0.4 x 0.1 cm.  Base of the wound is granular.  There are some redness to the medial aspect of the toe.  No purulent drainage noted.     Vascular: DP & PT pulses are intact & regular bilaterally.  No significant edema or varicosities noted.  CFT and skin temperature is normal to both lower extremities.     Neurologic: Lower extremity sensation is diminished to feet.      Musculoskeletal: Patient is ambulatory without assistive device or brace.  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 -No evidence of bone infection at the distal aspect of the second toe.  No soft tissue gas noted.     ASSESSMENT:     Ulcer of right second toe with fat layer exposed (H)  Idiopathic progressive neuropathy  Hammertoe of second toe of right foot     Medical Decision Making/Plan:  Reviewed patient's chart in [facility]. Reviewed and discussed causes of hammertoes with patient.  Explained that this can be caused by an overpowering of muscles or by the way we walk.  Discussed conservative treatments such as orthotics, pads, shoe gear.  Explained that sometimes the flexor tendons can be cut to try and straighten the toe and reduce rubbing. This is normally done in office and patient is weight bearing in postop she for 1-2 weeks.  We also discussed surgical intervention to remove the joint and possibly fuse the  toe.  Normally patient has a pin sticking out of the toe for about 6 weeks and can not get the foot wet. Patient would have to be minimal weight bearing in cam boot.      Discussed that because of his neuropathy and the hammertoe that the ulcer has developed.  This was debrided.  Please see procedure note below.  We will have him continue to iodine to the area twice a day with a Band-Aid.  We will also have him wear a toe crest pad to try to take pressure off of the tip of the toe to try to help with healing.  We again talked about possibly a tenotomy at some to try to help decrease pressure and try to prevent this from the ulcerating. He wants to hold off on a tenotomy until the end of the summer if he can.     All questions were answered to patient's satisfaction and he will call further questions or concerns.     Procedure: After verbal consent, excisional debridement was performed on ulcer.  #15 blade was used to debride ulcer down to and including subcutaneous tissue. Bleeding controlled with light pressure.   No drainage noted.  No anesthesia was used due to neuropathy. Dry dressing applied to foot.  Patient tolerated procedure well.          Patient risk factor: Patient is at medium risk for infection.       Ashleigh Chavez DPM, Podiatry/Foot and Ankle Surgery

## 2023-05-31 NOTE — LETTER
5/31/2023         RE: Malcolm Barker  [address] [place] [address]        Dear Colleague,    Thank you for referring your patient, Malcolm Barker, to the [facility] PODIATRY. Please see a copy of my visit note below.    Podiatry / Foot and Ankle Surgery Progress Note    May 31, 2023    Subject: Patient was seen for for right 2nd toe.  he has been doing antibiotic ointment to the toe daily.   He has numbness to the feet so its not really painful.  Denies fever, nausea, vomiting.    Denies specific injury.     Objective:  Vitals: Wt 93.9 kg (207 lb)   BMI 28.87 kg/m    BMI= Body mass index is 28.87 kg/m .    General:  Patient is alert and orientated.  NAD.     Dermatologic: Full-thickness stage III ulceration to the distal aspect of the right second toe.  This measures 0.4 x 0.4 x 0.1 cm.  Base of the wound is granular.  There are some redness to the medial aspect of the toe.  No purulent drainage noted.     Vascular: DP & PT pulses are intact & regular bilaterally.  No significant edema or varicosities noted.  CFT and skin temperature is normal to both lower extremities.     Neurologic: Lower extremity sensation is diminished to feet.      Musculoskeletal: Patient is ambulatory without assistive device or brace.  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 -No evidence of bone infection at the distal aspect of the second toe.  No soft tissue gas noted.     ASSESSMENT:     Ulcer of right second toe with fat layer exposed (H)  Idiopathic progressive neuropathy  Hammertoe of second toe of right foot     Medical Decision Making/Plan:  Reviewed patient's chart in [facility]. Reviewed and discussed causes of hammertoes with patient.  Explained that this can be caused by an overpowering of muscles or by the way we walk.  Discussed conservative treatments such as orthotics, pads, shoe gear.  Explained  that sometimes the flexor tendons can be cut to try and straighten the toe and reduce rubbing. This is normally done in office and patient is weight bearing in postop she for 1-2 weeks.  We also discussed surgical intervention to remove the joint and possibly fuse the toe.  Normally patient has a pin sticking out of the toe for about 6 weeks and can not get the foot wet. Patient would have to be minimal weight bearing in cam boot.      Discussed that because of his neuropathy and the hammertoe that the ulcer has developed.  This was debrided.  Please see procedure note below.  We will have him continue to iodine to the area twice a day with a Band-Aid.  We will also have him wear a toe crest pad to try to take pressure off of the tip of the toe to try to help with healing.  We again talked about possibly a tenotomy at some to try to help decrease pressure and try to prevent this from the ulcerating. He wants to hold off on a tenotomy until the end of the summer if he can.     All questions were answered to patient's satisfaction and he will call further questions or concerns.     Procedure: After verbal consent, excisional debridement was performed on ulcer.  #15 blade was used to debride ulcer down to and including subcutaneous tissue. Bleeding controlled with light pressure.   No drainage noted.  No anesthesia was used due to neuropathy. Dry dressing applied to foot.  Patient tolerated procedure well.          Patient risk factor: Patient is at medium risk for infection.       Ashleigh Chavez DPM, Podiatry/Foot and Ankle Surgery               Again, thank you for allowing me to participate in the care of your patient.        Sincerely,        Ashleigh Chavez DPM, Podiatry/Foot and Ankle Surgery

## 2023-05-31 NOTE — PATIENT INSTRUCTIONS
Thank you for choosing [facility] Podiatry / Foot & Ankle Surgery!    DR RIVERA'S CLINIC:  [facility]   [address]   [place] [address]      TRIAGE LINE: [phone]  APPOINTMENTS: [phone]  RADIOLOGY: [phone]  SET UP SURGERY: [phone]  PHYSICAL THERAPY: [phone]   FAX NUMBER: [phone]  BILLING QUESTIONS: [phone]       Follow up: 1 month

## 2023-06-01 NOTE — PROGRESS NOTES
Mailed standing INR order to patient at his [place] address:     [address] [address]

## 2023-06-01 NOTE — PROGRESS NOTES
ANTICOAGULATION MANAGEMENT     Malcolm HENDERSON [facility] 83 year old male is on warfarin with therapeutic INR result. (Goal INR 2.0-3.0)    Recent labs: (last 7 days)     06/01/23  1247   INR 2.6*       ASSESSMENT       Source(s): Chart Review and Patient/Caregiver Call       Warfarin doses taken: Warfarin taken as instructed    Diet: No new diet changes identified    Medication/supplement changes: Pt was hospitalized 5/18/23 to 5/19/23 for CHF. Pt had been advised to increase bumex-no interaction anticipated, decrease metoprolol,decrease potassium and start spironolactone which can decrease the anticoagulant effectiveness.    New illness, injury, or hospitalization: Yes: See above    Signs or symptoms of bleeding or clotting: No    Previous result: Supratherapeutic    Additional findings: Pt will be in [place] from June 5th to October 2023. A lab order was created and mailed to pt's [place] address as requested: [id number] [facility] [id number]         PLAN       Dosing Instructions: Continue your current warfarin dose with next INR in 3 weeks       Summary  As of 6/1/2023    Full warfarin instructions:  5 mg every Mon, Fri; 2.5 mg all other days   Next INR check:  6/22/2023             Telephone call with Jaskaran who verbalizes understanding and agrees to plan    Patient using outside facility for labs    Education provided:     Please call back if any changes to your diet, medications or how you've been taking warfarin    Contact [phone]  with any changes, questions or concerns.     Plan made per [facility] anticoagulation protocol    Tasia Allen, RN  Anticoagulation Clinic  6/1/2023    _______________________________________________________________________     Anticoagulation Episode Summary     Current INR goal:  2.0-3.0   TTR:  52.7 % (1 y)   Target end date:  Indefinite   Send INR reminders to:  JOHNNIE LACEY    Indications    Long term current use of anticoagulant therapy [Z79.01]  Chronic atrial  fibrillation (H) [I48.20]           Comments:           Anticoagulation Care Providers     Provider Role Specialty Phone number    Tod Callahan MD Referring Internal Medicine - Pediatrics [phone]    Leisa Mistry MD Referring Internal Medicine - Pediatrics [phone]

## 2023-06-12 NOTE — TELEPHONE ENCOUNTER
Would patient like to come in for this visit? That may be best so exam and labs can be completed at that time.   Rox Delaney PA-C

## 2023-06-21 NOTE — PROGRESS NOTES
"Jaskaran is a 83 year old who is being evaluated via a billable video visit.        Assessment & Plan       ICD-10-CM    1. Encounter for annual wellness exam in Medicare patient  Z00.00       2. Chronic systolic congestive heart failure (H)  I50.22 Lipid panel reflex to direct LDL Fasting     TSH with free T4 reflex     Lyme Disease Total Abs Bld with Reflex to Confirm CLIA      3. Ischemic cardiomyopathy  I25.5 Lipid panel reflex to direct LDL Fasting     TSH with free T4 reflex     Lyme Disease Total Abs Bld with Reflex to Confirm CLIA      4. Generalized weakness  R53.1         Overall he is feeling well.  Would like to update some labs. Orders signed and a future lab appt scheduled.  He will continue w/ his current medications. Also will f/u as directed w/ his cardiology team.        BMI:   Estimated body mass index is 26.78 kg/m  as calculated from the following:    Height as of 2/27/23: 1.803 m (5' 11\").    Weight as of 5/31/23: 87.1 kg (192 lb).           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  I have lost energy, strength and weight, and require more sleep.     I was recently reminded of a history of thyroid disease " "in my mother's side of the family- should be tested.   The thought also has occurred to me that I should be tested for various types of Lymes disease, as we summer in [place] where Lymes is prevalent.\"    He has been feeling fatigued, especially over the past year.  Regular visits with cardiology.   Recently started [employer].    As above, he is concerned about thyroid issues.  Lab Results   Component Value Date    TSH 1.31 04/25/2019    TSH 2.38 03/29/2016     We reviewed his cardiac issues. Primarily managed by his cardiology team.     Annual Wellness Visit    Patient has been advised of split billing requirements and indicates understanding: Yes     Are you in the first 12 months of your Medicare Part B coverage?  No    Physical Health:    In general, how would you rate your overall physical health? good    Outside of work, how many days during the week do you exercise?2-3 days/week    Outside of work, approximately how many minutes a day do you exercise?less than 15 minutes    If you drink alcohol do you typically have >3 drinks per day or >7 drinks per week? No    Do you usually eat at least 4 servings of fruit and vegetables a day, include whole grains & fiber and avoid regularly eating high fat or \"junk\" foods? Yes    Do you have any problems taking medications regularly? No    Do you have any side effects from medications? none    Needs assistance for the following daily activities: no assistance needed    Which of the following safety concerns are present in your home?  none identified     Hearing impairment: No    In the past 6 months, have you been bothered by leaking of urine? no    Mental Health:    In general, how would you rate your overall mental or emotional health? excellent  PHQ-2 Score: 0    Do you feel safe in your environment? Yes    Have you ever done Advance Care Planning? (For example, a Health Directive, POLST, or a discussion with a medical provider or your loved ones about your wishes)? " Yes, advance care planning is on file.    Fall risk:  Fallen 2 or more times in the past year?: No  Any fall with injury in the past year?: No      Cognitive Screening: Unable to complete due to virtual visit; need for additional assessment in future face-to-face visit    Do you have sleep apnea, excessive snoring or daytime drowsiness?: no    Social History     Tobacco Use     Smoking status: Former     Years: 3.00     Types: Cigarettes     Quit date: 1999     Years since quittin.2     Smokeless tobacco: Never     Tobacco comments:     quit approx    Substance Use Topics     Alcohol use: Yes     Comment: 2 drinks daily             2023     8:33 AM   Alcohol Use   Prescreen: >3 drinks/day or >7 drinks/week? No     Do you have a current opioid prescription? No  Do you use any other controlled substances or medications that are not prescribed by a provider? None    Current providers sharing in care for this patient include:   Patient Care Team:  Tod Callahan MD as PCP - General (Internal Medicine)  Tod Callahan MD as Assigned PCP  Mandi Payne [facility] as Pharmacist (Pharmacist)  Ashleigh Chavez DPM, Podiatry/Foot and Ankle Surgery as Assigned Musculoskeletal Provider        I have reviewed Opioid Use Disorder and Substance Use Disorder risk factors and made any needed referrals.     Answers for HPI/ROS submitted by the patient on 2023  abdominal pain: No  Blood in stool: No  Blood in urine: No  chest pain: No  chills: No  congestion: No  constipation: No  cough: No  diarrhea: No  dizziness: No  ear pain: No  eye pain: No  nervous/anxious: No  fever: No  frequency: Yes  genital sores: No  headaches: No  hearing loss: No  heartburn: No  joint swelling: No  peripheral edema: No  mood changes: No  myalgias: No  nausea: No  dysuria: No  palpitations: No  Skin sensation changes: No  sore throat: No  urgency: Yes  rash: No  shortness of breath: Yes  visual disturbance: No  weakness:  Yes  impotence: Yes  penile discharge: No              Objective           Vitals:  No vitals were obtained today due to virtual visit.    Physical Exam   GEN: No distress  SKIN: No visible rashes  E: No eye drainage or icterus  ENT: No nasal discharge noted  NECK: Supple  LUNGS: No active cough, wheezing.   PSYCH: Normal affect. Well groomed.   NEURO: No focal deficits appreciated              Video-Visit Details    Type of service:  Video Visit   Video Start Time: 9:06 AM  Video End Time:9:31 AM    Originating Location (pt. Location): Home  Distant Location (provider location):  On-site  Platform used for Video Visit: [employer]

## 2023-06-29 NOTE — TELEPHONE ENCOUNTER
Per conversation with [facility] Pharmacist, Maxine Reddy, continue current warfarin dose and check INR 7/5/23.    Called patient, discussed the interaction of warfarin and amiodarone and the need for close INR monitoring. Patient reports he will have his INR checked on 7/5/23.    Deb Reddy RN, BSN  Anticoagulation Clinic

## 2023-06-29 NOTE — TELEPHONE ENCOUNTER
Received a call from [facility] stating that Jaskaran is starting on Amiodarone. Pt was in ED yesterday for a fall, nasal bone fractures, and a fib. He spoke with [facility] today and they will start him on Amiodarone at the following dose:    Plan to start amiodarone 400 mg twice daily x 7d then 400 mg daily x 7d [id number] mg daily thereafter.   Routing to managing [facility].

## 2023-06-29 NOTE — TELEPHONE ENCOUNTER
ANTICOAGULATION     Malcolm HENDERSON Juan Manuelcarlosrhiannonsaige is overdue for INR check.      I spoke with Jaskaran, he is in [place] and has an order from [facility] to have his INR drawn while there.   Patient states he cannot go to the lab this week but will go to the lab week of the 7/3/23.    Kaylah [facility] RN

## 2023-07-05 NOTE — TELEPHONE ENCOUNTER
DATE/TIME OF CALL RECEIVED FROM LAB:  07/05/23 at 12:13 PM   LAB TEST:  INR  LAB VALUE:  5.4 INR  PROVIDER NOTIFIED?: Yes  PROVIDER NAME: Dr. Tod Callahan  DATE/TIME LAB VALUE REPORTED TO PROVIDER: 7/5/23 12:15PM  MECHANISM OF PROVIDER NOTIFICATION: Routed high priority  PROVIDER RESPONSE: sent to clinic and provider as high priority message    Beba Marr MLS at [facility] called to report critical lab value ordered by Dr. Callahan

## 2023-07-05 NOTE — TELEPHONE ENCOUNTER
Likely due to recent amiodarone start, will defer management to anticoagulation team. Happy to help manage if needed as PCP is out of the office.    Leisa Mistry MD  Internal Medicine-Pediatrics

## 2023-07-05 NOTE — PROGRESS NOTES
"ANTICOAGULATION MANAGEMENT     Malcolm HENDERSON [facility] 83 year old male is on warfarin with supratherapeutic INR result. (Goal INR 2.0-3.0)    Recent labs: (last 7 days)     07/05/23  0000   INR 5.4*       ASSESSMENT       Source(s): Chart Review and Patient/Caregiver Call       Warfarin doses taken: Warfarin taken as instructed    Diet: No new diet changes identified    Medication/supplement changes: amiodarone started on 6/29/23, patient states he will decrease to once daily dosing starting on 7/6/23.    New illness, injury, or hospitalization: Yes: ER visit on 6/28/23, patient states he tripped on a thin wire and broke his nose. Patient states he is not having much pain.    Signs or symptoms of bleeding or clotting: Yes: facial bruising from recent fall, patient states the bruising is \"moving down his face\" but the color in some areas is getting lighter (starting to heal).    Previous result: Therapeutic last visit; previously outside of goal range  Additional findings: patient informed I will fax in 2mg warfarin tablets, per [facility] Demi's request. I confirmed with Demi that she prefers 2mg versus 2.5mg tablets, her response: I don't like 2.5 mg tablets; they make decimals out to the hundredths place which can be confusing and hard to hear over the phone.  Patient will  the 2mg tablets on 7/7/23 so we can change warfarin dosing with his next INR on 7/7/23.         PLAN     Recommended plan for ongoing change(s) affecting INR     Dosing Instructions: hold 2 doses then decrease your warfarin dose (22% change) with next INR in 2 days       Summary  As of 7/5/2023    Full warfarin instructions:  7/5: Hold; 7/6: Hold; Otherwise 2.5 mg every day   Next INR check:  7/7/2023             Telephone call with Jaskaran who agrees to plan and repeated back plan correctly    Lab visit scheduled    Education provided:     Taking warfarin: prescribed tablet strength and color    Interaction IS anticipated between warfarin and " amiodarone    Plan made with [facility] Pharmacist Demi [facility], RN  Anticoagulation Clinic  7/5/2023    _______________________________________________________________________     Anticoagulation Episode Summary     Current INR goal:  2.0-3.0   TTR:  44.8 % (1 y)   Target end date:  Indefinite   Send INR reminders to:  [facility]    Indications    Long term current use of anticoagulant therapy [Z79.01]  Chronic atrial fibrillation (H) [I48.20]           Comments:           Anticoagulation Care Providers     Provider Role Specialty Phone number    Tod Callahan MD Referring Internal Medicine - Pediatrics [phone]    Leisa Mistry MD Referring Internal Medicine - Pediatrics [phone]

## 2023-07-05 NOTE — TELEPHONE ENCOUNTER
INR verified with fax received into clinic.    Edna Mayes RN    [facility] Anticoagulation Clinic

## 2023-07-07 NOTE — TELEPHONE ENCOUNTER
ANTICOAGULATION     Malcolm Barker is overdue for INR check.      Spoke with Jaskaran - he is utilizing outpatient lab over the summer due to living in [place]. INR reading obtained through Care Everywhere, will open [facility] encounter.     Cici Reddy RN

## 2023-07-07 NOTE — TELEPHONE ENCOUNTER
Reason for Call:  INR follow up    Detailed comments: Patient is returning call to Cici from today, please call again    Phone Number Patient can be reached at: Cell number on file:    Telephone Information:   Mobile [phone]       Best Time: any    Can we leave a detailed message on this number? YES    Call taken on 7/7/2023 at 4:21 PM by Luann Foote

## 2023-07-07 NOTE — TELEPHONE ENCOUNTER
Spoke with patient/representative. See [facility] note for detailing.    Cici Reddy RN  [facility]ticoagulation [facility]  [phone]

## 2023-07-07 NOTE — PROGRESS NOTES
ANTICOAGULATION MANAGEMENT     Malcolm HENDERSON [facility] 83 year old male is on warfarin with supratherapeutic INR result. (Goal INR 2.0-3.0)    Recent labs: (last 7 days)     07/07/23  1522   INR 4.5*       ASSESSMENT       Source(s): Chart Review and Patient/Caregiver Call       Warfarin doses taken: Warfarin taken as instructed - held as advised on 7/5 and 7/6 for critical INR    Diet: No new diet changes identified    Medication/supplement changes: Continues on amiodarone taper    New illness, injury, or hospitalization: No    Signs or symptoms of bleeding or clotting: No    Previous result: Supratherapeutic    Additional findings: Moving patient to critical priority while utilizing outside lab ([facility]) while summering in [place]. Refresh care everywhere for his result.          PLAN     Recommended plan for temporary change(s) affecting INR     Dosing Instructions: hold dose then decrease your warfarin dose (14.3% change) with next INR in 5 days       Summary  As of 7/7/2023    Full warfarin instructions:  7/7: Hold; Otherwise 3 mg every Fri; 2 mg all other days   Next INR check:  7/12/2023             Telephone call with Jaskaran who verbalizes understanding and agrees to plan    Patient using outside facility for labs    Education provided:     Please call back if any changes to your diet, medications or how you've been taking warfarin    Interaction IS anticipated between warfarin and amiodarone    Plan made with [facility] Pharmacist Demi Reddy RN  Anticoagulation Clinic  7/7/2023    _______________________________________________________________________     Anticoagulation Episode Summary     Current INR goal:  2.0-3.0   TTR:  44.1 % (1 y)   Target end date:  Indefinite   Send INR reminders to:  [facility]    Indications    Long term current use of anticoagulant therapy [Z79.01]  Chronic atrial fibrillation (H) [I48.20]           Comments:           Anticoagulation Care Providers      Provider Role Specialty Phone number    Tod Callahan MD Referring Internal Medicine - Pediatrics [phone]    Leisa Mistry MD Referring Internal Medicine - Pediatrics [phone]

## 2023-07-12 NOTE — PROGRESS NOTES
Incoming fax from [facility] lab    Date of result  7/12/23    INR result  1.8

## 2023-07-12 NOTE — PROGRESS NOTES
ANTICOAGULATION MANAGEMENT     Malcolm HENDERSON [facility] 83 year old male is on warfarin with subtherapeutic INR result. (Goal INR 2.0-3.0)    Recent labs: (last 7 days)     07/12/23  1002   INR 1.8*       ASSESSMENT       Source(s): Chart Review and Patient/Caregiver Call       Warfarin doses taken: Warfarin taken as instructed    Diet: No new diet changes identified    Medication/supplement changes: Amiodarone dose was lowered from 400 mg twice daily to 400 mg once daily on 7/6/23.  Amiodarone dose will be lowered from 400 mg to 200 mg daily on 7/13/23.    New illness, injury, or hospitalization: No    Signs or symptoms of bleeding or clotting: bruising on his face continues to heal.    Previous result: Supratherapeutic.  Maintenance dose was lowered by 14% at last INR check.    Additional findings: None         PLAN     Recommended plan for ongoing change(s) affecting INR     Dosing Instructions: Continue your current weekly warfarin dose with next INR in 1 week       Summary  As of 7/12/2023    Full warfarin instructions:  3 mg every Wed; 2 mg all other days   Next INR check:  7/19/2023             Telephone call with Jaskaran who agrees to plan and repeated back plan correctly    Patient using outside facility for labs    Education provided:     Please call back if any changes to your diet, medications or how you've been taking warfarin    Plan made with [facility] Pharmacist Demi Phoenix, APPLE  Anticoagulation Clinic  7/12/2023    _______________________________________________________________________     Anticoagulation Episode Summary     Current INR goal:  2.0-3.0   TTR:  43.3 % (1 y)   Target end date:  Indefinite   Send INR reminders to:  [facility]    Indications    Long term current use of anticoagulant therapy [Z79.01]  Chronic atrial fibrillation (H) [I48.20]           Comments:           Anticoagulation Care Providers     Provider Role Specialty Phone number    Tod Callahan MD Referring  Internal Medicine - Pediatrics [phone]    Leisa Mistry MD Referring Internal Medicine - Pediatrics [phone]

## 2023-07-19 NOTE — PROGRESS NOTES
Incoming fax from [facility]    Collection date: 07/19/2023 2499  INR: 2.1

## 2023-07-19 NOTE — PROGRESS NOTES
ANTICOAGULATION MANAGEMENT     Malcolm HENDERSON [facility] 83 year old male is on warfarin with therapeutic INR result. (Goal INR 2.0-3.0)    Recent labs: (last 7 days)     07/19/23  1150   INR 2.1*       ASSESSMENT       Source(s): Chart Review and Patient/Caregiver Call       Warfarin doses taken: Warfarin taken as instructed    Diet: No new diet changes identified    Medication/supplement changes: Amiodarone dose reduced to 200mg daily on 7/13/23    New illness, injury, or hospitalization: No    Signs or symptoms of bleeding or clotting: No    Previous result: Subtherapeutic    Additional findings: None         PLAN     Recommended plan for ongoing change(s) affecting INR     Dosing Instructions: Continue your current warfarin dose with next INR in 9-12 days       Summary  As of 7/19/2023    Full warfarin instructions:  3 mg every Wed; 2 mg all other days   Next INR check:  7/31/2023             Telephone call with Jaskaran who verbalizes understanding and agrees to plan    Patient using outside facility for labs    Education provided:     Contact [phone]  with any changes, questions or concerns.     Plan made per [facility] anticoagulation protocol    Kaylah Sherman, RN  Anticoagulation Clinic  7/19/2023    _______________________________________________________________________     Anticoagulation Episode Summary     Current INR goal:  2.0-3.0   TTR:  42.0 % (1 y)   Target end date:  Indefinite   Send INR reminders to:  [facility]    Indications    Long term current use of anticoagulant therapy [Z79.01]  Chronic atrial fibrillation (H) [I48.20]           Comments:           Anticoagulation Care Providers     Provider Role Specialty Phone number    Tod Callahan MD Referring Internal Medicine - Pediatrics [phone]    Leisa Mistry MD Referring Internal Medicine - Pediatrics [phone]

## 2023-07-24 NOTE — TELEPHONE ENCOUNTER
Patient calling to speak with Dr. Callahan    For his Heart disease, he is using [facility] for cardiology. The lab is for other lab tests to see if there is something else causing the fatigue.    States he feels his condition has deteriorated since then, and would like a referral to [facility] for a second opinion.     Sent my chart message with info on how to send an e-visit to Dr. Callahan.     APPLE Bosch on 7/24/2023 at 10:19 AM

## 2023-08-15 PROBLEM — H35.363 DRUSEN (DEGENERATIVE) OF MACULA, BILATERAL: Status: ACTIVE | Noted: 2018-08-06

## 2023-08-15 PROBLEM — J18.9 CAP (COMMUNITY ACQUIRED PNEUMONIA): Status: ACTIVE | Noted: 2017-09-01

## 2023-08-15 PROBLEM — H40.9 GLAUCOMA: Status: ACTIVE | Noted: 2023-01-01

## 2023-08-15 PROBLEM — R57.0 CARDIOGENIC SHOCK (H): Status: ACTIVE | Noted: 2023-01-01

## 2023-08-15 PROBLEM — L03.90 CELLULITIS: Status: ACTIVE | Noted: 2023-01-01

## 2023-08-15 PROBLEM — H43.819 VITREOUS DEGENERATION: Status: ACTIVE | Noted: 2023-01-01

## 2023-08-15 PROBLEM — Z98.890 HISTORY OF TRABECULECTOMY: Status: ACTIVE | Noted: 2019-12-11

## 2023-08-15 PROBLEM — H35.3131 EARLY DRY STAGE NONEXUDATIVE AGE-RELATED MACULAR DEGENERATION OF BOTH EYES: Status: ACTIVE | Noted: 2018-08-06

## 2023-08-15 PROBLEM — I46.9 CARDIAC ARREST (H): Status: ACTIVE | Noted: 2023-01-01

## 2023-08-16 PROBLEM — R57.0 CARDIOGENIC SHOCK (H): Status: RESOLVED | Noted: 2023-01-01 | Resolved: 2023-01-01

## 2023-08-16 PROBLEM — I46.9 CARDIAC ARREST (H): Status: RESOLVED | Noted: 2023-01-01 | Resolved: 2023-01-01

## 2023-08-16 NOTE — PROGRESS NOTES
[facility] GERIATRIC SERVICES       Patient Malcolm HENDERSON [facility]  MRN: [id number]        Reason for Visit     Chief Complaint   Patient presents with    Hospital F/U       Code Status     CPR/Full code     Assessment     Acute on chronic HFrEF  Ischaemic cardiomyopathy  MR s/p devorah clip  A Fib   LLE cellulitis  BETH  HTN with low BP  Generalized weakness    Plan     Pt is admitted to TCU for strengthening and rehab.  Pt was admitted with acute CHF  Cardiology was consulted.  Could not tolerate Bumex  Switched to lasix  Metoprolol was stopped  Entresto discontinued due to renal failure  Now on hydralazine and torsemide with aldactone  Barostim placment recommended as outpt  Monitor wt  Edema is resolved  BP remain low   Pt is aware of his limited prognosis and plans to follow-up with cardiology  Monitor for cellulitis-appears to be resolved and  edema is minimal  He has lost 30lb  Recheck labs to monitor renal function  Continue with PT/OT-remains weak and debilitated  Endurance is limited  Aware of poor prognosis and plans to follow-up with cardiology  Inr 2.9 today-continue warfarin    History     Patient is a very pleasant 83 year old male who is admitted to TCU  Pt was admitted with CHF exacerbation  Cardiology was consulted.  He was treated with diuretics  He has lost 30lb  Also had cellulitis of leg treated with abx-this is resolving   Discharged to TCU due to weakness       Past Medical & Surgical History     PAST MEDICAL HISTORY:   Past Medical History:   Diagnosis Date    Contracture of palmar fascia     both hands, mild    Esophageal reflux     Essential hypertension, benign     Generalized osteoarthrosis, unspecified site     L shoulder, r hip..  improved now with exercise, glucosamine    Glaucoma     Hernia, abdominal     Hypertrophy (benign) of prostate     mild slowing    Infection due to 2019 novel coronavirus 1/28/2023      PAST SURGICAL HISTORY:   has a past surgical history that includes NONSPECIFIC  PROCEDURE (5/04); NONSPECIFIC PROCEDURE (1993); Eye surgery; colonoscopy (4/24/2013); ENT surgery; Colonoscopy (4/24/2013); Implant automatic implantable cardioverter defibrillator (2/2014); and hernia repair.      Past Social History     Reviewed,  reports that he quit smoking about 24 years ago. His smoking use included cigarettes. He has never used smokeless tobacco. He reports current alcohol use. He reports that he does not use drugs.    Family History     Reviewed, and family history includes Breast Cancer in his sister; Cardiovascular in his mother; Family History Negative in his father; Thyroid Disease in his maternal aunt.    Medication List     Current Outpatient Medications   Medication    acetaminophen (TYLENOL) 500 MG tablet    albuterol (PROAIR HFA/PROVENTIL HFA/VENTOLIN HFA) 108 (90 Base) MCG/ACT inhaler    amiodarone (PACERONE) 200 MG tablet    Ascorbic Acid (VITAMIN C) 500 MG CAPS    cholecalciferol (VITAMIN D3) 25 mcg (1000 units) capsule    clopidogrel (PLAVIX) 75 MG tablet    cycloSPORINE (RESTASIS) 0.05 % ophthalmic emulsion    DULoxetine (CYMBALTA) 30 MG capsule    Glucosamine-Chondroit-Vit C-Mn (GLUCOSAMINE CHONDR 1500 COMPLX PO)    hydrALAZINE (APRESOLINE) 10 MG tablet    latanoprost (XALATAN) 0.005 % ophthalmic solution    Multiple Vitamin (MULTIVITAMIN OR)    omeprazole (PRILOSEC) 20 MG CR capsule    rosuvastatin (CRESTOR) 10 MG tablet    spironolactone (ALDACTONE) 25 MG tablet    torsemide (DEMADEX) 100 MG tablet    atorvastatin (LIPITOR) 40 MG tablet    bromfenac (BROMDAY) 0.09 % ophthalmic solution    Bromfenac Sodium (BROMDAY) 0.09 % SOLN    bumetanide (BUMEX) 1 MG tablet    Coenzyme Q10 (COQ10) 200 MG CAPS    diclofenac (VOLTAREN) 1 % topical gel    fluticasone (FLONASE) 50 MCG/ACT nasal spray    gabapentin (NEURONTIN) 300 MG capsule    lisinopril (ZESTRIL) 5 MG tablet    Magnesium Oxide 250 MG TABS    metoprolol succinate ER (TOPROL XL) 25 MG 24 hr tablet    ofloxacin (OCUFLOX) 0.3 %  "ophthalmic solution    potassium chloride SA (K-DUR/KLOR-CON M) 20 MEQ CR tablet    sotalol (BETAPACE) 80 MG tablet    tadalafil (CIALIS) 10 MG tablet    timolol (TIMOPTIC) 0.5 % ophthalmic solution    warfarin ANTICOAGULANT (COUMADIN) 2 MG tablet    warfarin ANTICOAGULANT (COUMADIN) 5 MG tablet     No current facility-administered medications for this visit.      MED REC REQUIRED  Post Medication Reconciliation Status: discharge medications reconciled, continue medications without change       Allergies     Allergies   Allergen Reactions    Carvedilol Other (See Comments)     fatigue    Dorzolamide Other (See Comments)     Other reaction(s): Unknown/Not Verified  Red irritated eye  Red irritated eye      Metoprolol Other (See Comments)     Other reaction(s): Hypotension       Review of Systems   A comprehensive review of 14 systems was done. Pertinent findings noted here and in history of present illness. All the rest negative.  Constitutional: Negative.  Negative for fever, chills, he has  activity change, appetite change and fatigue.   HENT: Negative for congestion and facial swelling.    Eyes: Negative for photophobia, redness and visual disturbance.   Respiratory: Negative for cough and chest tightness.    Cardiovascular: Negative for chest pain, palpitations and leg swelling.   He has lost 30LB  Gastrointestinal: Negative for nausea, diarrhea, constipation, blood in stool and abdominal distention.   Genitourinary: Negative.    Musculoskeletal: Negative.  Reports limited endurance  Skin: Negative.    Neurological: Negative for dizziness, tremors, syncope, weakness, light-headedness and headaches.   Hematological: Does not bruise/bleed easily.   Psychiatric/Behavioral: Negative.        Physical Exam   /71   Pulse 87   Temp 97.4  F (36.3  C)   Resp 18   Ht 1.803 m (5' 11\")   Wt 87.1 kg (192 lb)   SpO2 91%   BMI 26.78 kg/m       Constitutional: Oriented to person, place, and time and appears " well-developed.   HEENT:  Normocephalic and atraumatic.  Eyes: Conjunctivae and EOM are normal. Pupils are equal, round, and reactive to light. No discharge.  No scleral icterus. Nose normal. Mouth/Throat: Oropharynx is clear and moist. No oropharyngeal exudate.    NECK: Normal range of motion. Neck supple. No JVD present. No tracheal deviation present. No thyromegaly present.   CARDIOVASCULAR: Normal rate, regular rhythm and intact distal pulses.  Exam reveals no gallop and no friction rub.  Systolic murmur present.  Has ICD  PULMONARY: Effort normal and breath sounds normal. No respiratory distress.No Wheezing or rales.  ABDOMEN: Soft. Bowel sounds are normal. No distension and no mass.  There is no tenderness. There is no rebound and no guarding. No HSM.  MUSCULOSKELETAL: Normal range of motion. Mild kyphosis, no tenderness.  LYMPH NODES: Has no cervical, supraclavicular, axillary and groin adenopathy.   NEUROLOGICAL: Alert and oriented to person, place, and time. No cranial nerve deficit.  Normal muscle tone. Coordination normal.   GENITOURINARY: Deferred exam.  SKIN: Skin is warm and dry. No rash noted. chronic erythema. Of legs No pallor.   EXTREMITIES: No cyanosis, no clubbing, no edema. No Deformity.  PSYCHIATRIC: Normal mood, affect and behavior.      Lab Results     echo  Indication for study: s/p MitraClip, CHF  Cardiac Rhythm: Regular.Study quality: Fair.    Final Impressions:  1. Moderately increased left ventricular size, normal wall thickness, severely reduced global systolic function, calculated EF of 23 %.  2. Right ventricular cavity size is moderately enlarged, global systolic RV function is moderately reduced.  3. Severely enlarged left atrium.  4. The aortic valve is sclerotic, no stenosis and mild regurgitation.  5. The mitral valve is S/P MitraClip; the clip is well-seated and stable. There is residual moderate mitral regurgitation.  6. 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  7. Mild-moderate tricuspid  regurgitation.  8. The inferior vena cava is dilated, respiratory size variation less than 50%.  9. Moderately increased estimated pulmonary pressures by tricuspid regurgitation velocity and right atrial pressure (54 mmHg plus RAP).  10. No pericardial effusion.          Electronically signed by    Natalia Alfaro MD

## 2023-08-16 NOTE — LETTER
8/16/2023        RE: Malcolm HENDERSON [facility]  [address] [place] [address]        [facility] GERIATRIC [facility]       Patient [facility]  MRN: [id number]        Reason for Visit     Chief Complaint   Patient presents with     Hospital F/U       Code Status     CPR/Full code     Assessment     Acute on chronic HFrEF  Ischaemic cardiomyopathy  MR s/p devorah clip  A Fib   LLE cellulitis  BETH  HTN with low BP  Generalized weakness    Plan     Pt is admitted to TCU for strengthening and rehab.  Pt was admitted with acute CHF  Cardiology was consulted.  Could not tolerate Bumex  Switched to lasix  Metoprolol was stopped  Entresto discontinued due to renal failure  Now on hydralazine and torsemide with aldactone  Barostim placment recommended as outpt  Monitor wt  Edema is resolved  BP remain low   Pt is aware of his limited prognosis and plans to follow-up with cardiology  Monitor for cellulitis-appears to be resolved and  edema is minimal  He has lost 30lb  Recheck labs to monitor renal function  Continue with PT/OT-remains weak and debilitated  Endurance is limited  Aware of poor prognosis and plans to follow-up with cardiology  Inr 2.9 today-continue warfarin    History     Patient is a very pleasant 83 year old male who is admitted to TCU  Pt was admitted with CHF exacerbation  Cardiology was consulted.  He was treated with diuretics  He has lost 30lb  Also had cellulitis of leg treated with abx-this is resolving   Discharged to TCU due to weakness       Past Medical & Surgical History     PAST MEDICAL HISTORY:   Past Medical History:   Diagnosis Date     Contracture of palmar fascia     both hands, mild     Esophageal reflux      Essential hypertension, benign      Generalized osteoarthrosis, unspecified site     L shoulder, r hip..  improved now with exercise, glucosamine     Glaucoma      Hernia, abdominal      Hypertrophy (benign) of prostate     mild slowing     Infection due to 2019 novel  coronavirus 1/28/2023      PAST SURGICAL HISTORY:   has a past surgical history that includes NONSPECIFIC PROCEDURE (5/04); NONSPECIFIC PROCEDURE (1993); Eye surgery; colonoscopy (4/24/2013); ENT surgery; Colonoscopy (4/24/2013); Implant automatic implantable cardioverter defibrillator (2/2014); and hernia repair.      Past Social History     Reviewed,  reports that he quit smoking about 24 years ago. His smoking use included cigarettes. He has never used smokeless tobacco. He reports current alcohol use. He reports that he does not use drugs.    Family History     Reviewed, and family history includes Breast Cancer in his sister; Cardiovascular in his mother; Family History Negative in his father; Thyroid Disease in his maternal aunt.    Medication List     Current Outpatient Medications   Medication     acetaminophen (TYLENOL) 500 MG tablet     albuterol (PROAIR HFA/PROVENTIL HFA/VENTOLIN HFA) 108 (90 Base) MCG/ACT inhaler     amiodarone (PACERONE) 200 MG tablet     Ascorbic Acid (VITAMIN C) 500 MG CAPS     cholecalciferol (VITAMIN D3) 25 mcg (1000 units) capsule     clopidogrel (PLAVIX) 75 MG tablet     cycloSPORINE (RESTASIS) 0.05 % ophthalmic emulsion     DULoxetine (CYMBALTA) 30 MG capsule     Glucosamine-Chondroit-Vit C-Mn (GLUCOSAMINE CHONDR 1500 COMPLX PO)     hydrALAZINE (APRESOLINE) 10 MG tablet     latanoprost (XALATAN) 0.005 % ophthalmic solution     Multiple Vitamin (MULTIVITAMIN OR)     omeprazole (PRILOSEC) 20 MG CR capsule     rosuvastatin (CRESTOR) 10 MG tablet     spironolactone (ALDACTONE) 25 MG tablet     torsemide (DEMADEX) 100 MG tablet     atorvastatin (LIPITOR) 40 MG tablet     bromfenac (BROMDAY) 0.09 % ophthalmic solution     Bromfenac Sodium (BROMDAY) 0.09 % SOLN     bumetanide (BUMEX) 1 MG tablet     Coenzyme Q10 (COQ10) 200 MG CAPS     diclofenac (VOLTAREN) 1 % topical gel     fluticasone (FLONASE) 50 MCG/ACT nasal spray     gabapentin (NEURONTIN) 300 MG capsule     lisinopril (ZESTRIL)  5 MG tablet     Magnesium Oxide 250 MG TABS     metoprolol succinate ER (TOPROL XL) 25 MG 24 hr tablet     ofloxacin (OCUFLOX) 0.3 % ophthalmic solution     potassium chloride SA (K-DUR/KLOR-CON M) 20 MEQ CR tablet     sotalol (BETAPACE) 80 MG tablet     tadalafil (CIALIS) 10 MG tablet     timolol (TIMOPTIC) 0.5 % ophthalmic solution     warfarin ANTICOAGULANT (COUMADIN) 2 MG tablet     warfarin ANTICOAGULANT (COUMADIN) 5 MG tablet     No current facility-administered medications for this visit.      MED REC REQUIRED  Post Medication Reconciliation Status: discharge medications reconciled, continue medications without change       Allergies     Allergies   Allergen Reactions     Carvedilol Other (See Comments)     fatigue     Dorzolamide Other (See Comments)     Other reaction(s): Unknown/Not Verified  Red irritated eye  Red irritated eye       Metoprolol Other (See Comments)     Other reaction(s): Hypotension       Review of Systems   A comprehensive review of 14 systems was done. Pertinent findings noted here and in history of present illness. All the rest negative.  Constitutional: Negative.  Negative for fever, chills, he has  activity change, appetite change and fatigue.   HENT: Negative for congestion and facial swelling.    Eyes: Negative for photophobia, redness and visual disturbance.   Respiratory: Negative for cough and chest tightness.    Cardiovascular: Negative for chest pain, palpitations and leg swelling.   He has lost 30LB  Gastrointestinal: Negative for nausea, diarrhea, constipation, blood in stool and abdominal distention.   Genitourinary: Negative.    Musculoskeletal: Negative.  Reports limited endurance  Skin: Negative.    Neurological: Negative for dizziness, tremors, syncope, weakness, light-headedness and headaches.   Hematological: Does not bruise/bleed easily.   Psychiatric/Behavioral: Negative.        Physical Exam   /71   Pulse 87   Temp 97.4  F (36.3  C)   Resp 18   Ht 1.803 m  "(5' 11\")   Wt 87.1 kg (192 lb)   SpO2 91%   BMI 26.78 kg/m       Constitutional: Oriented to person, place, and time and appears well-developed.   HEENT:  Normocephalic and atraumatic.  Eyes: Conjunctivae and EOM are normal. Pupils are equal, round, and reactive to light. No discharge.  No scleral icterus. Nose normal. Mouth/Throat: Oropharynx is clear and moist. No oropharyngeal exudate.    NECK: Normal range of motion. Neck supple. No JVD present. No tracheal deviation present. No thyromegaly present.   CARDIOVASCULAR: Normal rate, regular rhythm and intact distal pulses.  Exam reveals no gallop and no friction rub.  Systolic murmur present.  Has ICD  PULMONARY: Effort normal and breath sounds normal. No respiratory distress.No Wheezing or rales.  ABDOMEN: Soft. Bowel sounds are normal. No distension and no mass.  There is no tenderness. There is no rebound and no guarding. No HSM.  MUSCULOSKELETAL: Normal range of motion. Mild kyphosis, no tenderness.  LYMPH NODES: Has no cervical, supraclavicular, axillary and groin adenopathy.   NEUROLOGICAL: Alert and oriented to person, place, and time. No cranial nerve deficit.  Normal muscle tone. Coordination normal.   GENITOURINARY: Deferred exam.  SKIN: Skin is warm and dry. No rash noted. chronic erythema. Of legs No pallor.   EXTREMITIES: No cyanosis, no clubbing, no edema. No Deformity.  PSYCHIATRIC: Normal mood, affect and behavior.      Lab Results     echo  Indication for study: s/p MitraClip, CHF  Cardiac Rhythm: Regular.Study quality: Fair.    Final Impressions:  1. Moderately increased left ventricular size, normal wall thickness, severely reduced global systolic function, calculated EF of 23 %.  2. Right ventricular cavity size is moderately enlarged, global systolic RV function is moderately reduced.  3. Severely enlarged left atrium.  4. The aortic valve is sclerotic, no stenosis and mild regurgitation.  5. The mitral valve is S/P MitraClip; the clip is " well-seated and stable. There is residual moderate mitral regurgitation.  6. 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  7. Mild-moderate tricuspid regurgitation.  8. The inferior vena cava is dilated, respiratory size variation less than 50%.  9. Moderately increased estimated pulmonary pressures by tricuspid regurgitation velocity and right atrial pressure (54 mmHg plus RAP).  10. No pericardial effusion.          Electronically signed by    Natalia Alfaro MD                            Sincerely,        SALTY Hansen

## 2023-08-17 PROBLEM — H40.1432 CAPSULAR GLAUCOMA OF BOTH EYES WITH PSEUDOEXFOLIATION (PXF) OF LENS, MODERATE STAGE: Status: ACTIVE | Noted: 2018-08-06

## 2023-08-17 NOTE — LETTER
8/17/2023        RE: Malcolm Barker  [address] [place] [address]        Code Status:  FULL CODE  Visit Type: RECHECK (INITIAL)     Facility:   [facility] ([facility]) [[id number]]        History of Present Illness:   Hospital Admission Date:8/8/2023      Hospital Discharge Date: 8/15/2023      Malcolm Barker is a 83 year old male with past medical history for CAD, ischemic cardiomyopathy, Htn, HLD, GERD, CKD3, neuropathy, HFrEF, s/p CRT-D placement and p. Afib.  He was recently hospitalized for acute on chronic HFrEF.  He was also found to have LLE cellulitis and was treated with IV Ancef and transitioned to Keflex at discharge.  He was seen by cardiology for heart failure and was treated with IV diuretics.  Metoprolol, Entresto Metolazone and sildenafil were all dc'd on recommendations from Cardiology.  He was recommended to undergo Barostim and so was seen by Vascular Surgery.  He is scheduled to have Barostime on 9/26.      Today, he endorses extreme fatigue and has questions on his rehab stay and how he should pace himself. Reports wakes at night due to SOB.  He reports he sleeps flat at night.     Past Medical History:   Diagnosis Date     Contracture of palmar fascia     both hands, mild     Esophageal reflux      Essential hypertension, benign      Generalized osteoarthrosis, unspecified site     L shoulder, r hip..  improved now with exercise, glucosamine     Glaucoma      Hernia, abdominal      Hypertrophy (benign) of prostate     mild slowing     Infection due to 2019 novel coronavirus 1/28/2023     Past Surgical History:   Procedure Laterality Date     [facility]     COLONOSCOPY  4/24/2013    Procedure: COLONOSCOPY;  Colonoscopy        ENT SURGERY      T&A as a child     EYE SURGERY      lright eye lens replacement     HERNIA REPAIR       IMPLANT AUTOMATIC IMPLANTABLE CARDIOVERTER DEFIBRILLATOR  2/2014     ZZC NONSPECIFIC PROCEDURE       colonoscopy      ZZC NONSPECIFIC PROCEDURE      R hernia     Family History   Problem Relation Age of Onset     Cardiovascular Mother          /mi     Breast Cancer Sister      Thyroid Disease Maternal Aunt      Family History Negative Father          from accidental death     Social History     Socioeconomic History     Marital status:      Spouse name: Errol     Number of children: 2     Years of education: Not on file     Highest education level: Not on file   Occupational History     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 Not Asked      Service Not Asked     Blood Transfusions Not Asked     Caffeine Concern No     Occupational Exposure No     Hobby Hazards No     Sleep Concern No     Stress Concern No     Weight Concern No     Special Diet No     Back Care No     Exercise Yes     Comment: 4 x week     Bike Helmet Not Asked     Seat Belt Yes     Self-Exams Not Asked   Social History Narrative     Not on file     Social Determinants of Health     Financial Resource Strain: Low Risk  (2023)    Overall Financial Resource Strain (CARDIA)      Difficulty of Paying Living Expenses: Not very hard   Food Insecurity: No Food Insecurity (2023)    Hunger Vital Sign      Worried About Running Out of Food in the Last Year: Never true      Ran Out of Food in the Last Year: Never true   Transportation Needs: No Transportation Needs (2023)    PRAPARE - Transportation      Lack of Transportation (Medical): No      Lack of Transportation  (Non-Medical): No   Physical Activity: Sufficiently Active (2/27/2023)    Exercise Vital Sign      Days of Exercise per Week: 3 days      Minutes of Exercise per Session: 50 min   Stress: No Stress Concern Present (2/27/2023)    [employer] - Occupational Stress Questionnaire      Feeling of Stress : Not at all   Social Connections: Unknown (2/27/2023)    Social Connection and Isolation Panel [NHANES]      Frequency of Communication with Friends and Family: More than three times a week      Frequency of Social Gatherings with Friends and Family: Not on file      Attends [employer] Services: Patient refused      Active Member of Clubs or Organizations: No      Attends Club or Organization Meetings: Not on file      Marital Status:    Intimate Partner Violence: Not on file   Housing Stability: Low Risk  (2/27/2023)    Housing Stability Vital Sign      Unable to Pay for Housing in the Last Year: No      Number of Places Lived in the Last Year: 1      Unstable Housing in the Last Year: No       Current Outpatient Medications   Medication Sig Dispense Refill     acetaminophen (TYLENOL) 500 MG tablet Take 1-2 tablets (500-1,000 mg) by mouth every 4 hours as needed for mild pain 30 tablet 0     albuterol (PROAIR HFA/PROVENTIL HFA/VENTOLIN HFA) 108 (90 Base) MCG/ACT inhaler Inhale 1-2 puffs into the lungs       amiodarone (PACERONE) 200 MG tablet Take 200 mg by mouth daily       Ascorbic Acid (VITAMIN C) 500 MG CAPS Take 1,000 mg by mouth daily       cholecalciferol (VITAMIN D3) 25 mcg (1000 units) capsule Take 1 capsule by mouth every 24 hours       clopidogrel (PLAVIX) 75 MG tablet Take 1 tablet by mouth daily       cycloSPORINE (RESTASIS) 0.05 % ophthalmic emulsion Place 1 drop into the right eye 2 times daily       DULoxetine (CYMBALTA) 30 MG capsule Take 1 capsule by mouth twice daily 180 capsule 3     Glucosamine-Chondroit-Vit C-Mn (GLUCOSAMINE CHONDR 1500 COMPLX PO) Take by mouth  daily       hydrALAZINE (APRESOLINE) 10 MG tablet Take 20 mg by mouth 3 times daily       latanoprost (XALATAN) 0.005 % ophthalmic solution        Multiple Vitamin (MULTIVITAMIN OR) Take  by mouth.       omeprazole (PRILOSEC) 20 MG CR capsule        rosuvastatin (CRESTOR) 10 MG tablet Take 10 mg by mouth daily       sodium chloride (OCEAN) 0.65 % nasal spray Spray 1 spray into both nostrils daily as needed for congestion       spironolactone (ALDACTONE) 25 MG tablet Take 12.5 mg by mouth daily       torsemide (DEMADEX) 100 MG tablet Take 100 mg by mouth daily       warfarin ANTICOAGULANT (COUMADIN) 5 MG tablet TAKE 1 TABLET BY MOUTH ON MONDAY, WEDNESDAY AND FRIDAY AND  ONE-HALF  TABLET  ALL  OTHER  DAYS  OR  PER  INR  CLINIC (Patient not taking: Reported on 8/16/2023) 78 tablet 1     Allergies   Allergen Reactions     Carvedilol Other (See Comments)     fatigue     Dorzolamide Other (See Comments)     Other reaction(s): Unknown/Not Verified  Red irritated eye  Red irritated eye       Metoprolol Other (See Comments)     Other reaction(s): Hypotension     Immunization History   Administered Date(s) Administered     COVID-19 Bivalent 12+ (Pfizer) 09/15/2022, 06/15/2023     COVID-19 Bivalent 18+ (Moderna) 09/15/2022     COVID-19 MONOVALENT 12+ (Pfizer) 01/30/2021, 02/20/2021, 09/07/2021     COVID-19 Monovalent 12+ (Pfizer 2022) 04/02/2022     HEPA 05/12/1997, 01/17/2007, 08/29/2007     HepB 05/12/1997, 01/17/2007, 08/29/2007     HepB, Unspecified 05/12/1997, 01/17/2007, 08/29/2007     Influenza (H1N1) 01/08/2010     Influenza (High Dose) 3 valent vaccine 10/05/2012, 09/13/2013, 10/15/2013, 10/03/2014, 10/15/2014, 09/22/2016, 10/15/2017, 09/20/2018, 09/25/2019, 09/03/2020     Influenza (IIV3) PF 11/12/2002, 11/13/2003, 12/01/2004, 10/25/2005, 12/18/2006, 10/30/2007, 10/23/2008, 10/10/2011     Influenza Vaccine 65+ (Fluzone HD) 10/15/2014, 09/20/2018, 09/25/2019, 09/03/2020, 09/07/2021, 09/15/2022     Influenza Vaccine >6  "months (Alfuria,Fluzone) 10/19/2015     Influenza, Whole Virus 09/15/2010     Pneumo Conj 13-V (2010&after) 04/06/2015     Pneumococcal 23 valent 08/30/2000, 12/18/2006     Poliovirus, inactivated (IPV) 05/12/1997     TD,PF 7+ (Tenivac) 01/13/1995, 03/14/2006, 07/24/2021     TDAP Vaccine (Adacel) 03/18/2014     Typhoid IM 01/17/2007, 12/09/2009, 01/26/2012     Yellow Fever 12/09/2009     Zoster recombinant adjuvanted (SHINGRIX) 04/09/2019, 08/13/2019     Zoster vaccine, live 03/20/2013         Post Discharge Medication Reconciliation Status: discharge medications reconciled, continue medications without change.    Medications list and allergies in the facility chart have been reviewed.  Please see facility EMR for most up to date list.         Review of Systems   Patient denies fever, chills, headache, lightheadedness, dizziness, rhinorrhea, cough, congestion, shortness of breath, chest pain, palpitations, abdominal pain, n/v, diarrhea, constipation, change in appetite, dysuria, frequency, burning or pain with urination.  Other than stated in HPI all other review of systems is negative.       Physical Exam  Vital signs:/68   Pulse 76   Temp 97.9  F (36.6  C)   Resp 18   Ht 1.803 m (5' 11\")   Wt 80.7 kg (178 lb)   SpO2 93%   BMI 24.83 kg/m     GENERAL APPEARANCE: Well developed, well nourished, in no acute distress.  HEENT: normocephalic, atraumatic  sclerae anicteric, conjunctivae clear and moist, EOM intact  LUNGS: Lung sounds CTA, no adventitious sounds, respiratory effort normal.  CARD: RRR, S1, S2, without murmurs, gallops, rubs  ABD: Soft, nondistended and nontender with normal bowel sounds.   MSK: Muscle strength and tone were equal bilaterally. Moves all extremities easily and intentionally.   EXTREMITIES: LLE 1+ pitting edema.   NEURO: Alert and oriented x 3. . Face is symmetric.  SKIN: Inspection of the skin reveals no rashes, ulcerations or petechiae.  PSYCH: euthymic        Labs:    SODIUM " 137 136 - 145 mmol/L   08/13/2023 5:35 AM [facility]     POTASSIUM 3.7 3.5 - 5.1 mmol/L   [facility]     CHLORIDE 89 (L) 98 - 107 mmol/L   08[facility]13[facility]2023 5[facility]     CO2,TOTAL 36 (H) 22 - 29 mmol/L   08/13/2023 5:35 AM [facility]     ANION GAP 12 5 - 18   [facility]13[facility]2023 5[facility]     GLUCOSE 108 (H) 70 - 99 mg/dL   08[facility]13[facility]2023 5[facility]     CALCIUM 9.4 8.8 - 10.2 mg/dL   08[facility]13[facility]2023 5[facility]     BUN 37 (H) 8 - 23 mg/dL   08[facility]13[facility]2023 5[facility]     CREATININE 1.81 (H) 0.70 - 1.20 mg/dL   08[facility]13/2023 5[facility]     BUN/CREAT RATIO 20 10 - 20   08/13/2023 5:35 [facility]     eGFR 37 (L) >90 mL/min/1.73m2          PRO-BNP 22,632 (H) <450 pg/mL         Assessment/plan:     Chronic systolic congestive heart failure (H)  Compensated, check weights MWF, continue with spironolactone and torsemide.  Follow up with Vascular surgery for Barostip on 9/26. Counseled patient on endurance and to not push his rehab.  Recommended that he rest between exertions.     Coronary artery disease involving native coronary artery of native heart without angina pectoris  No chest pain, follow up with cardiology.  Continue with Statin and plavix    Essential hypertension, benign  Controlled, continue with hydralazine and diuretics.     Chronic atrial fibrillation (H)  Rate controlled with home amiodarone.  On warfarin for AC.  INR 8/21    Idiopathic progressive neuropathy  Controlled, on no medications.  Monitor.     Dyslipidemia  On rosuvastatin, defer to PCP for management.      45 total minutes spent in reviewing  medical records from [facility], assessing patient, and coordinating care with nursing.         Electronically signed by: Eleanor Vásquez NP      Sincerely,        Eleanor Vásquez, NP

## 2023-08-18 NOTE — TELEPHONE ENCOUNTER
[facility] Triage Nurse Telephone Encounter    Provider: ALETHEA Leiva  Facility: [facility]  Facility Type:  TCU    Caller: Brittney   Call Back Number: [phone]    Allergies:    Allergies   Allergen Reactions    Carvedilol Other (See Comments)     fatigue    Dorzolamide Other (See Comments)     Other reaction(s): Unknown/Not Verified  Red irritated eye  Red irritated eye      Metoprolol Other (See Comments)     Other reaction(s): Hypotension        Reason for call: Patient she has a as needed inhaler and only like to do that at side and self administer.  Patient is alert and orientated and nursing feels he is appropriate for self administering medications.    Verbal Order/Direction given by Provider: Okay to self administer inhaler and keep at bedside.    Provider giving Order:  ALETHEA Brooks    Verbal Order given to: Brittney Gomez RN

## 2023-08-18 NOTE — PROGRESS NOTES
Code Status:  FULL CODE  Visit Type: RECHECK (INITIAL)     Facility:   [facility] ([facility]) [[id number]]        History of Present Illness:   Hospital Admission Date:8/8/2023      Hospital Discharge Date: 8/15/2023      Malcolm Barker is a 83 year old male with past medical history for CAD, ischemic cardiomyopathy, Htn, HLD, GERD, CKD3, neuropathy, HFrEF, s/p CRT-D placement and p. Afib.  He was recently hospitalized for acute on chronic HFrEF.  He was also found to have LLE cellulitis and was treated with IV Ancef and transitioned to Keflex at discharge.  He was seen by cardiology for heart failure and was treated with IV diuretics.  Metoprolol, Entresto Metolazone and sildenafil were all dc'd on recommendations from Cardiology.  He was recommended to undergo Barostim and so was seen by Vascular Surgery.  He is scheduled to have Barostime on 9/26.      Today, he endorses extreme fatigue and has questions on his rehab stay and how he should pace himself. Reports wakes at night due to SOB.  He reports he sleeps flat at night.     Past Medical History:   Diagnosis Date    Contracture of palmar fascia     both hands, mild    Esophageal reflux     Essential hypertension, benign     Generalized osteoarthrosis, unspecified site     L shoulder, r hip..  improved now with exercise, glucosamine    Glaucoma     Hernia, abdominal     Hypertrophy (benign) of prostate     mild slowing    Infection due to 2019 novel coronavirus 1/28/2023     Past Surgical History:   Procedure Laterality Date    [facility]OSCOPY  4/24/2013    Procedure: COLONOSCOPY;  Colonoscopy       ENT SURGERY      T&A as a child    EYE SURGERY      lright eye lens replacement    HERNIA REPAIR      IMPLANT AUTOMATIC IMPLANTABLE CARDIOVERTER DEFIBRILLATOR  2/2014    [facility] NONSPECIFIC PROCEDURE  5/04    colonoscopy     [facility] NONSPECIFIC PROCEDURE  1993    R hernia     Family History   Problem Relation  Age of Onset    Cardiovascular Mother          /mi    Breast Cancer Sister     Thyroid Disease Maternal Aunt     Family History Negative Father          from accidental death     Social History     Socioeconomic History    Marital status:      Spouse name: Errol    Number of children: 2    Years of education: Not on file    Highest education level: Not on file   Occupational History    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 Not Asked     Service Not Asked    Blood Transfusions Not Asked    Caffeine Concern No    Occupational Exposure No    Hobby Hazards No    Sleep Concern No    Stress Concern No    Weight Concern No    Special Diet No    Back Care No    Exercise Yes     Comment: 4 x week    Bike Helmet Not Asked    Seat Belt Yes    Self-Exams Not Asked   Social History Narrative    Not on file     Social Determinants of Health     Financial Resource Strain: Low Risk  (2023)    Overall Financial Resource Strain (CARDIA)     Difficulty of Paying Living Expenses: Not very hard   Food Insecurity: No Food Insecurity (2023)    Hunger Vital Sign     Worried About Running Out of Food in the Last Year: Never true     Ran Out of Food in the Last Year: Never true   Transportation Needs: No Transportation Needs (2023)    PRAPARE - Transportation     Lack of Transportation (Medical): No     Lack of Transportation (Non-Medical): No   Physical Activity: Sufficiently Active (2023)    Exercise Vital Sign     Days of Exercise per Week: 3 days     Minutes of Exercise  per Session: 50 min   Stress: No Stress Concern Present (2/27/2023)    [employer] [place] [employer] - Occupational Stress Questionnaire     Feeling of Stress : Not at all   Social Connections: Unknown (2/27/2023)    Social Connection and Isolation Panel [NHANES]     Frequency of Communication with Friends and Family: More than three times a week     Frequency of Social Gatherings with Friends and Family: Not on file     Attends Pentec[employer]al Services: Patient refused     Active Member of Clubs or Organizations: No     Attends Club or Organization Meetings: Not on file     Marital Status:    Intimate Partner Violence: Not on file   Housing Stability: Low Risk  (2/27/2023)    Housing Stability Vital Sign     Unable to Pay for Housing in the Last Year: No     Number of Places Lived in the Last Year: 1     Unstable Housing in the Last Year: No       Current Outpatient Medications   Medication Sig Dispense Refill    acetaminophen (TYLENOL) 500 MG tablet Take 1-2 tablets (500-1,000 mg) by mouth every 4 hours as needed for mild pain 30 tablet 0    albuterol (PROAIR HFA/PROVENTIL HFA/VENTOLIN HFA) 108 (90 Base) MCG/ACT inhaler Inhale 1-2 puffs into the lungs      amiodarone (PACERONE) 200 MG tablet Take 200 mg by mouth daily      Ascorbic Acid (VITAMIN C) 500 MG CAPS Take 1,000 mg by mouth daily      cholecalciferol (VITAMIN D3) 25 mcg (1000 units) capsule Take 1 capsule by mouth every 24 hours      clopidogrel (PLAVIX) 75 MG tablet Take 1 tablet by mouth daily      cycloSPORINE (RESTASIS) 0.05 % ophthalmic emulsion Place 1 drop into the right eye 2 times daily      DULoxetine (CYMBALTA) 30 MG capsule Take 1 capsule by mouth twice daily 180 capsule 3    Glucosamine-Chondroit-Vit C-Mn (GLUCOSAMINE CHONDR 1500 COMPLX PO) Take by mouth daily      hydrALAZINE (APRESOLINE) 10 MG tablet Take 20 mg by mouth 3 times daily      latanoprost (XALATAN) 0.005 % ophthalmic solution       Multiple Vitamin (MULTIVITAMIN OR)  Take  by mouth.      omeprazole (PRILOSEC) 20 MG CR capsule       rosuvastatin (CRESTOR) 10 MG tablet Take 10 mg by mouth daily      sodium chloride (OCEAN) 0.65 % nasal spray Spray 1 spray into both nostrils daily as needed for congestion      spironolactone (ALDACTONE) 25 MG tablet Take 12.5 mg by mouth daily      torsemide (DEMADEX) 100 MG tablet Take 100 mg by mouth daily      warfarin ANTICOAGULANT (COUMADIN) 5 MG tablet TAKE 1 TABLET BY MOUTH ON MONDAY, WEDNESDAY AND FRIDAY AND  ONE-HALF  TABLET  ALL  OTHER  DAYS  OR  PER  INR  CLINIC (Patient not taking: Reported on 8/16/2023) 78 tablet 1     Allergies   Allergen Reactions    Carvedilol Other (See Comments)     fatigue    Dorzolamide Other (See Comments)     Other reaction(s): Unknown/Not Verified  Red irritated eye  Red irritated eye      Metoprolol Other (See Comments)     Other reaction(s): Hypotension     Immunization History   Administered Date(s) Administered    COVID-19 Bivalent 12+ (Pfizer) 09/15/2022, 06/15/2023    COVID-19 Bivalent 18+ (Moderna) 09/15/2022    COVID-19 MONOVALENT 12+ (Pfizer) 01/30/2021, 02/20/2021, 09/07/2021    COVID-19 Monovalent 12+ (Pfizer 2022) 04/02/2022    HEPA 05/12/1997, 01/17/2007, 08/29/2007    HepB 05/12/1997, 01/17/2007, 08/29/2007    HepB, Unspecified 05/12/1997, 01/17/2007, 08/29/2007    Influenza (H1N1) 01/08/2010    Influenza (High Dose) 3 valent vaccine 10/05/2012, 09/13/2013, 10/15/2013, 10/03/2014, 10/15/2014, 09/22/2016, 10/15/2017, 09/20/2018, 09/25/2019, 09/03/2020    Influenza (IIV3) PF 11/12/2002, 11/13/2003, 12/01/2004, 10/25/2005, 12/18/2006, 10/30/2007, 10/23/2008, 10/10/2011    Influenza Vaccine 65+ (Fluzone HD) 10/15/2014, 09/20/2018, 09/25/2019, 09/03/2020, 09/07/2021, 09/15/2022    Influenza Vaccine >6 months (Chris Allen) 10/19/2015    Influenza, Whole Virus 09/15/2010    Pneumo Conj 13-V (2010&after) 04/06/2015    Pneumococcal 23 valent 08/30/2000, 12/18/2006    Poliovirus, inactivated (IPV)  "05/12/1997    TD,PF 7+ (Tenivac) 01/13/1995, 03/14/2006, 07/24/2021    TDAP Vaccine (Adacel) 03/18/2014    Typhoid IM 01/17/2007, 12/09/2009, 01/26/2012    Yellow Fever 12/09/2009    Zoster recombinant adjuvanted (SHINGRIX) 04/09/2019, 08/13/2019    Zoster vaccine, live 03/20/2013         Post Discharge Medication Reconciliation Status: discharge medications reconciled, continue medications without change.    Medications list and allergies in the facility chart have been reviewed.  Please see facility EMR for most up to date list.         Review of Systems   Patient denies fever, chills, headache, lightheadedness, dizziness, rhinorrhea, cough, congestion, shortness of breath, chest pain, palpitations, abdominal pain, n/v, diarrhea, constipation, change in appetite, dysuria, frequency, burning or pain with urination.  Other than stated in HPI all other review of systems is negative.       Physical Exam  Vital signs:/68   Pulse 76   Temp 97.9  F (36.6  C)   Resp 18   Ht 1.803 m (5' 11\")   Wt 80.7 kg (178 lb)   SpO2 93%   BMI 24.83 kg/m     GENERAL APPEARANCE: Well developed, well nourished, in no acute distress.  HEENT: normocephalic, atraumatic  sclerae anicteric, conjunctivae clear and moist, EOM intact  LUNGS: Lung sounds CTA, no adventitious sounds, respiratory effort normal.  CARD: RRR, S1, S2, without murmurs, gallops, rubs  ABD: Soft, nondistended and nontender with normal bowel sounds.   MSK: Muscle strength and tone were equal bilaterally. Moves all extremities easily and intentionally.   EXTREMITIES: LLE 1+ pitting edema.   NEURO: Alert and oriented x 3. . Face is symmetric.  SKIN: Inspection of the skin reveals no rashes, ulcerations or petechiae.  PSYCH: euthymic        Labs:    SODIUM 137 136 - 145 mmol/L   08/13/2023 5:35 AM [facility]-CENTRAL LABORATORY     POTASSIUM 3.7 3.5 - 5.1 mmol/L   08/13/2023 5:35 AM [facility] LABORATORY-CENTRAL LABORATORY     CHLORIDE 89 " (L) 98 - 107 mmol/L   08/13/2023 5:35 AM [facility] LABORATORY-CENTRAL LABORATORY     CO2,TOTAL 36 (H) 22 - 29 mmol/L   08/13/2023 5:35 AM [facility]-CENTRAL LABORATORY     ANION GAP 12 5 - 18   08/13/2023 5:35 AM [facility]-CENTRAL LABORATORY     GLUCOSE 108 (H) 70 - 99 mg/dL   08/13/2023 5:35 AM [facility]-CENTRAL LABORATORY     CALCIUM 9.4 8.8 - 10.2 mg/dL   08/13/2023 5:35 AM [facility]-CENTRAL LABORATORY     BUN 37 (H) 8 - 23 mg/dL   08/13/2023 5:35 AM [facility]-CENTRAL LABORATORY     CREATININE 1.81 (H) 0.70 - 1.20 mg/dL   08/13/2023 5:35 AM [facility]-CENTRAL LABORATORY     BUN/CREAT RATIO 20 10 - 20   08/13/2023 5:35 AM [facility]-CENTRAL LABORATORY     eGFR 37 (L) >90 mL/min/1.73m2          PRO-BNP 22,632 (H) <450 pg/mL         Assessment/plan:     Chronic systolic congestive heart failure (H)  Compensated, check weights MWF, continue with spironolactone and torsemide.  Follow up with Vascular surgery for Barostip on 9/26. Counseled patient on endurance and to not push his rehab.  Recommended that he rest between exertions.     Coronary artery disease involving native coronary artery of native heart without angina pectoris  No chest pain, follow up with cardiology.  Continue with Statin and plavix    Essential hypertension, benign  Controlled, continue with hydralazine and diuretics.     Chronic atrial fibrillation (H)  Rate controlled with home amiodarone.  On warfarin for AC.  INR 8/21    Idiopathic progressive neuropathy  Controlled, on no medications.  Monitor.     Dyslipidemia  On rosuvastatin, defer to PCP for management.      45 total minutes spent in reviewing medical records from [facility], assessing patient, and coordinating care with nursing.         Electronically signed by: Eleanor Vásquez NP

## 2023-08-21 NOTE — TELEPHONE ENCOUNTER
[facility] Lab Note     Provider: ALETHEA Leiva  Facility: [facility]  Facility Type:  TCU    Allergies   Allergen Reactions    Carvedilol Other (See Comments)     fatigue    Dorzolamide Other (See Comments)     Other reaction(s): Unknown/Not Verified  Red irritated eye  Red irritated eye      Metoprolol Other (See Comments)     Other reaction(s): Hypotension       Labs Reviewed by provider: Heme 2, BMP, BNP, Mg, HgbA1c.  INR draw missed today.      Verbal Order/Direction given by Provider: Warfarin 2mg today.  Check INR on 8/22/23.  Potassium 40meq STAT and then 40meq x 1 dose at HS.  Starting 8/22, give potassium 40meq daily.  Check potassium level on 8/22/23.      Provider giving Order:  ALETHEA Leiva    Verbal Order given to: Courtney([phone])    Madhu Erickson RN

## 2023-08-22 NOTE — LETTER
8/22/2023        RE: Malcolm Barker  [address] [place] [address]                                                                                                            [facility]      Code Status:  FULL CODE  Visit Type: Discharge Summary Nursing Home     Facility:   [facility] ([facility]) [[id number]]  PCP:  Tod Callahan  [phone]       Admission Date to our Facility: 8/15/2023  Discharge Date from our Facility: 8/24/2023    Discharge Diagnosis:    Chronic systolic congestive heart failure (H)  Coronary artery disease involving native coronary artery of native heart without angina pectoris  Essential hypertension, benign  Chronic atrial fibrillation (H)  Idiopathic progressive neuropathy      History of Present Illness: Malcolm Barker is a 83 year old male  with past medical history for CAD, ischemic cardiomyopathy, Htn, HLD, GERD, CKD3, neuropathy, HFrEF, s/p CRT-D placement and p. Afib.  He was recently hospitalized for acute on chronic HFrEF.  He was also found to have LLE cellulitis and was treated with IV Ancef and transitioned to Keflex at discharge.  He was seen by cardiology for heart failure and was treated with IV diuretics.  Metoprolol, Entresto Metolazone and sildenafil were all dc'd on recommendations from Cardiology.  He was recommended to undergo Barostim and so was seen by Vascular Surgery.  He is scheduled to have Barostime on 9/26.       Skilled Nursing Facility Course:  while at the [facility] he improved his endurance and strength and is using a walker to ambulate independently.     Chronic systolic congestive heart failure (H)  Compensated, weights are stable, yesterday K was 2.9 and he was given KCL and started on daily KCL.He did state he has some nausea yesterday which has resolved today.  He states he is typically on 40meq which is what he was restarted on.  He continues on spironolactone and torsemide.  Awaiting recheck potassium  today.  Follow up with vascular surgery for barostim on 9/26.      Coronary artery disease involving native coronary artery of native heart without angina pectoris  No chest pain, follow up with cardiology.  Continue with Statin and plavix     Essential hypertension, benign  Controlled, continue with hydralazine and diuretics.      Chronic atrial fibrillation (H)  Rate controlled with home amiodarone.  On warfarin for AC.  INR 8/21     Idiopathic progressive neuropathy  Controlled, on no medications.  Monitor.      Dyslipidemia  On rosuvastatin, defer to PCP for management.       Discharge Plan:  Stable to discharge to home with home care services.  Advised to have PCP check K  next week.  Follow up with cardiology as directed, next week.     Discharge Medications:   Current Outpatient Medications   Medication Sig Dispense Refill     acetaminophen (TYLENOL) 500 MG tablet Take 1-2 tablets (500-1,000 mg) by mouth every 4 hours as needed for mild pain 30 tablet 0     albuterol (PROAIR HFA/PROVENTIL HFA/VENTOLIN HFA) 108 (90 Base) MCG/ACT inhaler Inhale 1-2 puffs into the lungs       amiodarone (PACERONE) 200 MG tablet Take 200 mg by mouth daily       Ascorbic Acid (VITAMIN C) 500 MG CAPS Take 1,000 mg by mouth daily       cholecalciferol (VITAMIN D3) 25 mcg (1000 units) capsule Take 1 capsule by mouth every 24 hours       clopidogrel (PLAVIX) 75 MG tablet Take 1 tablet by mouth daily       cycloSPORINE (RESTASIS) 0.05 % ophthalmic emulsion Place 1 drop into the right eye 2 times daily       DULoxetine (CYMBALTA) 30 MG capsule Take 1 capsule by mouth twice daily 180 capsule 3     Glucosamine-Chondroit-Vit C-Mn (GLUCOSAMINE CHONDR 1500 COMPLX PO) Take by mouth daily       hydrALAZINE (APRESOLINE) 10 MG tablet Take 20 mg by mouth 3 times daily       latanoprost (XALATAN) 0.005 % ophthalmic solution        Multiple Vitamin (MULTIVITAMIN OR) Take  by mouth.       pantoprazole (PROTONIX) 40 MG EC tablet Take 40 mg by mouth  "daily       potassium chloride ER (KLOR-CON M) 20 MEQ CR tablet Take 40 mEq by mouth daily       rosuvastatin (CRESTOR) 10 MG tablet Take 10 mg by mouth daily       sodium chloride (OCEAN) 0.65 % nasal spray Spray 1 spray into both nostrils daily as needed for congestion       spironolactone (ALDACTONE) 25 MG tablet Take 12.5 mg by mouth daily       torsemide (DEMADEX) 100 MG tablet Take 100 mg by mouth daily       omeprazole (PRILOSEC) 20 MG CR capsule  (Patient not taking: Reported on 8/22/2023)       WARFARIN SODIUM PO 8/21/23 INR missed.  Take 2mg on 8/21.  Next INR 8/22/23.         Review of Systems   Patient denies fever, chills, headache, lightheadedness, dizziness, rhinorrhea, cough, congestion, shortness of breath, chest pain, palpitations, abdominal pain, n/v, diarrhea, constipation, change in appetite, change in sleep pattern, dysuria, frequency, burning or pain with urination.  Other than stated in HPI all other review of systems is negative.       Physical Exam  Vital signs:/68   Pulse 76   Temp 97.9  F (36.6  C)   Resp 18   Ht 1.803 m (5' 11\")   Wt 79.4 kg (175 lb)   SpO2 93%   BMI 24.41 kg/m     GENERAL APPEARANCE: thin, elderly male, in no acute distress.  HEENT: normocephalic, atraumatic  sclerae anicteric, conjunctivae clear and moist, EOM intact  LUNGS: Lung sounds CTA, no adventitious sounds, respiratory effort normal.  CARD: RRR, S1, S2, without murmurs, gallops, rubs  ABD: Soft, nondistended and nontender with normal bowel sounds.   MSK: Muscle strength and tone were equal bilaterally. Moves all extremities easily and intentionally.   EXTREMITIES: No cyanosis, clubbing or edema.  NEURO: Alert and oriented x 3. Face is symmetric.  SKIN: Inspection of the skin reveals no rashes, ulcerations or petechiae.  PSYCH: euthymic        Labs:    Last Comprehensive Metabolic Panel:  Lab Results   Component Value Date     08/21/2023    POTASSIUM 2.9 (L) 08/21/2023    CHLORIDE 93 (L) " 08/21/2023    CO2 26 08/21/2023    ANIONGAP 17 (H) 08/21/2023     (H) 08/21/2023    BUN 36.8 (H) 08/21/2023    CR 1.58 (H) 08/21/2023    GFRESTIMATED 43 (L) 08/21/2023    NOHEMY 9.5 08/21/2023       Lab Results   Component Value Date    WBC 7.0 08/21/2023    WBC 5.8 04/13/2021     Lab Results   Component Value Date    RBC 4.05 08/21/2023    RBC 4.21 04/13/2021     Lab Results   Component Value Date    HGB 12.8 08/21/2023    HGB 13.9 04/13/2021     Lab Results   Component Value Date    HCT 41.0 08/21/2023    HCT 41.9 04/13/2021     Lab Results   Component Value Date     08/21/2023     04/13/2021     Lab Results   Component Value Date    MCH 31.6 08/21/2023    MCH 33.0 04/13/2021     Lab Results   Component Value Date    MCHC 31.2 08/21/2023    MCHC 33.2 04/13/2021     Lab Results   Component Value Date    RDW 14.8 08/21/2023    RDW 12.6 04/13/2021     Lab Results   Component Value Date     08/21/2023     04/13/2021           MEDICAL EQUIPMENT NEEDS:  NA      DISCHARGE PLAN/FACE TO FACE:  I certify that services are/were furnished while this patient was under the care of a physician and that a physician or an allowed non-physician practitioner (NPP), had a face-to-face encounter that meets the physician face-to-face encounter requirements. The encounter was in whole, or in part, related to the primary reason for home health. The patient is confined to his/her home and needs intermittent skilled nursing, physical therapy, speech-language pathology, or the continued need for occupational therapy. A plan of care has been established by a physician and is periodically reviewed by a physician.    I certify that this patient is under my care and that I, or a nurse practitioner or physician's assistant working with me, had a face-to-face encounter that meets the physician face-to-face encounter requirements with this patient.   Date of Face-to-Face Encounter: 8/22/2023    I certify that, based  on my findings, the following services are medically necessary home health services: PT/OT    My clinical findings support the need for the above skilled services because: PT/OT for ongoing strengthening and endurance    This patient is homebound because:  He requires maximum effort in order to get out into the community on a regular basis.     The patient is, or has been, under my care and I have initiated the establishment of the plan of care. This patient will be followed by a physician who will periodically review the plan of care.    35 total minutes spent with patient and SW in coordinating his discharge plan of care.     Electronically signed by: Eleanor Vásquez NP      Sincerely,        Eleanor Vásquez NP

## 2023-08-22 NOTE — PROGRESS NOTES
[facility] GERIATRICS      Code Status:  FULL CODE  Visit Type: Discharge Summary Nursing Home     Facility:   [facility] ([facility]) [[id number]]  PCP:  Tod Callahan  [phone]       Admission Date to our Facility: 8/15/2023  Discharge Date from our Facility: 8/24/2023    Discharge Diagnosis:    Chronic systolic congestive heart failure (H)  Coronary artery disease involving native coronary artery of native heart without angina pectoris  Essential hypertension, benign  Chronic atrial fibrillation (H)  Idiopathic progressive neuropathy      History of Present Illness: Malcolm Barker is a 83 year old male  with past medical history for CAD, ischemic cardiomyopathy, Htn, HLD, GERD, CKD3, neuropathy, HFrEF, s/p CRT-D placement and p. Afib.  He was recently hospitalized for acute on chronic HFrEF.  He was also found to have LLE cellulitis and was treated with IV Ancef and transitioned to Keflex at discharge.  He was seen by cardiology for heart failure and was treated with IV diuretics.  Metoprolol, Entresto Metolazone and sildenafil were all dc'd on recommendations from Cardiology.  He was recommended to undergo Barostim and so was seen by Vascular Surgery.  He is scheduled to have Barostime on 9/26.       Skilled Nursing Facility Course:  while at the U he improved his endurance and strength and is using a walker to ambulate independently.     Chronic systolic congestive heart failure (H)  Compensated, weights are stable, yesterday K was 2.9 and he was given KCL and started on daily KCL.He did state he has some nausea yesterday which has resolved today.  He states he is typically on 40meq which is what he was restarted on.  He continues on spironolactone and torsemide.  Awaiting recheck potassium today.  Follow up with vascular surgery for barostim on 9/26.      Coronary artery disease  involving native coronary artery of native heart without angina pectoris  No chest pain, follow up with cardiology.  Continue with Statin and plavix     Essential hypertension, benign  Controlled, continue with hydralazine and diuretics.      Chronic atrial fibrillation (H)  Rate controlled with home amiodarone.  On warfarin for AC.  INR 8/21     Idiopathic progressive neuropathy  Controlled, on no medications.  Monitor.      Dyslipidemia  On rosuvastatin, defer to PCP for management.       Discharge Plan:  Stable to discharge to home with home care services.  Advised to have PCP check K  next week.  Follow up with cardiology as directed, next week.     Discharge Medications:   Current Outpatient Medications   Medication Sig Dispense Refill    acetaminophen (TYLENOL) 500 MG tablet Take 1-2 tablets (500-1,000 mg) by mouth every 4 hours as needed for mild pain 30 tablet 0    albuterol (PROAIR HFA/PROVENTIL HFA/VENTOLIN HFA) 108 (90 Base) MCG/ACT inhaler Inhale 1-2 puffs into the lungs      amiodarone (PACERONE) 200 MG tablet Take 200 mg by mouth daily      Ascorbic Acid (VITAMIN C) 500 MG CAPS Take 1,000 mg by mouth daily      cholecalciferol (VITAMIN D3) 25 mcg (1000 units) capsule Take 1 capsule by mouth every 24 hours      clopidogrel (PLAVIX) 75 MG tablet Take 1 tablet by mouth daily      cycloSPORINE (RESTASIS) 0.05 % ophthalmic emulsion Place 1 drop into the right eye 2 times daily      DULoxetine (CYMBALTA) 30 MG capsule Take 1 capsule by mouth twice daily 180 capsule 3    Glucosamine-Chondroit-Vit C-Mn (GLUCOSAMINE CHONDR 1500 COMPLX PO) Take by mouth daily      hydrALAZINE (APRESOLINE) 10 MG tablet Take 20 mg by mouth 3 times daily      latanoprost (XALATAN) 0.005 % ophthalmic solution       Multiple Vitamin (MULTIVITAMIN OR) Take  by mouth.      pantoprazole (PROTONIX) 40 MG EC tablet Take 40 mg by mouth daily      potassium chloride ER (KLOR-CON M) 20 MEQ CR tablet Take 40 mEq by mouth daily       "rosuvastatin (CRESTOR) 10 MG tablet Take 10 mg by mouth daily      sodium chloride (OCEAN) 0.65 % nasal spray Spray 1 spray into both nostrils daily as needed for congestion      spironolactone (ALDACTONE) 25 MG tablet Take 12.5 mg by mouth daily      torsemide (DEMADEX) 100 MG tablet Take 100 mg by mouth daily      omeprazole (PRILOSEC) 20 MG CR capsule  (Patient not taking: Reported on 8/22/2023)      WARFARIN SODIUM PO 8/21/23 INR missed.  Take 2mg on 8/21.  Next INR 8/22/23.         Review of Systems   Patient denies fever, chills, headache, lightheadedness, dizziness, rhinorrhea, cough, congestion, shortness of breath, chest pain, palpitations, abdominal pain, n/v, diarrhea, constipation, change in appetite, change in sleep pattern, dysuria, frequency, burning or pain with urination.  Other than stated in HPI all other review of systems is negative.       Physical Exam  Vital signs:/68   Pulse 76   Temp 97.9  F (36.6  C)   Resp 18   Ht 1.803 m (5' 11\")   Wt 79.4 kg (175 lb)   SpO2 93%   BMI 24.41 kg/m     GENERAL APPEARANCE: thin, elderly male, in no acute distress.  HEENT: normocephalic, atraumatic  sclerae anicteric, conjunctivae clear and moist, EOM intact  LUNGS: Lung sounds CTA, no adventitious sounds, respiratory effort normal.  CARD: RRR, S1, S2, without murmurs, gallops, rubs  ABD: Soft, nondistended and nontender with normal bowel sounds.   MSK: Muscle strength and tone were equal bilaterally. Moves all extremities easily and intentionally.   EXTREMITIES: No cyanosis, clubbing or edema.  NEURO: Alert and oriented x 3. Face is symmetric.  SKIN: Inspection of the skin reveals no rashes, ulcerations or petechiae.  PSYCH: euthymic        Labs:    Last Comprehensive Metabolic Panel:  Lab Results   Component Value Date     08/21/2023    POTASSIUM 2.9 (L) 08/21/2023    CHLORIDE 93 (L) 08/21/2023    CO2 26 08/21/2023    ANIONGAP 17 (H) 08/21/2023     (H) 08/21/2023    BUN 36.8 (H) " 08/21/2023    CR 1.58 (H) 08/21/2023    GFRESTIMATED 43 (L) 08/21/2023    NOHEMY 9.5 08/21/2023       Lab Results   Component Value Date    WBC 7.0 08/21/2023    WBC 5.8 04/13/2021     Lab Results   Component Value Date    RBC 4.05 08/21/2023    RBC 4.21 04/13/2021     Lab Results   Component Value Date    HGB 12.8 08/21/2023    HGB 13.9 04/13/2021     Lab Results   Component Value Date    HCT 41.0 08/21/2023    HCT 41.9 04/13/2021     Lab Results   Component Value Date     08/21/2023     04/13/2021     Lab Results   Component Value Date    MCH 31.6 08/21/2023    MCH 33.0 04/13/2021     Lab Results   Component Value Date    MCHC 31.2 08/21/2023    MCHC 33.2 04/13/2021     Lab Results   Component Value Date    RDW 14.8 08/21/2023    RDW 12.6 04/13/2021     Lab Results   Component Value Date     08/21/2023     04/13/2021           MEDICAL EQUIPMENT NEEDS:  NA      DISCHARGE PLAN/FACE TO FACE:  I certify that services are/were furnished while this patient was under the care of a physician and that a physician or an allowed non-physician practitioner (NPP), had a face-to-face encounter that meets the physician face-to-face encounter requirements. The encounter was in whole, or in part, related to the primary reason for home health. The patient is confined to his/her home and needs intermittent skilled nursing, physical therapy, speech-language pathology, or the continued need for occupational therapy. A plan of care has been established by a physician and is periodically reviewed by a physician.    I certify that this patient is under my care and that I, or a nurse practitioner or physician's assistant working with me, had a face-to-face encounter that meets the physician face-to-face encounter requirements with this patient.   Date of Face-to-Face Encounter: 8/22/2023    I certify that, based on my findings, the following services are medically necessary home health services: PT/OT    My  clinical findings support the need for the above skilled services because: PT/OT for ongoing strengthening and endurance    This patient is homebound because:  He requires maximum effort in order to get out into the community on a regular basis.     The patient is, or has been, under my care and I have initiated the establishment of the plan of care. This patient will be followed by a physician who will periodically review the plan of care.    35 total minutes spent with patient and SW in coordinating his discharge plan of care.     Electronically signed by: Eleanor Vásquez NP

## 2023-08-22 NOTE — TELEPHONE ENCOUNTER
[facility] Geriatrics Triage Nurse INR     Provider: ALETHEA Leiva  Facility: [facility]   Facility Type:  TCU    Caller: Debby  Call Back Number: [phone]  Reason for call: INR  Diagnosis/Goal: A. Fib    Todays INR: 2.32  Last INR 8/16 2.92(2mg daily)    Heparin/Lovenox:  No  Currently on ABX?: No  Other interacting medication:  Amiodarone, Plavix  Missed or refused doses: No      Nurse is also reporting potassium results.        Verbal Order/Direction given by Provider: Give an additional potassium 40meq BID today and check potassium on 8/23/23.  Warfarin 2mg daily.  Next INR 8/29/23.      Provider Giving Order:  ALETHEA Leiva    Verbal Order given to: Debby Erickson RN

## 2023-08-23 NOTE — TELEPHONE ENCOUNTER
[facility] Geriatrics Lab Note     Provider: ALETHEA Leiva  Facility: [facility]  Facility Type:  TCU    Allergies   Allergen Reactions    Carvedilol Other (See Comments)     fatigue    Dorzolamide Other (See Comments)     Other reaction(s): Unknown/Not Verified  Red irritated eye  Red irritated eye      Metoprolol Other (See Comments)     Other reaction(s): Hypotension       Labs Reviewed by provider: potassium     Verbal Order/Direction given by Provider: Take potassium 40meq daily.      Provider giving Order:  ALETHEA Leiva    Verbal Order given to: Brittney([phone])    Madhu Erickson RN

## 2023-08-24 NOTE — PROGRESS NOTES
Code Status:  FULL CODE  Visit Type: RECHECK     Facility:   [facility] ([facility]) [[id number]]        History of Present Illness:   Hospital Admission Date:8/8/2023      Hospital Discharge Date: 8/15/2023      Malcolm Barker is a 83 year old male with past medical history for CAD, ischemic cardiomyopathy, Htn, HLD, GERD, CKD3, neuropathy, HFrEF, s/p CRT-D placement and p. Afib.  He was recently hospitalized for acute on chronic HFrEF.  He was also found to have LLE cellulitis and was treated with IV Ancef and transitioned to Keflex at discharge.  He was seen by cardiology for heart failure and was treated with IV diuretics.  Metoprolol, Entresto Metolazone and sildenafil were all dc'd on recommendations from Cardiology.  He was recommended to undergo Barostim and so was seen by Vascular Surgery.  He is scheduled to have Barostime on 9/26.      Today, nursing reports a fall patient had out of bed last night that was not known to nursing.  Patient had his his left frontal face and had got himself back in bed.  Nursing noted extreme bruising and swelling when passing medications this morning.  Patient states he is a restless sleeper and fell out of bed.  He denies any headache, dizziness or lightheadness.  Neuro exam WnL and VSS.      Past Medical History:   Diagnosis Date    Contracture of palmar fascia     both hands, mild    Esophageal reflux     Essential hypertension, benign     Generalized osteoarthrosis, unspecified site     L shoulder, r hip..  improved now with exercise, glucosamine    Glaucoma     Hernia, abdominal     Hypertrophy (benign) of prostate     mild slowing    Infection due to 2019 novel coronavirus 1/28/2023     Past Surgical History:   Procedure Laterality Date    [facility]    [facility]COPY  4/24/2013    Procedure: COLONOSCOPY;  Colonoscopy       ENT SURGERY      T&A as a child    EYE SURGERY      lright eye lens replacement    HERNIA REPAIR       IMPLANT AUTOMATIC IMPLANTABLE CARDIOVERTER DEFIBRILLATOR  2014    [facility] NONSPECIFIC PROCEDURE      colonoscopy     [facility] NONSPECIFIC PROCEDURE      R hernia     Family History   Problem Relation Age of Onset    Cardiovascular Mother          /mi    Breast Cancer Sister     Thyroid Disease Maternal Aunt     Family History Negative Father          from accidental death     Social History     Socioeconomic History    Marital status:      Spouse name: Errol    Number of children: 2    Years of education: Not on file    Highest education level: Not on file   Occupational History    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 Not Asked     Service Not Asked    Blood Transfusions Not Asked    Caffeine Concern No    Occupational Exposure No    Hobby Hazards No    Sleep Concern No    Stress Concern No    Weight Concern No    Special Diet No    Back Care No    Exercise Yes     Comment: 4 x week    Bike Helmet Not Asked    Seat Belt Yes    Self-Exams Not Asked   Social History Narrative    Not on file     Social Determinants of Health     Financial Resource Strain: Low Risk  (2023)    Overall Financial Resource Strain (CARDIA)     Difficulty of Paying Living Expenses: Not very hard   Food Insecurity: No Food Insecurity (2023)    Hunger Vital Sign     Worried About Running Out of Food in the Last Year: Never true     Ran Out of Food in the Last Year: Never true   Transportation Needs: No Transportation Needs (2023)    PRAPARE - Transportation     Lack of  Transportation (Medical): No     Lack of Transportation (Non-Medical): No   Physical Activity: Sufficiently Active (2/27/2023)    Exercise Vital Sign     Days of Exercise per Week: 3 days     Minutes of Exercise per Session: 50 min   Stress: No Stress Concern Present (2/27/2023)    [employer] [place] [employer] - Occupational Stress Questionnaire     Feeling of Stress : Not at all   Social Connections: Unknown (2/27/2023)    Social Connection and Isolation Panel [NHANES]     Frequency of Communication with Friends and Family: More than three times a week     Frequency of Social Gatherings with Friends and Family: Not on file     Attends [employer] Services: Patient refused     Active Member of Clubs or Organizations: No     Attends Club or Organization Meetings: Not on file     Marital Status:    Intimate Partner Violence: Not on file   Housing Stability: Low Risk  (2/27/2023)    Housing Stability Vital Sign     Unable to Pay for Housing in the Last Year: No     Number of Places Lived in the Last Year: 1     Unstable Housing in the Last Year: No       Current Outpatient Medications   Medication Sig Dispense Refill    acetaminophen (TYLENOL) 500 MG tablet Take 1-2 tablets (500-1,000 mg) by mouth every 4 hours as needed for mild pain 30 tablet 0    albuterol (PROAIR HFA/PROVENTIL HFA/VENTOLIN HFA) 108 (90 Base) MCG/ACT inhaler Inhale 1-2 puffs into the lungs      amiodarone (PACERONE) 200 MG tablet Take 200 mg by mouth daily      Ascorbic Acid (VITAMIN C) 500 MG CAPS Take 1,000 mg by mouth daily      cholecalciferol (VITAMIN D3) 25 mcg (1000 units) capsule Take 1 capsule by mouth every 24 hours      clopidogrel (PLAVIX) 75 MG tablet Take 1 tablet by mouth daily      cycloSPORINE (RESTASIS) 0.05 % ophthalmic emulsion Place 1 drop into the right eye 2 times daily      DULoxetine (CYMBALTA) 30 MG capsule Take 1 capsule by mouth twice daily 180 capsule 3    Glucosamine-Chondroit-Vit C-Mn (GLUCOSAMINE  CHONDR 1500 COMPLX PO) Take by mouth daily      hydrALAZINE (APRESOLINE) 10 MG tablet Take 20 mg by mouth 3 times daily      latanoprost (XALATAN) 0.005 % ophthalmic solution       Multiple Vitamin (MULTIVITAMIN OR) Take  by mouth.      pantoprazole (PROTONIX) 40 MG EC tablet Take 40 mg by mouth daily      potassium chloride ER (KLOR-CON M) 20 MEQ CR tablet Take 40 mEq by mouth daily      rosuvastatin (CRESTOR) 10 MG tablet Take 10 mg by mouth daily      sodium chloride (OCEAN) 0.65 % nasal spray Spray 1 spray into both nostrils daily as needed for congestion      spironolactone (ALDACTONE) 25 MG tablet Take 12.5 mg by mouth daily      torsemide (DEMADEX) 100 MG tablet Take 100 mg by mouth daily      WARFARIN SODIUM PO 8/22/23 INR 2.32  Cont 2mg daily.  Next INR 8/29/23.    8/21/23 INR missed.  Take 2mg on 8/21.  Next INR 8/22/23.       Allergies   Allergen Reactions    Carvedilol Other (See Comments)     fatigue    Dorzolamide Other (See Comments)     Other reaction(s): Unknown/Not Verified  Red irritated eye  Red irritated eye      Metoprolol Other (See Comments)     Other reaction(s): Hypotension     Immunization History   Administered Date(s) Administered    COVID-19 Bivalent 12+ (Pfizer) 09/15/2022, 06/15/2023    COVID-19 Bivalent 18+ (Moderna) 09/15/2022    COVID-19 MONOVALENT 12+ (Pfizer) 01/30/2021, 02/20/2021, 09/07/2021    COVID-19 Monovalent 12+ (Pfizer 2022) 04/02/2022    HEPA 05/12/1997, 01/17/2007, 08/29/2007    HepB 05/12/1997, 01/17/2007, 08/29/2007    HepB, Unspecified 05/12/1997, 01/17/2007, 08/29/2007    Influenza (H1N1) 01/08/2010    Influenza (High Dose) 3 valent vaccine 10/05/2012, 09/13/2013, 10/15/2013, 10/03/2014, 10/15/2014, 09/22/2016, 10/15/2017, 09/20/2018, 09/25/2019, 09/03/2020    Influenza (IIV3) PF 11/12/2002, 11/13/2003, 12/01/2004, 10/25/2005, 12/18/2006, 10/30/2007, 10/23/2008, 10/10/2011    Influenza Vaccine 65+ (Fluzone HD) 10/15/2014, 09/20/2018, 09/25/2019, 09/03/2020,  "09/07/2021, 09/15/2022    Influenza Vaccine >6 months (Alfuria,Fluzone) 10/19/2015    Influenza, Whole Virus 09/15/2010    Pneumo Conj 13-V (2010&after) 04/06/2015    Pneumococcal 23 valent 08/30/2000, 12/18/2006    Poliovirus, inactivated (IPV) 05/12/1997    TD,PF 7+ (Tenivac) 01/13/1995, 03/14/2006, 07/24/2021    TDAP Vaccine (Adacel) 03/18/2014    Typhoid IM 01/17/2007, 12/09/2009, 01/26/2012    Yellow Fever 12/09/2009    Zoster recombinant adjuvanted (SHINGRIX) 04/09/2019, 08/13/2019    Zoster vaccine, live 03/20/2013       Review of Systems   Patient denies fever, chills, headache, lightheadedness, dizziness, rhinorrhea, cough, congestion, shortness of breath, chest pain, palpitations, abdominal pain, n/v, diarrhea, constipation, change in appetite, dysuria, frequency, burning or pain with urination.  Other than stated in HPI all other review of systems is negative.       Physical Exam  Vital signs:/68   Pulse 76   Temp 97.9  F (36.6  C)   Resp 18   Ht 1.803 m (5' 11\")   Wt 79.4 kg (175 lb)   SpO2 93%   BMI 24.41 kg/m     GENERAL APPEARANCE: Well developed, well nourished, in no acute distress.  HEENT: left eye and eyebrow with significant edema and echhymosis.  No open area or bleeding.   sclerae anicteric, conjunctivae clear and moist, EOM intact  LUNGS: Lung sounds CTA, no adventitious sounds, respiratory effort normal.  CARD: RRR, S1, S2, without murmurs, gallops, rubs  NEURO: Alert and oriented x 3. Face is symmetric.  SKIN: see HEENT  PSYCH: euthymic        Labs:    Last Comprehensive Metabolic Panel:  Lab Results   Component Value Date     08/21/2023    POTASSIUM 3.4 08/23/2023    CHLORIDE 93 (L) 08/21/2023    CO2 26 08/21/2023    ANIONGAP 17 (H) 08/21/2023     (H) 08/21/2023    BUN 36.8 (H) 08/21/2023    CR 1.58 (H) 08/21/2023    GFRESTIMATED 43 (L) 08/21/2023    NOHEMY 9.5 08/21/2023     Lab Results   Component Value Date    WBC 7.0 08/21/2023    WBC 5.8 04/13/2021     Lab " "Results   Component Value Date    RBC 4.05 08/21/2023    RBC 4.21 04/13/2021     Lab Results   Component Value Date    HGB 12.8 08/21/2023    HGB 13.9 04/13/2021     Lab Results   Component Value Date    HCT 41.0 08/21/2023    HCT 41.9 04/13/2021     Lab Results   Component Value Date     08/21/2023     04/13/2021     Lab Results   Component Value Date    MCH 31.6 08/21/2023    MCH 33.0 04/13/2021     Lab Results   Component Value Date    MCHC 31.2 08/21/2023    MCHC 33.2 04/13/2021     Lab Results   Component Value Date    RDW 14.8 08/21/2023    RDW 12.6 04/13/2021     Lab Results   Component Value Date     08/21/2023     04/13/2021     INR   Date Value Ref Range Status   08/22/2023 2.32 (H) 0.85 - 1.15 Final   07/08/2021 3.3 (A) 0.90 - 1.10 Final     INR (External)   Date Value Ref Range Status   07/19/2023 2.1 (A) 0.9 - 1.1 Final   07/22/2021 2 2 - 3 Final     Comment:     Result was 2.4          Assessment/plan:   Facial trauma, initial encounter  Fall, initial encounter  Anticoagulated on Coumadin  Advised patient on risk for intracranial bleed.  He declines being sent to the ER for CT of head.  He is discharging to home tomorrow and does not feel this fall is \"as bad as my last one\" and \"I went to the ER then and it was nothing\".  Counseled on symptoms of intracranial trauma and advised if he would have these symptoms to be sure to go to the ER.  He agreed.             Electronically signed by: Eleanor Vásquez NP    "

## 2023-08-24 NOTE — LETTER
8/24/2023        RE: Malcolm Barker  [address] [place] [address]        Code Status:  FULL CODE  Visit Type: RECHECK     Facility:   [facility] ([facility]) [[id number]]        History of Present Illness:   Hospital Admission Date:8/8/2023      Hospital Discharge Date: 8/15/2023      Malcolm Barker is a 83 year old male with past medical history for CAD, ischemic cardiomyopathy, Htn, HLD, GERD, CKD3, neuropathy, HFrEF, s/p CRT-D placement and p. Afib.  He was recently hospitalized for acute on chronic HFrEF.  He was also found to have LLE cellulitis and was treated with IV Ancef and transitioned to Keflex at discharge.  He was seen by cardiology for heart failure and was treated with IV diuretics.  Metoprolol, Entresto Metolazone and sildenafil were all dc'd on recommendations from Cardiology.  He was recommended to undergo Barostim and so was seen by Vascular Surgery.  He is scheduled to have Barostime on 9/26.      Today, nursing reports a fall patient had out of bed last night that was not known to nursing.  Patient had his his left frontal face and had got himself back in bed.  Nursing noted extreme bruising and swelling when passing medications this morning.  Patient states he is a restless sleeper and fell out of bed.  He denies any headache, dizziness or lightheadness.  Neuro exam WnL and VSS.      Past Medical History:   Diagnosis Date     Contracture of palmar fascia     both hands, mild     Esophageal reflux      Essential hypertension, benign      Generalized osteoarthrosis, unspecified site     L shoulder, r hip..  improved now with exercise, glucosamine     Glaucoma      Hernia, abdominal      Hypertrophy (benign) of prostate     mild slowing     Infection due to 2019 novel coronavirus 1/28/2023     Past Surgical History:   Procedure Laterality Date     COLONOSCOPY  4/24/2013    [facility]     COLONOSCOPY  4/24/2013    Procedure: COLONOSCOPY;  Colonoscopy         ENT SURGERY      T&A as a child     EYE SURGERY      lright eye lens replacement     HERNIA REPAIR       IMPLANT AUTOMATIC IMPLANTABLE CARDIOVERTER DEFIBRILLATOR  2014     ZZ NONSPECIFIC PROCEDURE      colonoscopy      Z NONSPECIFIC PROCEDURE      R hernia     Family History   Problem Relation Age of Onset     Cardiovascular Mother          /mi     Breast Cancer Sister      Thyroid Disease Maternal Aunt      Family History Negative Father          from accidental death     Social History     Socioeconomic History     Marital status:      Spouse name: Errol     Number of children: 2     Years of education: Not on file     Highest education level: Not on file   Occupational History     Occupation: 3M     Comment: retired    Tobacco Use     Smoking status: Former     Years: 3.00     Types: Cigarettes     Quit date: 1999     Years since quittin.4     Smokeless tobacco: Never     Tobacco comments:     quit approx    Substance and Sexual Activity     Alcohol use: Yes     Comment: 2 drinks daily     Drug use: No     Sexual activity: Yes     Partners: Female   Other Topics Concern     Parent/sibling w/ CABG, MI or angioplasty before 65F 55M? Not Asked      Service Not Asked     Blood Transfusions Not Asked     Caffeine Concern No     Occupational Exposure No     Hobby Hazards No     Sleep Concern No     Stress Concern No     Weight Concern No     Special Diet No     Back Care No     Exercise Yes     Comment: 4 x week     Bike Helmet Not Asked     Seat Belt Yes     Self-Exams Not Asked   Social History Narrative     Not on file     Social Determinants of Health     Financial Resource Strain: Low Risk  (2023)    Overall Financial Resource Strain (CARDIA)      Difficulty of Paying Living Expenses: Not very hard   Food Insecurity: No Food Insecurity (2023)    Hunger Vital Sign      Worried About Running Out of Food in the Last Year: Never true      Ran Out  of Food in the Last Year: Never true   Transportation Needs: No Transportation Needs (2/27/2023)    PRAPARE - Transportation      Lack of Transportation (Medical): No      Lack of Transportation (Non-Medical): No   Physical Activity: Sufficiently Active (2/27/2023)    Exercise Vital Sign      Days of Exercise per Week: 3 days      Minutes of Exercise per Session: 50 min   Stress: No Stress Concern Present (2/27/2023)    [employer] [place] [employer] - Occupational Stress Questionnaire      Feeling of Stress : Not at all   Social Connections: Unknown (2/27/2023)    Social Connection and Isolation Panel [NHANES]      Frequency of Communication with Friends and Family: More than three times a week      Frequency of Social Gatherings with Friends and Family: Not on file      Attends Evangelical Services: Patient refused      Active Member of Clubs or Organizations: No      Attends Club or Organization Meetings: Not on file      Marital Status:    Intimate Partner Violence: Not on file   Housing Stability: Low Risk  (2/27/2023)    Housing Stability Vital Sign      Unable to Pay for Housing in the Last Year: No      Number of Places Lived in the Last Year: 1      Unstable Housing in the Last Year: No       Current Outpatient Medications   Medication Sig Dispense Refill     acetaminophen (TYLENOL) 500 MG tablet Take 1-2 tablets (500-1,000 mg) by mouth every 4 hours as needed for mild pain 30 tablet 0     albuterol (PROAIR HFA/PROVENTIL HFA/VENTOLIN HFA) 108 (90 Base) MCG/ACT inhaler Inhale 1-2 puffs into the lungs       amiodarone (PACERONE) 200 MG tablet Take 200 mg by mouth daily       Ascorbic Acid (VITAMIN C) 500 MG CAPS Take 1,000 mg by mouth daily       cholecalciferol (VITAMIN D3) 25 mcg (1000 units) capsule Take 1 capsule by mouth every 24 hours       clopidogrel (PLAVIX) 75 MG tablet Take 1 tablet by mouth daily       cycloSPORINE (RESTASIS) 0.05 % ophthalmic emulsion Place 1 drop into the right  eye 2 times daily       DULoxetine (CYMBALTA) 30 MG capsule Take 1 capsule by mouth twice daily 180 capsule 3     Glucosamine-Chondroit-Vit C-Mn (GLUCOSAMINE CHONDR 1500 COMPLX PO) Take by mouth daily       hydrALAZINE (APRESOLINE) 10 MG tablet Take 20 mg by mouth 3 times daily       latanoprost (XALATAN) 0.005 % ophthalmic solution        Multiple Vitamin (MULTIVITAMIN OR) Take  by mouth.       pantoprazole (PROTONIX) 40 MG EC tablet Take 40 mg by mouth daily       potassium chloride ER (KLOR-CON M) 20 MEQ CR tablet Take 40 mEq by mouth daily       rosuvastatin (CRESTOR) 10 MG tablet Take 10 mg by mouth daily       sodium chloride (OCEAN) 0.65 % nasal spray Spray 1 spray into both nostrils daily as needed for congestion       spironolactone (ALDACTONE) 25 MG tablet Take 12.5 mg by mouth daily       torsemide (DEMADEX) 100 MG tablet Take 100 mg by mouth daily       WARFARIN SODIUM PO 8/22/23 INR 2.32  Cont 2mg daily.  Next INR 8/29/23.    8/21/23 INR missed.  Take 2mg on 8/21.  Next INR 8/22/23.       Allergies   Allergen Reactions     Carvedilol Other (See Comments)     fatigue     Dorzolamide Other (See Comments)     Other reaction(s): Unknown/Not Verified  Red irritated eye  Red irritated eye       Metoprolol Other (See Comments)     Other reaction(s): Hypotension     Immunization History   Administered Date(s) Administered     COVID-19 Bivalent 12+ (Pfizer) 09/15/2022, 06/15/2023     COVID-19 Bivalent 18+ (Moderna) 09/15/2022     COVID-19 MONOVALENT 12+ (Pfizer) 01/30/2021, 02/20/2021, 09/07/2021     COVID-19 Monovalent 12+ (Pfizer 2022) 04/02/2022     HEPA 05/12/1997, 01/17/2007, 08/29/2007     HepB 05/12/1997, 01/17/2007, 08/29/2007     HepB, Unspecified 05/12/1997, 01/17/2007, 08/29/2007     Influenza (H1N1) 01/08/2010     Influenza (High Dose) 3 valent vaccine 10/05/2012, 09/13/2013, 10/15/2013, 10/03/2014, 10/15/2014, 09/22/2016, 10/15/2017, 09/20/2018, 09/25/2019, 09/03/2020     Influenza (IIV3) PF  "11/12/2002, 11/13/2003, 12/01/2004, 10/25/2005, 12/18/2006, 10/30/2007, 10/23/2008, 10/10/2011     Influenza Vaccine 65+ (Fluzone HD) 10/15/2014, 09/20/2018, 09/25/2019, 09/03/2020, 09/07/2021, 09/15/2022     Influenza Vaccine >6 months (Alfuria,Fluzone) 10/19/2015     Influenza, Whole Virus 09/15/2010     Pneumo Conj 13-V (2010&after) 04/06/2015     Pneumococcal 23 valent 08/30/2000, 12/18/2006     Poliovirus, inactivated (IPV) 05/12/1997     TD,PF 7+ (Tenivac) 01/13/1995, 03/14/2006, 07/24/2021     TDAP Vaccine (Adacel) 03/18/2014     Typhoid IM 01/17/2007, 12/09/2009, 01/26/2012     Yellow Fever 12/09/2009     Zoster recombinant adjuvanted (SHINGRIX) 04/09/2019, 08/13/2019     Zoster vaccine, live 03/20/2013       Review of Systems   Patient denies fever, chills, headache, lightheadedness, dizziness, rhinorrhea, cough, congestion, shortness of breath, chest pain, palpitations, abdominal pain, n/v, diarrhea, constipation, change in appetite, dysuria, frequency, burning or pain with urination.  Other than stated in HPI all other review of systems is negative.       Physical Exam  Vital signs:/68   Pulse 76   Temp 97.9  F (36.6  C)   Resp 18   Ht 1.803 m (5' 11\")   Wt 79.4 kg (175 lb)   SpO2 93%   BMI 24.41 kg/m     GENERAL APPEARANCE: Well developed, well nourished, in no acute distress.  HEENT: left eye and eyebrow with significant edema and echhymosis.  No open area or bleeding.   sclerae anicteric, conjunctivae clear and moist, EOM intact  LUNGS: Lung sounds CTA, no adventitious sounds, respiratory effort normal.  CARD: RRR, S1, S2, without murmurs, gallops, rubs  NEURO: Alert and oriented x 3. Face is symmetric.  SKIN: see HEENT  PSYCH: euthymic        Labs:    Last Comprehensive Metabolic Panel:  Lab Results   Component Value Date     08/21/2023    POTASSIUM 3.4 08/23/2023    CHLORIDE 93 (L) 08/21/2023    CO2 26 08/21/2023    ANIONGAP 17 (H) 08/21/2023     (H) 08/21/2023    BUN 36.8 " "(H) 08/21/2023    CR 1.58 (H) 08/21/2023    GFRESTIMATED 43 (L) 08/21/2023    NOHEMY 9.5 08/21/2023     Lab Results   Component Value Date    WBC 7.0 08/21/2023    WBC 5.8 04/13/2021     Lab Results   Component Value Date    RBC 4.05 08/21/2023    RBC 4.21 04/13/2021     Lab Results   Component Value Date    HGB 12.8 08/21/2023    HGB 13.9 04/13/2021     Lab Results   Component Value Date    HCT 41.0 08/21/2023    HCT 41.9 04/13/2021     Lab Results   Component Value Date     08/21/2023     04/13/2021     Lab Results   Component Value Date    MCH 31.6 08/21/2023    MCH 33.0 04/13/2021     Lab Results   Component Value Date    MCHC 31.2 08/21/2023    MCHC 33.2 04/13/2021     Lab Results   Component Value Date    RDW 14.8 08/21/2023    RDW 12.6 04/13/2021     Lab Results   Component Value Date     08/21/2023     04/13/2021     INR   Date Value Ref Range Status   08/22/2023 2.32 (H) 0.85 - 1.15 Final   07/08/2021 3.3 (A) 0.90 - 1.10 Final     INR (External)   Date Value Ref Range Status   07/19/2023 2.1 (A) 0.9 - 1.1 Final   07/22/2021 2 2 - 3 Final     Comment:     Result was 2.4          Assessment/plan:   Facial trauma, initial encounter  Fall, initial encounter  Anticoagulated on Coumadin  Advised patient on risk for intracranial bleed.  He declines being sent to the ER for CT of head.  He is discharging to home tomorrow and does not feel this fall is \"as bad as my last one\" and \"I went to the ER then and it was nothing\".  Counseled on symptoms of intracranial trauma and advised if he would have these symptoms to be sure to go to the ER.  He agreed.             Electronically signed by: Eleanor Vásquez NP      Sincerely,        Eleanor Vásquez NP      "

## 2023-08-28 NOTE — TELEPHONE ENCOUNTER
ANTICOAGULATION  MANAGEMENT: Discharge Review    [facility] chart reviewed for anticoagulation continuity of care    TCU  on 8/25/23 for rehab after hospitalization for acute on chronic CHF, and LLE cellulitis.    Discharge disposition: Home    Results:    Recent labs: (last 7 days)     08/22/23  0843   INR 2.32*     Anticoagulation inpatient management:     less warfarin administered than maintenance regimen    Anticoagulation discharge instructions:     Warfarin dosing: decrease dose to 2 mg daily (was taking 3 mg one day a week, 2 mg all other days, the last time we managed him , 7/19/23   Bridging: No   INR goal change: No      Medication changes affecting anticoagulation: Amiodarone started 6/29/23 and titrated up    Additional factors affecting anticoagulation: No     PLAN     No adjustment to anticoagulation plan needed  Agree with dosing adjustment on discharge  Agree with discharge plan for follow up on 8/29/23, though patient is not able to come into lab until 8/31/23     Spoke with Jaskaran    Anticoagulation Calendar updated    Katherin Carreno RN

## 2023-08-28 NOTE — TELEPHONE ENCOUNTER
Routing encounter to Anticoagulation Pharmacist for warfarin hold assessment. In patient's TCU discharge note, 8/24/23, it is noted he is scheduled to have Barostim on 9/26/23. Unsure how invasive this procedure is, contacted Demi Preciado [facility], who advised to route encounter for further investigation.    Katherin JEROME RN  Anticoagulation Team

## 2023-08-28 NOTE — TELEPHONE ENCOUNTER
Patient Returning Call    Reason for call:  INR     Information relayed to patient:  RN to return call    Patient has additional questions:  Yes        Could we send this information to you in [employer] or would you prefer to receive a phone call?:   Patient would prefer a phone call   Okay to leave a detailed message?: Yes at Cell number on file:    Telephone Information:   Mobile [phone]     Piper Bingham

## 2023-08-31 NOTE — PROGRESS NOTES
ANTICOAGULATION MANAGEMENT     [facility] 83 [facility] is on warfarin with subtherapeutic INR result. (Goal INR 2.0-3.0)    Recent labs: (last 7 days)     08/31/23  1054   INR 1.9*       ASSESSMENT     Source(s): Chart Review and Patient/Caregiver Call     Warfarin doses taken: Warfarin taken as instructed  Diet: No new diet changes identified  Medication/supplement changes: None noted  New illness, injury, or hospitalization: No  Signs or symptoms of bleeding or clotting: No  Previous result: Therapeutic last 2(+) visits  Additional findings:  Recent discharge from TCU.  Patient has upcoming barostim procedure 9/6/23, note sent to PharmD to review on warfarin interruption. Patient thinks he was told he needs 4 day warfarin hold.        PLAN     Recommended plan for temporary change(s) affecting INR     Dosing Instructions: Continue your current warfarin dose with next INR in 2 weeks       Summary  As of 8/31/2023      Full warfarin instructions:  2 mg every day   Next INR check:  9/13/2023               Telephone call with Jaskaran who verbalizes understanding and agrees to plan    Lab visit scheduled    Education provided:   Please call back if any changes to your diet, medications or how you've been taking warfarin    Plan made per [facility] anticoagulation protocol    Jeanette Eubanks RN  Anticoagulation Clinic  8/31/2023    _______________________________________________________________________     Anticoagulation Episode Summary       Current INR goal:  2.0-3.0   TTR:  42.8 % (1 y)   Target end date:  Indefinite   Send INR reminders to:  ANTICOAG [facility]    Indications    Long term current use of anticoagulant therapy [Z79.01]  Chronic atrial fibrillation (H) [I48.20]             Comments:               Anticoagulation Care Providers       Provider Role Specialty Phone number    Tod Callahan MD Referring Internal Medicine - Pediatrics [phone]

## 2023-09-04 NOTE — PATIENT INSTRUCTIONS
It appears you have recurrence of your left lower leg cellulitis infection with redness and swelling.     Blood count checked especially to screen white count to see level of an infection.  If significantly elevated, you would have been directed to the emergency room.  Your white count is normal.  Slight Anemia noted -this is stable    Start oral antibiotic Keflex 500 mg 3 times a day for 10 days.    To prevent diarrhea and to keep good bacteria in your bowel, I would like you to take probiotic twice a day in between antibiotic doses.    Area outlined with marker.    If redness spreads, swelling worsens or you develop fever, you will need to return to the hospital    INR checked is 1.8  Trending down as expected as coumadin held    This current infection may interfere with your procedure this week.  Please call the medical office tomorrow to discuss.    It appears your heart failure is stable by exam.    I would like you to recheck with your primary provider in 1 week from today's visit    Chart CCed to your primary.      Component      Latest Ref Rng 9/4/2023  9:54 AM   WBC      4.0 - 11.0 10e3/uL 8.4    RBC Count      4.40 - 5.90 10e6/uL 3.88 (L)    Hemoglobin      13.3 - 17.7 g/dL 12.5 (L)    Hematocrit      40.0 - 53.0 % 39.0 (L)    MCV      78 - 100 fL 101 (H)    MCH      26.5 - 33.0 pg 32.2    MCHC      31.5 - 36.5 g/dL 32.1    RDW      10.0 - 15.0 % 16.1 (H)    Platelet Count      150 - 450 10e3/uL 245    % Neutrophils      % 83    % Lymphocytes      % 10    % Monocytes      % 7    % Eosinophils      % 1    % Basophils      % 0    % Immature Granulocytes      % 0    Absolute Neutrophils      1.6 - 8.3 10e3/uL 7.0    Absolute Lymphocytes      0.8 - 5.3 10e3/uL 0.8    Absolute Monocytes      0.0 - 1.3 10e3/uL 0.6    Absolute Eosinophils      0.0 - 0.7 10e3/uL 0.0    Absolute Basophils      0.0 - 0.2 10e3/uL 0.0    Absolute Immature Granulocytes      <=0.4 10e3/uL 0.0    INR Point of Care      0.9 - 1.1  1.8  (H)       Legend:  (L) Low  (H) High

## 2023-09-04 NOTE — TELEPHONE ENCOUNTER
"Caller is pt's wife (Sugey).  On Consent to Communicate.  Also currently with pt.    Lower L leg was treated for cellulitis at TCU one month ago.  Was given IV Ancef, then transitioned to oral Keflex.    Currently, \"L foot looks swollen (onset last night).\"  Uncertain when swelling started to recur.  Wife states she \"hasn't been regularly looking at it.\"    Pt told wife \"leg bothered him last night.\"  \"Swelling now goes up to shin.\"  New redness also of foot and shin.  \"Tender to the touch.\"    Advised prompt same-day  eval.  Reviewed which  clinics are open today (Labor Day).  Wife intends to drive pt to [facility].  Hours confirmed.    Jesi ANDRADE [facility] Nurse Advisor     Reason for Disposition   [1] Looks infected AND [2] large red area (> 2 in. or 5 cm)    Protocols used: Skin Lesion - Moles or Growths-A-AH    "

## 2023-09-04 NOTE — PROGRESS NOTES
ASSESSMENT AND PLAN:      ICD-10-CM    1. Left leg cellulitis  L03.116 CBC with platelets and differential     cephALEXin (KEFLEX) 500 MG capsule     CBC with platelets and differential      2. Long term current use of anticoagulant therapy  Z79.01 INR     CANCELED: INR      3. Chronic systolic congestive heart failure (H)  I50.22       4. Chronic atrial fibrillation (H)  I48.20 INR point of care        Exam consistent with recurrent cellulitis with redness and warmth in left lower extremity.  Swelling most likely associated with this.  White count normal.    Recent CHF episode.  Appears to be stable.  He does not have fluid in the right leg and his lungs are without rales    Also in the differential would include a DVT.  He is on chronic coagulation with warfarin.  INR 1.8.  This has been held for procedure coming up.  Also on Plavix  Due to swelling and redness starting together.  Feel most likely cellulitis without including DVT.    If cellulitis or swelling worsening they are directed to the ER for further evaluation and treatment which could include ultrasound.      Serious Comorbid Conditions: afib/anticoagulation, coronary artery disease, GI bleed, acute renal failure.  Multiple comorbidities        PLAN:      Patient Instructions       It appears you have recurrence of your left lower leg cellulitis infection with redness and swelling.     Blood count checked especially to screen white count to see level of an infection.  If significantly elevated, you would have been directed to the emergency room.  Your white count is normal.  Slight Anemia noted -this is stable    Start oral antibiotic Keflex 500 mg 3 times a day for 10 days.    To prevent diarrhea and to keep good bacteria in your bowel, I would like you to take probiotic twice a day in between antibiotic doses.    Area outlined with marker.    If redness spreads, swelling worsens or you develop fever, you will need to return to the hospital    INR  checked is 1.8  Trending down as expected as coumadin held    This current infection may interfere with your procedure this week.  Please call the medical office tomorrow to discuss.    It appears your heart failure is stable by exam.    I would like you to recheck with your primary provider in 1 week from today's visit    Chart CCed to your primary.      Component      Latest Ref Rng 9/4/2023  9:54 AM   WBC      4.0 - 11.0 10e3/uL 8.4    RBC Count      4.40 - 5.90 10e6/uL 3.88 (L)    Hemoglobin      13.3 - 17.7 g/dL 12.5 (L)    Hematocrit      40.0 - 53.0 % 39.0 (L)    MCV      78 - 100 fL 101 (H)    MCH      26.5 - 33.0 pg 32.2    MCHC      31.5 - 36.5 g/dL 32.1    RDW      10.0 - 15.0 % 16.1 (H)    Platelet Count      150 - 450 10e3/uL 245    % Neutrophils      % 83    % Lymphocytes      % 10    % Monocytes      % 7    % Eosinophils      % 1    % Basophils      % 0    % Immature Granulocytes      % 0    Absolute Neutrophils      1.6 - 8.3 10e3/uL 7.0    Absolute Lymphocytes      0.8 - 5.3 10e3/uL 0.8    Absolute Monocytes      0.0 - 1.3 10e3/uL 0.6    Absolute Eosinophils      0.0 - 0.7 10e3/uL 0.0    Absolute Basophils      0.0 - 0.2 10e3/uL 0.0    Absolute Immature Granulocytes      <=0.4 10e3/uL 0.0    INR Point of Care      0.9 - 1.1  1.8 (H)       Legend:  (L) Low  (H) High  Return in about 1 week (around 9/11/2023) for cellulitis.      Bernarda Moss MD  [facility]    Subjective     Malcolm Barker is a 83 year old who presents for Patient presents with:  Urgent Care  Leg Swelling: One month ago pt had cellulitis on the same leg and yesterday pt states there was some burning sensation on his left leg and swelling. Today is it painful to walk    an established patient of [facility].      Hospitalized [facility] for 6 days for cellulitis.  8/8/2023  left leg cellulitis with CHF and diuresis for 30 #  Was in Rehab for 9/25.  At home for 1 week.      Wife noticed left lower leg/ankle  swelling with redness last night.  New redness noted lower front leg    Warmth noted. Burning in area of redness  No pain    Has procedure in 2 days.  Off Coumadin 2 days      Review of Systems   Constitutional:  Negative for chills, diaphoresis and fever.   Respiratory:          Breathing stable, not good since home     Cardiovascular:  Negative for chest pain and palpitations.   Neurological:  Negative for dizziness, syncope and headaches.           Objective    /74   Pulse 88   Temp 97.2  F (36.2  C) (Tympanic)   SpO2 97%   Physical Exam  Vitals reviewed.   Constitutional:       Appearance: Normal appearance.      Comments: Weak  Wheeled walker   Cardiovascular:      Rate and Rhythm: Normal rate. Rhythm irregular.      Pulses: Normal pulses.      Heart sounds: Normal heart sounds.   Pulmonary:      Effort: Pulmonary effort is normal. No respiratory distress.      Breath sounds: Normal breath sounds. No wheezing, rhonchi or rales.   Musculoskeletal:      Right lower leg: No edema.      Left lower leg: Edema (mid way up leg.  90m42ye red warm area lower anterior tibia) present.   Neurological:      Mental Status: He is alert.            Results for orders placed or performed in visit on 09/04/23 (from the past 24 hour(s))   CBC with platelets and differential    Narrative    The following orders were created for panel order CBC with platelets and differential.  Procedure                               Abnormality         Status                     ---------                               -----------         ------                     CBC with platelets and d...[[id number]]  Abnormal            Final result                 Please view results for these tests on the individual orders.   CBC with platelets and differential   Result Value Ref Range    WBC Count 8.4 4.0 - 11.0 10e3/uL    RBC Count 3.88 (L) 4.40 - 5.90 10e6/uL    Hemoglobin 12.5 (L) 13.3 - 17.7 g/dL    Hematocrit 39.0 (L) 40.0 - 53.0 %      (H) 78 - 100 fL    MCH 32.2 26.5 - 33.0 pg    MCHC 32.1 31.5 - 36.5 g/dL    RDW 16.1 (H) 10.0 - 15.0 %    Platelet Count 245 150 - 450 10e3/uL    % Neutrophils 83 %    % Lymphocytes 10 %    % Monocytes 7 %    % Eosinophils 1 %    % Basophils 0 %    % Immature Granulocytes 0 %    Absolute Neutrophils 7.0 1.6 - 8.3 10e3/uL    Absolute Lymphocytes 0.8 0.8 - 5.3 10e3/uL    Absolute Monocytes 0.6 0.0 - 1.3 10e3/uL    Absolute Eosinophils 0.0 0.0 - 0.7 10e3/uL    Absolute Basophils 0.0 0.0 - 0.2 10e3/uL    Absolute Immature Granulocytes 0.0 <=0.4 10e3/uL   INR point of care   Result Value Ref Range    INR 1.8 (H) 0.9 - 1.1    Narrative    This test is intended for monitoring Coumadin therapy. Results are not accurate in patients with prolonged INR due to factor deficiency.

## 2023-09-05 NOTE — PROGRESS NOTES
ANTICOAGULATION MANAGEMENT     Malcolm HENDERSON [facility] 83 year old male is on warfarin with subtherapeutic INR result. (Goal INR 2.0-3.0)    Recent labs: (last 7 days)     09/04/23  0954   INR 1.8*       ASSESSMENT     Source(s): Chart Review and Patient/Caregiver Call     Warfarin doses taken: Held for 3 days in preparation for procedure on 9/6/23  recently which may be affecting INR  Diet: No new diet changes identified  Medication/supplement changes:  Keflex tid x10 days started on 9/4/23 not expected to affect INR, but may increase risk of bleeding - Recurring cellulitis of LLE  New illness, injury, or hospitalization: Yes: Recurring Cellulitis of LLE  Signs or symptoms of bleeding or clotting: No  Previous result: Subtherapeutic  Additional findings:  Barostim procedure on 9/6/23 cancelled due to Cellulitis. Not rescheduled yet.       PLAN     Recommended plan for temporary change(s) and ongoing change(s) affecting INR     Dosing Instructions: Resume warfarin by taking boost dose today with next INR in 1 week       Summary  As of 9/5/2023      Full warfarin instructions:  9/5: 3 mg; Otherwise 2 mg every day   Next INR check:  9/13/2023               Telephone call with Jaskaran who verbalizes understanding and agrees to plan  Sent Mention Mobile message with dosing and follow up instructions    Lab visit scheduled    Education provided:   Please call back if any changes to your diet, medications or how you've been taking warfarin  Goal range and lab monitoring: goal range and significance of current result and Importance of following up at instructed interval  Importance of notifying anticoagulation clinic for: changes in medications; a sooner lab recheck maybe needed and upcoming surgeries and procedures 2 weeks in advance  Written instructions provided  Contact [phone] with any changes, questions or concerns.     Plan made per [facility] anticoagulation protocol    Jewels Blas RN  Anticoagulation  Clinic  9/5/2023    _______________________________________________________________________     Anticoagulation Episode Summary       Current INR goal:  2.0-3.0   TTR:  42.9 % (1 y)   Target end date:  Indefinite   Send INR reminders to:  [facility]    Indications    Long term current use of anticoagulant therapy [Z79.01]  Chronic atrial fibrillation (H) [I48.20]             Comments:               Anticoagulation Care Providers       Provider Role Specialty Phone number    Tod Callahan MD Referring Internal Medicine - Pediatrics [phone]

## 2023-09-06 NOTE — PROGRESS NOTES
Assessment & Plan       ICD-10-CM    1. Cellulitis of left lower extremity  L03.116       2. Chronic systolic congestive heart failure (H)  I50.22       3. Daytime sleepiness  R40.0 Adult Sleep Eval & Management  Referral        Cellulitis LLE. Increased sx following discharge, now clinically is improving.  Recommend completing course of cephalexin.    CHF. No med changes made.    Sleepiness. Concern for RYAN. Sleep referral signed.    MED REC REQUIRED  Post Medication Reconciliation Status:  Discharge medications reconciled, continue medications without change      Tod Callahan MD  [facility] Jessica is a 83 year old, presenting for the following health issues:  Hospital F/U        9/6/2023     2:44 PM   Additional Questions   Roomed by S         9/6/2023     2:44 PM   Patient Reported Additional Medications   Patient reports taking the following new medications none       HPI       Hospital Follow-up Visit:    Hospital/Nursing Home/IP Rehab Facility:  [facility]  then [facility] (U) on 08/16/2023  Date of Admission: 08/08/2023  Date of Discharge: 08/15/2023  Reason(s) for Admission: acute CHF, then admitted to [facility] for strengthening and rehab.     TCU: [facility]  Admission 8/15/23  Discharge 8/24/23    Was your hospitalization related to COVID-19? No   Problems taking medications regularly:  None  Medication changes since discharge: None  Problems adhering to non-medication therapy:  unsure     Summary of hospitalization:   discharge summary reviewed, U stay summary also reviewed  Diagnostic Tests/Treatments reviewed.  Follow up needed: No labwork needed today  Other Healthcare Providers Involved in Patient s Care:         Specialist appointment - Vascular surgery, scheduled  Update since discharge: improved.     Plan of care communicated with patient    He was treated for LLE cellulitis in his  hospital and TCU stay. Sx had improved, but started to return.  He was evaluated in UC 9/4/23, noted to have erythema and warmth of the left shin area. Restarted cephalexin d/t presumed return of cellulitis.   Since restarting cephalexin, his LE skin changes have improved. Was pink, now purple in color. No pain noted.    He has increased daytime sleepiness. Chronic CHF. There has been concern about possible sleep apnea. No prior evaluations done. Offered to sign a referral.           Objective    /72 (BP Location: Left arm, Patient Position: Sitting, Cuff Size: Adult Regular)   Pulse 81   Temp (!) 96.7  F (35.9  C) (Tympanic)   Resp 20   Wt 88.7 kg (195 lb 8 oz)   SpO2 98%   BMI 27.27 kg/m    Body mass index is 27.27 kg/m .  Physical Exam   GEN: No distress  SKIN: Purple discoloration on the left anterior shin, large area affected. No warmth. No discharge noted.  Changes of stasis dermatitis noted BLE.   LUNGS: Clear to auscultation bilaterally. No rhonchi, rales, wheezes or retractions.  CV: RRR. No M.  ABD: BS+. S, ND, NT.

## 2023-09-06 NOTE — TELEPHONE ENCOUNTER
TC:    Please call pt & help schedule a TCU f/up with  this week. Thanks.    Yudy RN  Patient Advocate Liason (PAL)  [facility]  Ph. [phone] / Fax. [phone]

## 2023-09-12 NOTE — TELEPHONE ENCOUNTER
"JONE-PROCEDURAL ANTICOAGULATION  MANAGEMENT    ASSESSMENT     Warfarin interruption plan for Barostim placement on 9/25/23.    Indication for Anticoagulation: Atrial Fibrillation    [id number] = 4 (CHF, Age >= 75, and Vascular- STEMI, PCI)      Jone-Procedure Risk stratification for thromboembolism: low (2022 Chest guidelines)    AFIB: 2022 CHEST Perioperative Management guidelines recommends against bridging for patients with atrial fibrillation except in high risk stratification patients.     RECOMMENDATION     Pre-Procedure:  Hold warfarin for 5 days, until after procedure starting: Wednesday, September 20   No Bridge    Post-Procedure:  Resume warfarin dose if okay with provider doing procedure on night of procedure, Monday, September 25 PM: take 4 mg x 2 days, then resume home dose  Recheck INR ~ 7 days after resuming warfarin     Plan routed to referring provider for approval  ?   Demi Preciado [facility]    SUBJECTIVE/OBJECTIVE     Malcolm Barker, a 83 year old male    Goal INR Range: 2.0-3.0     Patient bridged in past: Yes: bridged for 3 days prior to [place] in July 2022 at the [facility] cardiology. No post procedure bridging    Wt Readings from Last 3 Encounters:   09/06/23 88.7 kg (195 lb 8 oz)   08/24/23 79.4 kg (175 lb)   08/22/23 79.4 kg (175 lb)      Ideal body weight: 75.3 kg (166 lb 0.1 oz)  Adjusted ideal body weight: 80.7 kg (177 lb 12.9 oz)     Estimated body mass index is 27.27 kg/m  as calculated from the following:    Height as of 8/24/23: 1.803 m (5' 11\").    Weight as of 9/6/23: 88.7 kg (195 lb 8 oz).    Lab Results   Component Value Date    INR 1.8 (H) 09/04/2023    INR 1.9 (H) 08/31/2023    INR 2.32 (H) 08/22/2023     Lab Results   Component Value Date    HGB 12.5 (L) 09/04/2023    HCT 39.0 (L) 09/04/2023     09/04/2023     Lab Results   Component Value Date    CR 1.58 (H) 08/21/2023    CR 1.24 (H) 01/28/2023    CR 1.40 (H) 01/25/2022     Estimated Creatinine " Clearance: 45.2 mL/min (A) (based on SCr of 1.58 mg/dL (H)).

## 2023-09-12 NOTE — TELEPHONE ENCOUNTER
FYI - Status Update    Who is Calling: Sony FENG from [facility]     Update: Pt fell out of bed 2 X on Sunday and was admitted to the hospital on 9/11/2023      Does caller want a call/response back: No

## 2023-09-12 NOTE — TELEPHONE ENCOUNTER
Plan approved as written.     Tod Callahan MD  [facility] [place] hours ago (11:21 AM)     JN  Yes, I agree with that plan.  Thank you!    Tod Callahan

## 2023-09-14 ENCOUNTER — ANTICOAGULATION THERAPY VISIT (OUTPATIENT)
Dept: ANTICOAGULATION | Facility: CLINIC | Age: 83
End: 2023-09-14
Payer: COMMERCIAL

## 2023-09-14 DIAGNOSIS — Z79.01 LONG TERM CURRENT USE OF ANTICOAGULANT THERAPY: Primary | ICD-10-CM

## 2023-09-14 DIAGNOSIS — I48.20 CHRONIC ATRIAL FIBRILLATION (H): ICD-10-CM

## 2023-09-14 NOTE — TELEPHONE ENCOUNTER
Call to patient, spouse reports patient passed away 9/13/23.  Deb Reddy RN, BSN  Anticoagulation Clinic

## 2023-09-15 ENCOUNTER — TELEPHONE (OUTPATIENT)
Dept: PEDIATRICS | Facility: CLINIC | Age: 83
End: 2023-09-15
Payer: COMMERCIAL

## 2023-09-15 NOTE — TELEPHONE ENCOUNTER
Dionne from [facility] called to inform pcp that patient  on 23.  Dx acute renal failure in hospital and placed on palliative care.    Shila Brownlee RN

## 2023-09-18 ENCOUNTER — MEDICAL CORRESPONDENCE (OUTPATIENT)
Dept: HEALTH INFORMATION MANAGEMENT | Facility: CLINIC | Age: 83
End: 2023-09-18

## 2023-09-28 ENCOUNTER — MEDICAL CORRESPONDENCE (OUTPATIENT)
Dept: HEALTH INFORMATION MANAGEMENT | Facility: CLINIC | Age: 83
End: 2023-09-28

## 2024-04-26 NOTE — TELEPHONE ENCOUNTER
warfarin 5 mg tablets    Last Written Prescription Date: 9/29/17  Last Fill Qty: 72, # refills: 1  Last Office Visit with [facility] or [facility] prescribing provider: 1/23/18     Lab Results   Component Value Date    INR 2.5 03/19/2018    INR 2.0 03/05/2018    INR 2.2 09/05/2017    INR 2.2 08/14/2017     Refilled per nurse protocol standing orders.  Vianca Kent RN       Date of Service:  04/25/2024    SUBJECTIVE:  Mr. Jeong comes in today for evaluation of episode of dizziness.  He developed an episode of dizziness last week after working on the computer for about 3 hours.  He states that when he laid down, the room was spinning.  He has improved since this has happened, but still complains of some slight dizziness sensation.  He denies any radiation of pain to the extremities.  He denies any problems with his gait, balance, fine motor skills, or bowel and bladder control.  I have seen him previously for cervical stenosis.    PHYSICAL EXAMINATION:  He is walking with a normal heel-toe gait pattern.  He is able to perform a tandem heel-toe gait.  No focal motor or sensory deficits noted in the upper or lower extremities.  No hyperactive reflexes noted.  Stephanie's is negative.    IMAGING DATA:  His MRI of the cervical spine was reviewed with him, which shows multilevel degenerative changes and mild-to-moderate central stenosis as well as multilevel foraminal stenosis.    IMPRESSION AND PLAN:  Dizziness.  Mr. Jeong comes in after developing an episode of dizziness after working on the computer about a week ago.  He has done quite a bit of research and has gone on some paper in the Journal [employer] that stated that the dizziness can be related to cervical issues.  I did discuss that I have seen patients with dizziness related to cervical issues, but they generally have some other symptoms associated with neck pathology as well.  Given that his only complaint is dizziness, I would recommend looking at other sources of his dizziness including his inner ears.  He has no signs of cervical myelopathy or radiculopathy on exam.  Therefore, no intervention I would recommend for his neck at this point.  He will contact Dr. Arrieta to look at other sources of dizziness.        Dictated By:  Sue Leon MD  Signing Provider:  Sue Leon MD    PS/ARTEM  D:  04/26/2024 09:08:53  ROSARIO  T:  04/26/2024 03:06:45 PM  Job:  [id number]  CC:  Daniel Arrieta

## 2024-12-23 NOTE — PROGRESS NOTES
ANTICOAGULATION MANAGEMENT     Malcolm HENDERSON [facility] 81 year old male is on warfarin with therapeutic INR result. (Goal INR 2.0-3.0)    Recent labs: (last 7 days)     08/11/21  1711   INR 2.3*       ASSESSMENT     Source(s): Chart Review and Patient/Caregiver Call       Warfarin doses taken: Warfarin taken as instructed    Diet: No new diet changes identified    New illness, injury, or hospitalization: Yes: Patient reports he chest congestion last week, better now after antibiotic. Patient also reports he fell from a step ladder 7/24/21 while trimming branches, he received a couple stiches in his cheek    Medication/supplement changes: Patient reports he had an antibiotic last week could not remember name    Signs or symptoms of bleeding or clotting: No    Previous INR: Therapeutic last 2 visits    Additional findings: None     PLAN     Recommended plan for no diet, medication or health factor changes affecting INR     Dosing Instructions: Continue your current warfarin dose with next INR in 4 weeks       Summary  As of 8/11/2021    Full warfarin instructions:  2.5 mg every Tue, Sat; 5 mg all other days   Next INR check:  9/8/2021             Telephone call with Malcolm who verbalizes understanding and agrees to plan    Patient using outside facility for labs    Education provided: Please call back if any changes to your diet, medications or how you've been taking warfarin and Contact [phone]  with any changes, questions or concerns.     Plan made per [facility] anticoagulation protocol    Deb Reddy RN  Anticoagulation Clinic  8/12/2021    _______________________________________________________________________     Anticoagulation Episode Summary     Current INR goal:  2.0-3.0   TTR:  82.7 % (1 y)   Target end date:  Indefinite   Send INR reminders to:  JOHNNIE LACEY    Indications    Long term current use of anticoagulant therapy [Z79.01]  Chronic atrial fibrillation (H) [I48.20]           Comments:  5mg tabs //  APPT CARD // cell phone [phone]         Anticoagulation Care Providers     Provider Role Specialty Phone number    Tod Callahan MD Referring Internal Medicine - Pediatrics [phone]    Leisa Mistry MD Referring Internal Medicine - Pediatrics [phone]           [phone]